# Patient Record
Sex: FEMALE | Race: WHITE | NOT HISPANIC OR LATINO | Employment: UNEMPLOYED | URBAN - METROPOLITAN AREA
[De-identification: names, ages, dates, MRNs, and addresses within clinical notes are randomized per-mention and may not be internally consistent; named-entity substitution may affect disease eponyms.]

---

## 2017-03-20 ENCOUNTER — PATIENT MESSAGE (OUTPATIENT)
Dept: PODIATRY | Facility: CLINIC | Age: 47
End: 2017-03-20

## 2017-03-20 DIAGNOSIS — M10.9 ACUTE GOUT OF FOOT, UNSPECIFIED CAUSE, UNSPECIFIED LATERALITY: Primary | ICD-10-CM

## 2017-04-13 ENCOUNTER — OFFICE VISIT (OUTPATIENT)
Dept: OPTOMETRY | Facility: CLINIC | Age: 47
End: 2017-04-13
Payer: COMMERCIAL

## 2017-04-13 DIAGNOSIS — Z46.0 ENCOUNTER FOR FITTING OR ADJUSTMENT OF SPECTACLES OR CONTACT LENSES: Primary | ICD-10-CM

## 2017-04-13 DIAGNOSIS — H35.413 LATTICE DEGENERATION, BOTH EYES: Primary | ICD-10-CM

## 2017-04-13 DIAGNOSIS — H52.13 HIGH MYOPIA, BOTH EYES: ICD-10-CM

## 2017-04-13 DIAGNOSIS — H52.223 REGULAR ASTIGMATISM OF BOTH EYES: ICD-10-CM

## 2017-04-13 DIAGNOSIS — H52.4 PRESBYOPIA OU: ICD-10-CM

## 2017-04-13 DIAGNOSIS — Z13.5 SCREENING FOR OTHER EYE CONDITIONS: ICD-10-CM

## 2017-04-13 PROCEDURE — 92015 DETERMINE REFRACTIVE STATE: CPT | Mod: S$GLB,,, | Performed by: OPTOMETRIST

## 2017-04-13 PROCEDURE — 92310 CONTACT LENS FITTING OU: CPT | Mod: ,,, | Performed by: OPTOMETRIST

## 2017-04-13 PROCEDURE — 99999 PR PBB SHADOW E&M-EST. PATIENT-LVL III: CPT | Mod: PBBFAC,,, | Performed by: OPTOMETRIST

## 2017-04-13 PROCEDURE — 99499 UNLISTED E&M SERVICE: CPT | Mod: S$GLB,,, | Performed by: OPTOMETRIST

## 2017-04-13 PROCEDURE — 92014 COMPRE OPH EXAM EST PT 1/>: CPT | Mod: S$GLB,,, | Performed by: OPTOMETRIST

## 2017-04-13 NOTE — PATIENT INSTRUCTIONS
Bilateral dry eye.  Continue Restasis ophthalmic emulsion: Instill one drop two times per day, as directed (not with CLs in).  Use ocular lubricting drops, or CL rewetting drops with CLs in, as often as needed throughout the day.  High myopia with astigmatism in each eye.  New spectacle lens Rx issued for use in lieu of CLs.  Wears SCLs. Good lens fit in each eye with Biofinity Toric SCLs.  Shows need for power change in the left lens per over-refraction done today.  No need for power change in the right lens.  New contact lens Rx issued.  Suggest use of +1.50 or +1.75 OTC reading glasses over CLs as needed.  Follow up with Dr. Steward in retina clinic as scheduled.  Repeat general eye examination and refraction, and contact lens follow up, in one year.

## 2017-04-13 NOTE — PROGRESS NOTES
HPI     Contact Lens Follow Up    Additional comments: contact lens follow up with general eye examination.              Comments   Patient in today for contact lens follow-up with general eye examination.    Refer to additional patient encounter notes dated 04/13/2017. .         Last edited by Slim Paul, OD on 4/13/2017  4:03 PM. (History)            Assessment /Plan     For exam results, see Encounter Report.    1. Encounter for fitting or adjustment of spectacles or contact lenses                      Bilateral dry eye.  Continue Restasis ophthalmic emulsion: Instill one drop two times per day, as directed (not with CLs in).  Use ocular lubricting drops, or CL rewetting drops with CLs in, as often as needed throughout the day.  High myopia with astigmatism in each eye.  New spectacle lens Rx issued for use in lieu of CLs.  Wears SCLs. Good lens fit in each eye with Biofinity Toric SCLs.  Shows need for power change in the left lens per over-refraction done today.  No need for power change in the right lens.  New contact lens Rx issued.  Suggest use of +1.50 or +1.75 OTC reading glasses over CLs as needed.  Follow up with Dr. Steward in retina clinic as scheduled.  Repeat general eye examination and refraction, and contact lens follow up, in one year.

## 2017-04-13 NOTE — PROGRESS NOTES
"HPI     Concerns About Ocular Health    Additional comments: Eye exam and refraction, and contact lens follow-up.    Notes difficulty with near vision/reading.  Reports that she perceives   that "eyes are wiggling", and she has problems pulling words together.    Has used reading glasses over CLs which have not seemed to help the   problem.  No such symptoms at distance.            Comments   Patient's age: 47 y.o. 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 Paul)  Wears glasses? Yes, wears when without SCLs.     If yes, wears  Full-time or part-time?  As noted above  Present glasses are: Bifocal, SV Distance, SV Reading?  Single vision   distance lenses  Approximate age of present glasses:  18 months   Got new glasses following last refraction, or subsequently?:  yes   Any problem with VA with glasses?  No  Wears CLs?:  yes           If yes:              Type of CL worn:  New trial Biofinity Toric SCLs:                                                            OD  8.7   14.5     -7.50 -1.25 x 040                                                            OS  8.7   14.5      -9.00 -1.25 x 140              Wears full-time or part-time:  Full-time daily wear               Sleeps with contact lenses:  no               CL Solution used:  Biotrue, and Hchaya               How often replace CLs:  Monthly, typically              Any problem with VA with CLs?  no                Headaches?  No   Eye pain/discomfort?  Persistent dry eye symptoms                                                                                   Flashes?  No  Floaters?  "significant floaters" - "no new ones"  Diplopia/Double vision?  no  Patient's Ocular History:         Any eye surgeries? Laser procedures OS (for " "retina issues - lattice   degeneration)         Any eye injury?  no         Any treatment for eye disease?  As noted above  Family history of eye disease?  Father:  Cataract, and detached retina   following surgery - mother: ARMD (dry in one eye, and wet in the other).   Significant patient medical history:         1. Diabetes?  no       If yes, IDDM or NIDDM? n/a   2. HBP?  Yes.  Takes meds.  Well-controlled per patient              3. Other (describe):  none   ! OTC eyedrops currently using:  Blinx, Thera Tears, and Systane Ultra at   bedtime   ! Prescription eye meds currently using:  No - never filled Rx for   Restasis 2/2 cost concerns   ! Any history of allergy/adverse reaction to any eye meds used   previously?  no    ! Any history of allergy/adverse reaction to eyedrops used during prior   eye exam(s)? no   ! Any history of allergy/adverse reaction to Epinephrine or similar meds?   no    ! Patient okay with use of anesthetic eyedrops to check eye pressure?    yes        ! Patient okay with use of eyedrops to dilate pupils today?  yes   !  Allergies/Medications/Medical History/Family History reviewed today?    yes      PD =   68/64  Desired reading distance =  16.25"                                                                      Last edited by Slim Paul, OD on 4/13/2017  4:31 PM. (History)            Assessment /Plan     For exam results, see Encounter Report.    1. Lattice degeneration, both eyes     2. High myopia, both eyes     3. Regular astigmatism of both eyes     4. Presbyopia OU     5. Screening for other eye conditions                 Bilateral dry eye.  Continue Restasis ophthalmic emulsion: Instill one drop two times per day, as directed (not with CLs in).  Use ocular lubricting drops, or CL rewetting drops with CLs in, as often as needed throughout the day.  High myopia with astigmatism in each eye.  New spectacle lens Rx issued for use in lieu of CLs.  Wears SCLs. Good lens fit in " each eye with Biofinity Toric SCLs.  Shows need for power change in the left lens per over-refraction done today.  No need for power change in the right lens.  New contact lens Rx issued.  Suggest use of +1.50 or +1.75 OTC reading glasses over CLs as needed.  Follow up with Dr. Steward in retina clinic as scheduled.  Repeat general eye examination and refraction, and contact lens follow up, in one year.

## 2017-04-13 NOTE — MR AVS SNAPSHOT
[facility] - Optometry  [address]  [facility] [address]  Phone: [phone]  Fax: [phone]                  Lashae Colvin   2017 3:30 PM   Office Visit    Description:  Female : 1970   Provider:  Slim Paul OD   Department:  [facility]           Reason for Visit     Concerns About Ocular Health     Contact Lens Follow Up                To Do List           Goals (5 Years of Data)     None      [facility]     [facility] -  Assistance  Unless otherwise directed by your provider, please contact [facility], our nurse care line that is available for  assistance.     Registered nurses in the [facility] provide: appointment scheduling, clinical advisement, health education, and other advisory services.  Call: [phone] (toll free)               Medications           Message regarding Medications     Verify the changes and/or additions to your medication regime listed below are the same as discussed with your clinician today.  If any of these changes or additions are incorrect, please notify your healthcare provider.             Verify that the below list of medications is an accurate representation of the medications you are currently taking.  If none reported, the list may be blank. If incorrect, please contact your healthcare provider. Carry this list with you in case of emergency.           Current Medications     albuterol 90 mcg/actuation inhaler Inhale 2 puffs into the lungs every 6 (six) hours as needed for Wheezing.    CALCIUM-VITAMIN D3 ORAL Take 1 tablet by mouth once daily.    fluticasone (FLONASE) 50 mcg/actuation nasal spray     ibuprofen (ADVIL,MOTRIN) 800 MG tablet Take 800 mg by mouth 3 (three) times daily.    lisinopril 10 MG tablet Take 1 tablet (10 mg total) by mouth once daily.    RESTASIS 0.05 % ophthalmic emulsion     omega-3 fatty acids-vitamin E (FISH OIL) 1,000 mg Cap Take 2,000 mg by  mouth 2 (two) times daily.           Clinical Reference Information           Allergies as of 4/13/2017     No Known Allergies      Immunizations Administered on Date of Encounter - 4/13/2017     None      Instructions    Bilateral dry eye.  Continue Restasis ophthalmic emulsion: Instill one drop two times per day, as directed (not with CLs in).  Use ocular lubricting drops, or CL rewetting drops with CLs in, as often as needed throughout the day.  High myopia with astigmatism in each eye.  New spectacle lens Rx issued for use in lieu of CLs.  Wears SCLs. Good lens fit in each eye with Biofinity Toric SCLs.  Shows need for power change in the left lens per over-refraction done today.  No need for power change in the right lens.  New contact lens Rx issued.  Suggest use of +1.50 or +1.75 OTC reading glasses over CLs as needed.  Follow up with Dr. Steward in retina clinic as scheduled.  Repeat general eye examination and refraction, and contact lens follow up, in one year.           Language Assistance Services     ATTENTION: Language assistance services are available, free of charge. Please call [phone].      ATENCIÓN: Si whitney vinson, tiene a park disposición servicios gratuitos de asistencia lingüística. Llame al [phone].     REN Ý: N?u b?n nói Ti?ng Vi?t, có các d?ch v? h? tr? ngôn ng? mi?n phí dành cho b?n. G?i s? [phone].         [facility] complies with applicable Federal civil rights laws and does not discriminate on the basis of race, color, national origin, age, disability, or sex.

## 2017-04-13 NOTE — MR AVS SNAPSHOT
[facility] - Optometry  [address]  [facility] [address]  Phone: [phone]  Fax: [phone]                  Lashae Colvin   2017 4:00 PM   Office Visit    Description:  Female : 1970   Provider:  Slim Paul OD   Department:  [facility]           Reason for Visit     Contact Lens Follow Up                To Do List           Goals (5 Years of Data)     None      [facility]     [facility] On [facility] Nurse Care Line -  Assistance  Unless otherwise directed by your provider, please contact [facility], our nurse care line that is available for  assistance.     Registered nurses in the [facility] provide: appointment scheduling, clinical advisement, health education, and other advisory services.  Call: [phone] (toll free)               Medications           Message regarding Medications     Verify the changes and/or additions to your medication regime listed below are the same as discussed with your clinician today.  If any of these changes or additions are incorrect, please notify your healthcare provider.             Verify that the below list of medications is an accurate representation of the medications you are currently taking.  If none reported, the list may be blank. If incorrect, please contact your healthcare provider. Carry this list with you in case of emergency.           Current Medications     albuterol 90 mcg/actuation inhaler Inhale 2 puffs into the lungs every 6 (six) hours as needed for Wheezing.    CALCIUM-VITAMIN D3 ORAL Take 1 tablet by mouth once daily.    fluticasone (FLONASE) 50 mcg/actuation nasal spray     ibuprofen (ADVIL,MOTRIN) 800 MG tablet Take 800 mg by mouth 3 (three) times daily.    lisinopril 10 MG tablet Take 1 tablet (10 mg total) by mouth once daily.    RESTASIS 0.05 % ophthalmic emulsion     omega-3 fatty acids-vitamin E (FISH OIL) 1,000 mg Cap Take 2,000 mg by mouth 2 (two) times daily.            Clinical Reference Information           Allergies as of 4/13/2017     No Known Allergies      Immunizations Administered on Date of Encounter - 4/13/2017     None      Instructions    Bilateral dry eye.  Continue Restasis ophthalmic emulsion: Instill one drop two times per day, as directed (not with CLs in).  Use ocular lubricting drops, or CL rewetting drops with CLs in, as often as needed throughout the day.  High myopia with astigmatism in each eye.  New spectacle lens Rx issued for use in lieu of CLs.  Wears SCLs. Good lens fit in each eye with Biofinity Toric SCLs.  Shows need for power change in the left lens per over-refraction done today.  No need for power change in the right lens.  New contact lens Rx issued.  Suggest use of +1.50 or +1.75 OTC reading glasses over CLs as needed.  Follow up with Dr. Steward in retina clinic as scheduled.  Repeat general eye examination and refraction, and contact lens follow up, in one year.             Language Assistance Services     ATTENTION: Language assistance services are available, free of charge. Please call [phone].      ATENCIÓN: Si whitney vinson, tiene a park disposición servicios gratuitos de asistencia lingüística. Llame al [phone].     REN Ý: N?u b?n nói Ti?ng Vi?t, có các d?ch v? h? tr? ngôn ng? mi?n phí dành cho b?n. G?i s? [phone].         [facility] complies with applicable Federal civil rights laws and does not discriminate on the basis of race, color, national origin, age, disability, or sex.

## 2017-07-14 ENCOUNTER — OFFICE VISIT (OUTPATIENT)
Dept: INTERNAL MEDICINE | Facility: CLINIC | Age: 47
End: 2017-07-14
Attending: INTERNAL MEDICINE
Payer: COMMERCIAL

## 2017-07-14 VITALS
SYSTOLIC BLOOD PRESSURE: 110 MMHG | HEIGHT: 66 IN | BODY MASS INDEX: 26.82 KG/M2 | OXYGEN SATURATION: 99 % | DIASTOLIC BLOOD PRESSURE: 78 MMHG | WEIGHT: 166.88 LBS | HEART RATE: 71 BPM

## 2017-07-14 DIAGNOSIS — G47.9 SLEEP DISORDER: ICD-10-CM

## 2017-07-14 DIAGNOSIS — M10.9 GOUT, UNSPECIFIED CAUSE, UNSPECIFIED CHRONICITY, UNSPECIFIED SITE: ICD-10-CM

## 2017-07-14 DIAGNOSIS — I10 HYPERTENSION, BENIGN: ICD-10-CM

## 2017-07-14 DIAGNOSIS — Z23 NEED FOR HEPATITIS A AND B VACCINATION: ICD-10-CM

## 2017-07-14 DIAGNOSIS — I10 ESSENTIAL HYPERTENSION: ICD-10-CM

## 2017-07-14 DIAGNOSIS — Z00.00 ANNUAL PHYSICAL EXAM: Primary | ICD-10-CM

## 2017-07-14 DIAGNOSIS — Z12.39 SCREENING FOR BREAST CANCER: ICD-10-CM

## 2017-07-14 PROCEDURE — 99396 PREV VISIT EST AGE 40-64: CPT | Mod: S$GLB,,, | Performed by: INTERNAL MEDICINE

## 2017-07-14 PROCEDURE — 99999 PR PBB SHADOW E&M-EST. PATIENT-LVL III: CPT | Mod: PBBFAC,,, | Performed by: INTERNAL MEDICINE

## 2017-07-14 RX ORDER — PREDNISONE 10 MG/1
TABLET ORAL
Qty: 11 TABLET | Refills: 0 | Status: SHIPPED | OUTPATIENT
Start: 2017-07-14 | End: 2018-08-09

## 2017-07-14 RX ORDER — COLCHICINE 0.6 MG/1
TABLET ORAL
COMMUNITY
Start: 2017-06-28 | End: 2017-07-14 | Stop reason: ALTCHOICE

## 2017-07-14 RX ORDER — INDOMETHACIN 50 MG/1
50 CAPSULE ORAL
Qty: 14 CAPSULE | Refills: 1 | Status: SHIPPED | OUTPATIENT
Start: 2017-07-14 | End: 2017-07-21

## 2017-07-14 RX ORDER — PRAZOSIN HYDROCHLORIDE 2 MG/1
CAPSULE ORAL
Refills: 0 | COMMUNITY
Start: 2017-07-12 | End: 2017-11-27 | Stop reason: SDUPTHER

## 2017-07-14 RX ORDER — INDOMETHACIN 50 MG/1
CAPSULE ORAL
COMMUNITY
Start: 2017-06-28 | End: 2017-07-14 | Stop reason: SDUPTHER

## 2017-07-14 RX ORDER — LISINOPRIL 10 MG/1
10 TABLET ORAL DAILY
Qty: 90 TABLET | Refills: 3 | Status: SHIPPED | OUTPATIENT
Start: 2017-07-14 | End: 2018-08-03 | Stop reason: SDUPTHER

## 2017-07-14 RX ORDER — ALLOPURINOL 100 MG/1
100 TABLET ORAL DAILY
Qty: 90 TABLET | Refills: 0 | Status: SHIPPED | OUTPATIENT
Start: 2017-07-14 | End: 2018-02-22 | Stop reason: DRUGHIGH

## 2017-07-14 NOTE — PROGRESS NOTES
Subjective:       Patient ID: Lashae Colvin is a 47 y.o. female.    Chief Complaint: Annual Exam and Foot Pain    HPI     her for annual exam  Had recent cmp 6/29/17 CMP nonfasting - glu 112  Cr 0.6, Na 131, CO2 26, tbili 0.8, Ast: 19, ALT 22, TP 6.8, Alb 3.8 K 3.8, ALP 53, BUN 10  Uric acid elevated at 10.3     Was treated a couple of weeks ago for gout of left foot at first MTP joint- given colchicine 1.2 x 1 and 0.6 x 1 and then started indomethacin the next day. Colchicine did not help and caused loose stools  Took indomethacin and last dose 6 days ago and symptoms improved. Then noticed right foot began to start gout flare with erythema, inc warmth and tenderness at right first MTP joint.   No fevers or chills.   Pt has been following dietary restrictions to prevent gout flares. Avoiding all etoh and avoiding meat to help with gout   Taking zantac otc when taking nsaids for gout flares.     Has rheum appt at outside facility scheduled - she is ready to start preventative medicine since unable to prevent flares with dietary restrictions - Has never tried allopurinol     Followed by sleep for sleep disorder and jenn prazosin     Review of Systems   Constitutional: Negative for chills and fever.   Respiratory: Negative for cough and shortness of breath.    Cardiovascular: Negative for chest pain and palpitations.   Musculoskeletal: Positive for arthralgias and joint swelling.       Objective:      Physical Exam   Constitutional: She is oriented to person, place, and time. She appears well-developed and well-nourished.   HENT:   Head: Normocephalic and atraumatic.   Right Ear: External ear normal.   Left Ear: External ear normal.   Nose: Nose normal.   Mouth/Throat: Oropharynx is clear and moist. No oropharyngeal exudate.   No carotid bruits   Eyes: Conjunctivae and EOM are normal.   Neck: Neck supple. No thyromegaly present.   Cardiovascular: Normal rate, regular rhythm and intact distal pulses.     Pulmonary/Chest: Effort normal and breath sounds normal.   Abdominal: Soft. Bowel sounds are normal.   Musculoskeletal: She exhibits edema and tenderness.   right first MTP with redness and ttp and mild edema  Left first MTP with mild ttp and edema  No calf ttp, edema, inc warmth or redness   Lymphadenopathy:     She has no cervical adenopathy.   Neurological: She is alert and oriented to person, place, and time. Coordination normal.   Skin: Skin is warm and dry.   Psychiatric: She has a normal mood and affect. Her behavior is normal. Judgment and thought content normal.       Assessment:       Lashae was seen today for annual exam and foot pain.    Diagnoses and all orders for this visit:    Annual physical exam  -     Comprehensive metabolic panel; Future  -     CBC auto differential; Future  -     TSH; Future  -     Lipid panel; Future  Recommend daily sunscreen, cardiovascular exercise min 30 min 5 days per week. Seatbelts routinely.    Gout, unspecified cause, unspecified chronicity, unspecified site: give pdn taper, pt to resume zantac otc with indomethacin x 7 days. Start allopurinol in 1-2 weeks at 100mg - repeat level in 4-6 weeks - cont dietary restrictions  -     Uric acid; Future    Essential hypertension: controlled, cont meds    Sleep disorder: stable, followed by sleep - cont med    Hypertension, benign:as above  -     lisinopril 10 MG tablet; Take 1 tablet (10 mg total) by mouth once daily.    Screening for breast cancer  -     Mammo Digital Screening Bilateral With CAD; Future    Need for hepatitis A and B vaccination:   -     (In Office Administered) Hepatitis A / Hepatitis B Combined Vaccine (IM); Future    Other orders  -     allopurinol (ZYLOPRIM) 100 MG tablet; Take 1 tablet (100 mg total) by mouth once daily.  -     predniSONE (DELTASONE) 10 MG tablet; Take 4 pills daily x 1 day, then 3 pills daily x1 day, then 2 pill daily x 1 days, then 1 pill daily x 1 day then 1/2 pill daily for 2  days.  -     indomethacin (INDOCIN) 50 MG capsule; Take 1 capsule (50 mg total) by mouth 3 (three) times daily with meals.  -     ranitidine (ZANTAC) 75 MG tablet; Take 1 tablet (75 mg total) by mouth 2 (two) times daily.

## 2017-08-14 ENCOUNTER — PATIENT MESSAGE (OUTPATIENT)
Dept: INTERNAL MEDICINE | Facility: CLINIC | Age: 47
End: 2017-08-14

## 2017-11-28 RX ORDER — PRAZOSIN HYDROCHLORIDE 2 MG/1
CAPSULE ORAL
Qty: 60 CAPSULE | Refills: 11 | Status: SHIPPED | OUTPATIENT
Start: 2017-11-28 | End: 2018-12-31 | Stop reason: SDUPTHER

## 2018-02-22 ENCOUNTER — PATIENT OUTREACH (OUTPATIENT)
Dept: INTERNAL MEDICINE | Facility: CLINIC | Age: 48
End: 2018-02-22

## 2018-02-22 NOTE — PROGRESS NOTES
[facility] is committed to your overall health.  To help you get the most out of each of your visits, we will review your information to make sure you are up to date on all of your recommended tests and/or procedures.       Your PCP  Kristen Abreu MD   found that you may be due for:       Health Maintenance Due   Topic Date Due    Influenza Vaccine  08/01/2017             If you have had any of the above done at another facility, please bring the records or information with you so that your record at [facility] will be complete.  If you would like to schedule any of these, please contact me.     If you are currently taking medication, please bring it with you to your appointment for review.     Also, if you have any type of Advanced Directives, please bring them with you to your office visit so we may scan them into your chart.       Thank you for Choosing [facility] for your healthcare needs.        Additional Information  If you have questions, you can email [email or web address] or call [phone]  to talk to our [facility] staff. Remember, [facility] is NOT to be used for urgent needs. For medical emergencies, dial 911.

## 2018-02-23 ENCOUNTER — OFFICE VISIT (OUTPATIENT)
Dept: INTERNAL MEDICINE | Facility: CLINIC | Age: 48
End: 2018-02-23
Attending: INTERNAL MEDICINE
Payer: COMMERCIAL

## 2018-02-23 VITALS
HEIGHT: 66 IN | DIASTOLIC BLOOD PRESSURE: 82 MMHG | WEIGHT: 168.63 LBS | BODY MASS INDEX: 27.1 KG/M2 | HEART RATE: 76 BPM | SYSTOLIC BLOOD PRESSURE: 130 MMHG | OXYGEN SATURATION: 100 %

## 2018-02-23 DIAGNOSIS — M25.552 BILATERAL HIP PAIN: Primary | ICD-10-CM

## 2018-02-23 DIAGNOSIS — R22.42 MASS OF LEFT THIGH: ICD-10-CM

## 2018-02-23 DIAGNOSIS — R22.42 HIP MASS, LEFT: ICD-10-CM

## 2018-02-23 DIAGNOSIS — M25.551 BILATERAL HIP PAIN: Primary | ICD-10-CM

## 2018-02-23 PROCEDURE — 99999 PR PBB SHADOW E&M-EST. PATIENT-LVL III: CPT | Mod: PBBFAC,,, | Performed by: INTERNAL MEDICINE

## 2018-02-23 PROCEDURE — 99214 OFFICE O/P EST MOD 30 MIN: CPT | Mod: S$GLB,,, | Performed by: INTERNAL MEDICINE

## 2018-02-23 PROCEDURE — 3008F BODY MASS INDEX DOCD: CPT | Mod: S$GLB,,, | Performed by: INTERNAL MEDICINE

## 2018-02-23 RX ORDER — ALLOPURINOL 300 MG/1
300 TABLET ORAL DAILY
COMMUNITY
Start: 2017-08-01 | End: 2018-05-01 | Stop reason: SDUPTHER

## 2018-02-23 RX ORDER — ALPRAZOLAM 0.5 MG/1
TABLET ORAL
Qty: 2 TABLET | Refills: 0 | Status: SHIPPED | OUTPATIENT
Start: 2018-02-23 | End: 2018-08-09

## 2018-02-23 NOTE — PROGRESS NOTES
Subjective:   Patient ID: Lashae Colvin is a 47 y.o. female  Chief complaint:   Chief Complaint   Patient presents with    Hip Pain     swelling  and pain radiating side and back of legs       HPI  Pt here for 8-9 months of bilateral hip pian left > right   Joint Pain - stabbing in nature, will radiate down lateral aspect and post portion of thighs.   Constant but intensity will flare   Worse with bending, getting out of car, lying on side  Improves with nothing. Ibuprofen not helpful  No trauma or inciting event  Also noticed at this time a mass at left lateral hip/thigh - tender to touch or with lying on side, no skin changes or redness. No inc warmth    Has chronic mild intermittent lower back pain unchanged in nature. Achy located at bilateral paraspinal mm. Assoc with + sciatica along post legs and lateral legs described as burning.   Has been Exercising and stretching which are helpful at times.   Walking more since starting allopurinol and gout controlled    No numbness or tingling, weakness, saddle paraesthesias, gait disturbance, incontinence  No dysuria or vaginal discharge, no abd pain, No fevers or chills   No unexplained wt loss  No unexplained bleeding or bruising.     Review of Systems   Constitutional: Negative for activity change and unexpected weight change.   HENT: Negative for hearing loss, rhinorrhea and trouble swallowing.    Eyes: Negative for discharge and visual disturbance.   Respiratory: Negative for chest tightness and wheezing.    Cardiovascular: Negative for chest pain and palpitations.   Gastrointestinal: Negative for blood in stool, constipation, diarrhea and vomiting.   Endocrine: Negative for polydipsia and polyuria.   Genitourinary: Negative for difficulty urinating, dysuria, hematuria and menstrual problem.   Musculoskeletal: Positive for arthralgias, joint swelling and neck pain.   Skin: Negative for color change and rash.   Neurological: Negative for weakness and  "headaches.   Psychiatric/Behavioral: Negative for confusion and dysphoric mood.     Objective:  Vitals:    02/23/18 1232   BP: 130/82   BP Location: Left arm   Patient Position: Sitting   BP Method: Medium (Manual)   Pulse: 76   SpO2: 100%   Weight: 76.5 kg (168 lb 10.4 oz)   Height: 5' 6" (1.676 m)     Body mass index is 27.22 kg/m².    Physical Exam   Constitutional: She is oriented to person, place, and time. She appears well-developed and well-nourished.   HENT:   Head: Normocephalic and atraumatic.   Eyes: Conjunctivae and EOM are normal.   Neck: Normal range of motion. Neck supple.   Cardiovascular: Normal rate, regular rhythm and intact distal pulses.    Pulmonary/Chest: Effort normal and breath sounds normal.   Abdominal: Soft. Normal appearance and bowel sounds are normal.   Musculoskeletal: Normal range of motion. She exhibits tenderness. She exhibits no edema.   6"x6" soft tissue mass at left lateral thigh at greater trochanter that is tender to palpation  No skin changes, inc warmth, laceration, ulcers or skin breakdown  FROM of internal and external rotation of hip  5/5 strength of B LE  No spinal ttp  + ttp of lumbar paraspinal mm  Sensation in tact bilaterally   Neurological: She is alert and oriented to person, place, and time. She has normal strength. Gait normal.   Skin: Skin is warm, dry and intact. Capillary refill takes less than 2 seconds. No cyanosis. Nails show no clubbing.   Psychiatric: She has a normal mood and affect. Her speech is normal and behavior is normal. Cognition and memory are normal.   Vitals reviewed.    Assessment:  1. Bilateral hip pain    2. Mass of left thigh    3. Hip mass, left        Plan:  Lashae was seen today for hip pain.    Diagnoses and all orders for this visit:    Bilateral hip pain  -     MRI Pelvis W WO Contrast; Future    Mass of left thigh  -     MRI Pelvis W WO Contrast; Future    Hip mass, left  -     MRI Pelvis W WO Contrast; Future    Other orders  -   "   ALPRAZolam (XANAX) 0.5 MG tablet; Take 1 tab 30 min prior to MRI - ok to take 2nd dose if 1st dose not effective after 30 minutes    Sx and mass x 8-9 months - check MRI and will give further recs pending those results.   For now can try aleve otc bid x 7-14 days scheduled with otc zantac   Xanax given prior to MRI for hx of claustrophobia   Er and rtc prompts given.   All questions were answered and pt verbalized understanding of plan.     Health Maintenance   Topic Date Due    Influenza Vaccine  08/01/2017    Mammogram  07/06/2018 (Originally 7/15/2016)    Pap Smear with HPV Cotest  08/01/2019    TETANUS VACCINE  01/01/2020    Lipid Panel  01/20/2021

## 2018-03-01 ENCOUNTER — HOSPITAL ENCOUNTER (OUTPATIENT)
Dept: RADIOLOGY | Facility: OTHER | Age: 48
Discharge: HOME | End: 2018-03-01
Attending: INTERNAL MEDICINE
Payer: COMMERCIAL

## 2018-03-01 DIAGNOSIS — R22.42 MASS OF LEFT THIGH: ICD-10-CM

## 2018-03-01 DIAGNOSIS — R22.42 HIP MASS, LEFT: ICD-10-CM

## 2018-03-01 DIAGNOSIS — M25.552 BILATERAL HIP PAIN: ICD-10-CM

## 2018-03-01 DIAGNOSIS — M25.551 BILATERAL HIP PAIN: ICD-10-CM

## 2018-03-01 PROCEDURE — A9585 GADOBUTROL INJECTION: HCPCS | Performed by: INTERNAL MEDICINE

## 2018-03-01 PROCEDURE — 72197 MRI PELVIS W/O & W/DYE: CPT | Mod: 26,,, | Performed by: RADIOLOGY

## 2018-03-01 PROCEDURE — 25500020 PHARM REV CODE 255: Performed by: INTERNAL MEDICINE

## 2018-03-01 PROCEDURE — 72197 MRI PELVIS W/O & W/DYE: CPT | Mod: TC

## 2018-03-01 RX ORDER — GADOBUTROL 604.72 MG/ML
7.5 INJECTION INTRAVENOUS
Status: COMPLETED | OUTPATIENT
Start: 2018-03-01 | End: 2018-03-01

## 2018-03-01 RX ADMIN — GADOBUTROL 7.5 ML: 604.72 INJECTION INTRAVENOUS at 04:03

## 2018-03-05 ENCOUNTER — TELEPHONE (OUTPATIENT)
Dept: INTERNAL MEDICINE | Facility: CLINIC | Age: 48
End: 2018-03-05

## 2018-03-05 DIAGNOSIS — M25.551 PAIN OF BOTH HIP JOINTS: ICD-10-CM

## 2018-03-05 DIAGNOSIS — M76.891 ENTHESOPATHY OF HIP REGION ON BOTH SIDES: Primary | ICD-10-CM

## 2018-03-05 DIAGNOSIS — M76.892 ENTHESOPATHY OF HIP REGION ON BOTH SIDES: Primary | ICD-10-CM

## 2018-03-05 DIAGNOSIS — M25.552 PAIN OF BOTH HIP JOINTS: ICD-10-CM

## 2018-03-05 RX ORDER — NAPROXEN 500 MG/1
500 TABLET ORAL 2 TIMES DAILY WITH MEALS
Qty: 28 TABLET | Refills: 0 | Status: SHIPPED | OUTPATIENT
Start: 2018-03-05 | End: 2018-06-06 | Stop reason: SDUPTHER

## 2018-03-05 NOTE — TELEPHONE ENCOUNTER
Message sent to pt via my chart with lab results and updates to plan.     Please schedule ortho appt  Please inform her that PT should be contacting her to schedule with them

## 2018-03-06 ENCOUNTER — TELEPHONE (OUTPATIENT)
Dept: INTERNAL MEDICINE | Facility: CLINIC | Age: 48
End: 2018-03-06

## 2018-03-06 NOTE — TELEPHONE ENCOUNTER
----- Message from Stella Esquivel sent at 3/6/2018  3:21 PM CST -----  Contact: pt  _  1st Request  _  2nd Request  _  3rd Request        Who: pt    Why: Requesting a call back in regards to returned the nurse's phone call. Please call pt  Please return the call at earliest convenience. Thanks!    What Number to Call Back:[phone]      When to Expect a call back: (Within 24 hours)

## 2018-03-29 DIAGNOSIS — M25.552 PAIN OF BOTH HIP JOINTS: Primary | ICD-10-CM

## 2018-03-29 DIAGNOSIS — M25.551 PAIN OF BOTH HIP JOINTS: Primary | ICD-10-CM

## 2018-04-02 ENCOUNTER — HOSPITAL ENCOUNTER (OUTPATIENT)
Dept: RADIOLOGY | Facility: HOSPITAL | Age: 48
Discharge: HOME | End: 2018-04-02
Attending: ORTHOPAEDIC SURGERY
Payer: COMMERCIAL

## 2018-04-02 ENCOUNTER — OFFICE VISIT (OUTPATIENT)
Dept: ORTHOPEDICS | Facility: CLINIC | Age: 48
End: 2018-04-02
Payer: COMMERCIAL

## 2018-04-02 VITALS — WEIGHT: 168.88 LBS | BODY MASS INDEX: 27.14 KG/M2 | HEIGHT: 66 IN

## 2018-04-02 DIAGNOSIS — M25.551 PAIN OF BOTH HIP JOINTS: ICD-10-CM

## 2018-04-02 DIAGNOSIS — M70.62 TROCHANTERIC BURSITIS, LEFT HIP: Primary | ICD-10-CM

## 2018-04-02 DIAGNOSIS — M25.552 PAIN OF BOTH HIP JOINTS: ICD-10-CM

## 2018-04-02 DIAGNOSIS — M70.61 TROCHANTERIC BURSITIS, RIGHT HIP: ICD-10-CM

## 2018-04-02 PROCEDURE — 73521 X-RAY EXAM HIPS BI 2 VIEWS: CPT | Mod: TC

## 2018-04-02 PROCEDURE — 73521 X-RAY EXAM HIPS BI 2 VIEWS: CPT | Mod: 26,,, | Performed by: RADIOLOGY

## 2018-04-02 PROCEDURE — 20610 DRAIN/INJ JOINT/BURSA W/O US: CPT | Mod: LT,S$GLB,, | Performed by: ORTHOPAEDIC SURGERY

## 2018-04-02 PROCEDURE — 99999 PR PBB SHADOW E&M-EST. PATIENT-LVL II: CPT | Mod: PBBFAC,,, | Performed by: ORTHOPAEDIC SURGERY

## 2018-04-02 PROCEDURE — 99203 OFFICE O/P NEW LOW 30 MIN: CPT | Mod: 25,S$GLB,, | Performed by: ORTHOPAEDIC SURGERY

## 2018-04-02 RX ORDER — TRIAMCINOLONE ACETONIDE 40 MG/ML
40 INJECTION, SUSPENSION INTRA-ARTICULAR; INTRAMUSCULAR
Status: COMPLETED | OUTPATIENT
Start: 2018-04-02 | End: 2018-04-02

## 2018-04-02 RX ADMIN — TRIAMCINOLONE ACETONIDE 40 MG: 40 INJECTION, SUSPENSION INTRA-ARTICULAR; INTRAMUSCULAR at 02:04

## 2018-04-02 NOTE — PROGRESS NOTES
Subjective:      Patient ID: Lashae Colvin is a 48 y.o. female.    Chief Complaint: Pain of the Left Hip and Pain of the Right Hip    HPI Lashae Colvin is a 48 year old female here with a 8 month history of bilateral hip pain (L > R). The patient is a . There was not a history of trauma.  The pain is moderate. The pain is located in the lateral.  There is is not radiation.  The pain is described as dull. The patient has not had prior surgery. It is aggravated by walking and with moderate activity.  It is alleviated by rest. There is occasional numbness or tingling of the lower extremity.  There is back pain.  She  has tried medications (Naproxen) but not injections. They have helped.  She does have difficulty getting in or out of a car, getting dressed, or going up or down stairs.  The patient does not use an assistive device.    Past Medical History:   Diagnosis Date    Arthritis     cervical spine DJD    Cancer     CIS of cervis s/p conization    Headache(784.0)     Hyperlipidemia     Hypertension     PTSD (post-traumatic stress disorder)        Past Surgical History:   Procedure Laterality Date    conization      for cervical CA in 2002    HAND SURGERY      R hand, repair of a severed tendon    TONSILLECTOMY         Family History   Problem Relation Age of Onset    Cancer Mother      spinal cord tumor    Hypertension Mother     Arthritis Mother     Diabetes Brother     HIV Brother     Macular degeneration Other     Hypertension Father     Cataracts Father     Diabetes Maternal Grandfather     Heart disease Paternal Grandfather     Heart attack Paternal Grandfather     Gout Paternal Grandfather     Amblyopia Neg Hx     Blindness Neg Hx     Glaucoma Neg Hx     Retinal detachment Neg Hx     Strabismus Neg Hx     Stroke Neg Hx     Thyroid disease Neg Hx     Ovarian cancer Neg Hx        Social History     Social History    Marital status: Significant Other      Spouse name: N/A    Number of children: N/A    Years of education: N/A     Occupational History     [employer]     Social History Main Topics    Smoking status: Former Smoker     Quit date: 4/11/2001    Smokeless tobacco: Never Used    Alcohol use 3.0 oz/week     5 Glasses of wine per week    Drug use: No    Sexual activity: Yes     Partners: Male     Birth control/ protection: Sponge, Spermicide     Other Topics Concern    None     Social History Narrative    Lives w/boyfriend    Originally from [place]    Moved to [facility] in 1998       Current Outpatient Prescriptions   Medication Sig Dispense Refill    allopurinol (ZYLOPRIM) 300 MG tablet Take 300 mg by mouth once daily.      CALCIUM-VITAMIN D3 ORAL Take 1 tablet by mouth once daily.      fluticasone (FLONASE) 50 mcg/actuation nasal spray   0    lisinopril 10 MG tablet Take 1 tablet (10 mg total) by mouth once daily. 90 tablet 3    prazosin (MINIPRESS) 2 MG Cap take 2 capsules by mouth every evening 60 capsule 11    albuterol 90 mcg/actuation inhaler Inhale 2 puffs into the lungs every 6 (six) hours as needed for Wheezing. 18 g 11    ALPRAZolam (XANAX) 0.5 MG tablet Take 1 tab 30 min prior to MRI - ok to take 2nd dose if 1st dose not effective after 30 minutes 2 tablet 0    naproxen (NAPROSYN) 500 MG tablet Take 1 tablet (500 mg total) by mouth 2 (two) times daily with meals. 28 tablet 0    omega-3 fatty acids-vitamin E (FISH OIL) 1,000 mg Cap Take 2,000 mg by mouth 2 (two) times daily.  0    predniSONE (DELTASONE) 10 MG tablet Take 4 pills daily x 1 day, then 3 pills daily x1 day, then 2 pill daily x 1 days, then 1 pill daily x 1 day then 1/2 pill daily for 2 days. 11 tablet 0    ranitidine (ZANTAC) 75 MG tablet Take 1 tablet (75 mg total) by mouth 2 (two) times daily. 60 tablet 0    RESTASIS 0.05 % ophthalmic emulsion        No current facility-administered medications for this visit.        Review of patient's  "allergies indicates:  No Known Allergies      Review of Systems   Constitution: Negative for chills, fever and night sweats.   HENT: Negative for hearing loss.    Eyes: Negative for blurred vision and double vision.   Cardiovascular: Negative for chest pain, claudication and leg swelling.   Respiratory: Negative for shortness of breath.    Endocrine: Negative for polydipsia, polyphagia and polyuria.   Hematologic/Lymphatic: Negative for adenopathy and bleeding problem. Does not bruise/bleed easily.   Skin: Negative for poor wound healing and rash.   Musculoskeletal: Positive for joint pain. Negative for joint swelling.   Gastrointestinal: Negative for abdominal pain, diarrhea, heartburn, nausea and vomiting.   Genitourinary: Negative for bladder incontinence.   Neurological: Negative for focal weakness, headaches, numbness, paresthesias and sensory change.   Psychiatric/Behavioral: The patient is not nervous/anxious.    Allergic/Immunologic: Negative for persistent infections.   All other systems reviewed and are negative.        Objective:      Estimated body mass index is 27.26 kg/m² as calculated from the following:    Height as of this encounter: 5' 6" (1.676 m).    Weight as of this encounter: 76.6 kg (168 lb 14 oz).        General    Constitutional: She is oriented to person, place, and time. She appears well-developed and well-nourished. No distress.   HENT:   Head: Normocephalic and atraumatic.   Eyes: EOM are normal.   Cardiovascular: Normal rate and intact distal pulses.    Pulmonary/Chest: Effort normal.   Neurological: She is alert and oriented to person, place, and time.   Psychiatric: She has a normal mood and affect. Her behavior is normal.     General Musculoskeletal Exam   Gait: normal   Pelvic Obliquity: none      Right Knee Exam     Inspection   Alignment:  normal  Effusion: effusion    Left Knee Exam     Inspection   Alignment:  normal  Effusion: absent    Right Hip Exam     Inspection   Scars: " absent  Swelling: absent  Bruising: absent  No deformity of hip.  Quadriceps Atrophy:  Negative  Erythema: absent    Tenderness   The patient tender to palpation of the trochanteric bursa.    Range of Motion   Extension: 0   Flexion: 120   Internal Rotation: 25   External Rotation: 50   Abduction: 35   Adduction: 25     Muscle Strength   The patient has normal right hip strength.    Tests   Pain w/ forced internal rotation (ROOPA): absent  Pain w/ forced external rotation (FADIR): absent  Stinchfield test: negative  Log Roll: negative    Other   Sensation: normal  Left Hip Exam     Inspection   Scars: absent  Swelling: absent  No deformity of hip.  Quadriceps Atrophy:  negative  Erythema: absent  Bruising: absent    Tenderness   The patient tender to palpation of the trochanteric bursa.    Range of Motion   Extension: 0   Flexion: 120   Internal Rotation: 25   External Rotation: 50   Abduction: 35   Adduction: 25     Muscle Strength   The patient has normal left hip strength.     Tests   Pain w/ forced internal rotation (ROOPA): present  Pain w/ forced external rotation (FADIR): absent  Stinchfield test: negative  Log Roll: negative    Other   Sensation: normal      Back (L-Spine & T-Spine) / Neck (C-Spine) Exam   Back exam is normal.      Muscle Strength   Right Lower Extremity   Hip Abduction: 5/5   Hip Adduction: 5/5   Hip Flexion: 5/5   Ankle Dorsiflexion:  5/5   Left Lower Extremity   Hip Abduction: 5/5   Hip Adduction: 5/5   Hip Flexion: 5/5   Ankle Dorsiflexion:  5/5     Reflexes     Left Side  Quadriceps:  2+    Right Side   Quadriceps:  2+    Vascular Exam     Right Pulses  Dorsalis Pedis:      2+          Left Pulses  Dorsalis Pedis:      2+          Capillary Refill  Right Hand: normal capillary refill  Left Hand: normal capillary refill    Edema  Right Upper Leg: absent  Left Upper Leg: absent      Radiographs taken today and reviewed by me demonstrate minimal arthritic change of the bilateral  hip(s).There  is not bone destruction.  There is not a fracture.  MRI was reviewed and demonstrates trochanteric bursitis      Assessment:       Encounter Diagnoses   Name Primary?    Trochanteric bursitis, left hip Yes    Trochanteric bursitis, right hip           Plan:       Lashae was seen today for pain and pain.    Diagnoses and all orders for this visit:    Trochanteric bursitis, left hip    Trochanteric bursitis, right hip    Other orders  -     triamcinolone acetonide injection 40 mg; Inject 1 mL (40 mg total) into the articular space one time.        Treatment options were discussed. Verbal consent was obtained.  After time out was performed and patient ID, side, and site were verified, the left hip  was sterilely prepped in the standard fashion.  A 22-gauge needle was introduced into the  trochanteric bursa without complication.The bursa was then injected with 40 mg Kenalog and 9 cc 1% plain lidocaine.  Sterile dressing was applied.  The patient was informed that they may resume activities as tolerated.     F/U in 6 weeks

## 2018-04-02 NOTE — LETTER
April 2, 2018      Kristen Abreu MD  [address]  [facility] [address]           [facility] - Orthopedics  [address]  [facility] [address]  Phone: [phone]          Patient: Lashae Colvin   MR Number: [id number]   YOB: 1970   Date of Visit: 4/2/2018       Dear Dr. Kristen Abreu:    Thank you for referring Lashae Colvin to me for evaluation. Attached you will find relevant portions of my assessment and plan of care.    If you have questions, please do not hesitate to call me. I look forward to following Lashae Colvin along with you.    Sincerely,    Jorge Mendez MD    Enclosure  CC:  No Recipients    If you would like to receive this communication electronically, please contact [email or web address] or [phone] to request more information on [employer] Link access.    For providers and/or their staff who would like to refer a patient to [facility], please contact us through our one-stop-shop provider referral line, [facility], [place], at [phone].    If you feel you have received this communication in error or would no longer like to receive these types of communications, please e-mail [email or web address]

## 2018-04-12 DIAGNOSIS — M54.2 NECK PAIN: Primary | ICD-10-CM

## 2018-04-23 ENCOUNTER — OFFICE VISIT (OUTPATIENT)
Dept: OPTOMETRY | Facility: CLINIC | Age: 48
End: 2018-04-23
Payer: COMMERCIAL

## 2018-04-23 ENCOUNTER — OFFICE VISIT (OUTPATIENT)
Dept: OPTOMETRY | Facility: CLINIC | Age: 48
End: 2018-04-23

## 2018-04-23 DIAGNOSIS — H52.223 REGULAR ASTIGMATISM OF BOTH EYES: ICD-10-CM

## 2018-04-23 DIAGNOSIS — H52.4 PRESBYOPIA OF BOTH EYES: ICD-10-CM

## 2018-04-23 DIAGNOSIS — H04.123 DRY EYES, BILATERAL: Primary | ICD-10-CM

## 2018-04-23 DIAGNOSIS — H52.13 HIGH MYOPIA, BOTH EYES: ICD-10-CM

## 2018-04-23 DIAGNOSIS — Z46.0 ENCOUNTER FOR FITTING OR ADJUSTMENT OF SPECTACLES OR CONTACT LENSES: Primary | ICD-10-CM

## 2018-04-23 DIAGNOSIS — H35.413 LATTICE DEGENERATION, BOTH EYES: ICD-10-CM

## 2018-04-23 PROCEDURE — 99499 UNLISTED E&M SERVICE: CPT | Mod: S$GLB,,, | Performed by: OPTOMETRIST

## 2018-04-23 PROCEDURE — 92015 DETERMINE REFRACTIVE STATE: CPT | Mod: S$GLB,,, | Performed by: OPTOMETRIST

## 2018-04-23 PROCEDURE — 99999 PR PBB SHADOW E&M-EST. PATIENT-LVL III: CPT | Mod: PBBFAC,,, | Performed by: OPTOMETRIST

## 2018-04-23 PROCEDURE — 92014 COMPRE OPH EXAM EST PT 1/>: CPT | Mod: S$GLB,,, | Performed by: OPTOMETRIST

## 2018-04-23 PROCEDURE — 92310 CONTACT LENS FITTING OU: CPT | Mod: S$GLB,,, | Performed by: OPTOMETRIST

## 2018-04-23 NOTE — PROGRESS NOTES
"HPI     Concerns About Ocular Health    Additional comments: Eye exam and refraction and contact lens follow-up.   Eyes feel "gritty/dry" - Only slight improvement with Restasis, so   discontinued Restasis.  Feels that "vision has changed" since last visit.            Comments   Patient's age: 48 y.o. 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 Paul)  Wears glasses? Yes, wears when without SCLs.     If yes, wears  Full-time or part-time?  As noted above  Present glasses are: Bifocal, SV Distance, SV Reading?  Single vision   distance lenses  Approximate age of present glasses:  4+ years old   Got new glasses following last refraction, or subsequently?:  no   Any problem with VA with glasses?  States that she thinks vision has   changed/decreased.   Wears CLs?:  yes           If yes:              Type of CL worn:  New trial Biofinity Toric SCLs:                                                            OD  8.7   14.5     -7.50 -1.25 x 040                                                            OS  8.7   14.5      -8.50 -1.25 x 140              Wears full-time or part-time:  Full-time daily wear               Sleeps with contact lenses:  no               CL Solution used:  Biotrue, and Chhaya               How often replace CLs:  Monthly, typically              Any problem with VA with CLs?  Patient feels overall her   contacts need  rx adjustments                 Headaches?  No   Eye pain/discomfort?  Persistent dry eye symptoms - no longer using   Restasis                                                                               Flashes?  No  Floaters?  "significant floaters" - "no new ones"  Diplopia/Double vision?  no  Patient's Ocular History:         Any eye surgeries? Laser " "procedures OS (for retina issues - lattice   degeneration)         Any eye injury?  no         Any treatment for eye disease?  As noted above  Family history of eye disease?  Father:  Cataract, and detached retina   following surgery - mother: ARMD (dry in one eye, and wet in the other).   Significant patient medical history:         1. Diabetes?  no       If yes, IDDM or NIDDM? n/a   2. HBP?  Yes.  Takes meds.  Well-controlled per patient              3. Other (describe):  none   ! OTC eyedrops currently using:  and Systane Ultra at bedtime, Blinx    ! Prescription eye meds currently using:  Restasis    ! Any history of allergy/adverse reaction to any eye meds used   previously?  no    ! Any history of allergy/adverse reaction to eyedrops used during prior   eye exam(s)? no   ! Any history of allergy/adverse reaction to Epinephrine or similar meds?   no    ! Patient okay with use of anesthetic eyedrops to check eye pressure?    yes        ! Patient okay with use of eyedrops to dilate pupils today?  yes   !  Allergies/Medications/Medical History/Family History reviewed today?    yes      PD =   68/64  Desired reading distance =  16.25"                                                                      Last edited by Slim Paul, OD on 4/23/2018  4:59 PM. (History)            Assessment /Plan     For exam results, see Encounter Report.    1. Dry eyes, bilateral  lifitegrast (XIIDRA) 5 % Dpet   2. Lattice degeneration, both eyes     3. High myopia, both eyes     4. Regular astigmatism of both eyes     5. Presbyopia of both eyes                  Bilateral dry eye.  Ms. Colvin has used Restasis ophthalmic emulsion, but with little apparent benefit, so has discontinue use.  Suggest trial with Xiidra ophthalmic solution : Instill one drop two times per day, as directed (not with CLs in).  Rx issued for Xiidra.     Use ocular lubricting drops, or CL rewetting drops with CLs in, as often as needed throughout " the day.    High myopia with astigmatism in each eye.  Early presbyopia consistent with age.  New spectacle lens Rx issued for use in lieu of CLs.    Wears SCLs. Good lens fit in each eye with Biofinity Toric SCLs.  Power of each CL okay as is.   New (duplicate) contact lens Rx issued.  Suggest use of +1.50 or +1.75 OTC reading glasses over CLs as needed.    Prior diagnosis of peripheral lattice retinal degeneration.     Follow up with Dr. Steward in retina clinic as he recommends, but retina appears stable in each eye per examination done today.    Repeat general eye examination and refraction, and contact lens follow up, in one year.

## 2018-04-23 NOTE — PATIENT INSTRUCTIONS
Bilateral dry eye.  Ms. Colvin has used Restasis ophthalmic emulsion, but with little apparent benefit, so has discontinue use.  Suggest trial with Xiidra ophthalmic solution : Instill one drop two times per day, as directed (not with CLs in).  Rx issued for Xiidra.     Use ocular lubricting drops, or CL rewetting drops with CLs in, as often as needed throughout the day.    High myopia with astigmatism in each eye.  Early presbyopia consistent with age.  New spectacle lens Rx issued for use in lieu of CLs.    Wears SCLs. Good lens fit in each eye with Biofinity Toric SCLs.  Power of each CL okay as is.   New (duplicate) contact lens Rx issued.  Suggest use of +1.50 or +1.75 OTC reading glasses over CLs as needed.    Prior diagnosis of peripheral lattice retinal degeneration.     Follow up with Dr. Steward in retina clinic as he recommends, but retina appears stable in each eye per examination done today.    Repeat general eye examination and refraction, and contact lens follow up, in one year.

## 2018-04-23 NOTE — PATIENT INSTRUCTIONS
Bilateral dry eye.  Ms. Colvin has used Restasis ophthalmic emulsion, but with little apparent benefit, so has discontinue use.  Suggest trial with Xiidra ophthalmic solution : Instill one drop two times per day, as directed (not with CLs in).  Rx issued for Xiidra.     Use ocular lubricting drops, or CL rewetting drops with CLs in, as often as needed throughout the day.    High myopia with astigmatism in each eye.  Early presbyopia consistent with age.  New spectacle lens Rx issued for use in lieu of CLs.    Wears SCLs. Good lens fit in each eye with Biofinity Toric SCLs.  Power of each CL okay as is.   New (duplicate) contact lens Rx issued.  Suggest use of +1.50 or +1.75 OTC reading glasses over CLs as needed.    Follow up with Dr. Steward in retina clinic as he recommends, but retina appears stable in each eye per examination done today.    Repeat general eye examination and refraction, and contact lens follow up, in one year.

## 2018-04-23 NOTE — PROGRESS NOTES
HPI     Patient in today for contact lens follow-up with general eye examination.    Refer to additional patient encounter notes dated 04/23/2018.       Last edited by Slim Paul, OD on 4/23/2018  4:26 PM. (History)            Assessment /Plan     For exam results, see Encounter Report.    1. Encounter for fitting or adjustment of spectacles or contact lenses                    Bilateral dry eye.  Ms. Colvin has used Restasis ophthalmic emulsion, but with little apparent benefit, so has discontinue use.  Suggest trial with Xiidra ophthalmic solution : Instill one drop two times per day, as directed (not with CLs in).  Rx issued for Xiidra.     Use ocular lubricting drops, or CL rewetting drops with CLs in, as often as needed throughout the day.    High myopia with astigmatism in each eye.  Early presbyopia consistent with age.  New spectacle lens Rx issued for use in lieu of CLs.    Wears SCLs. Good lens fit in each eye with Biofinity Toric SCLs.  Power of each CL okay as is.   New (duplicate) contact lens Rx issued.  Suggest use of +1.50 or +1.75 OTC reading glasses over CLs as needed.    Follow up with Dr. Steward in retina clinic as he recommends, but retina appears stable in each eye per examination done today.    Repeat general eye examination and refraction, and contact lens follow up, in one year.

## 2018-04-24 ENCOUNTER — HOSPITAL ENCOUNTER (OUTPATIENT)
Dept: RADIOLOGY | Facility: HOSPITAL | Age: 48
Discharge: HOME | End: 2018-04-24
Attending: PHYSICIAN ASSISTANT
Payer: COMMERCIAL

## 2018-04-24 ENCOUNTER — OFFICE VISIT (OUTPATIENT)
Dept: ORTHOPEDICS | Facility: CLINIC | Age: 48
End: 2018-04-24
Payer: COMMERCIAL

## 2018-04-24 VITALS — WEIGHT: 167.44 LBS | BODY MASS INDEX: 26.91 KG/M2 | HEIGHT: 66 IN

## 2018-04-24 DIAGNOSIS — M54.2 NECK PAIN: ICD-10-CM

## 2018-04-24 DIAGNOSIS — M54.12 CERVICAL RADICULOPATHY: Primary | ICD-10-CM

## 2018-04-24 PROCEDURE — 99214 OFFICE O/P EST MOD 30 MIN: CPT | Mod: S$GLB,,, | Performed by: PHYSICIAN ASSISTANT

## 2018-04-24 PROCEDURE — 72050 X-RAY EXAM NECK SPINE 4/5VWS: CPT | Mod: TC

## 2018-04-24 PROCEDURE — 99999 PR PBB SHADOW E&M-EST. PATIENT-LVL III: CPT | Mod: PBBFAC,,, | Performed by: PHYSICIAN ASSISTANT

## 2018-04-24 PROCEDURE — 72050 X-RAY EXAM NECK SPINE 4/5VWS: CPT | Mod: 26,,, | Performed by: RADIOLOGY

## 2018-04-24 RX ORDER — METHOCARBAMOL 750 MG/1
750 TABLET, FILM COATED ORAL 3 TIMES DAILY
Qty: 30 TABLET | Refills: 0 | Status: SHIPPED | OUTPATIENT
Start: 2018-04-24 | End: 2018-05-14

## 2018-04-24 RX ORDER — DIAZEPAM 5 MG/1
5 TABLET ORAL
Qty: 2 TABLET | Refills: 0 | Status: SHIPPED | OUTPATIENT
Start: 2018-04-24 | End: 2018-08-09

## 2018-04-24 RX ORDER — METHYLPREDNISOLONE 4 MG/1
TABLET ORAL
Qty: 1 PACKAGE | Refills: 0 | Status: SHIPPED | OUTPATIENT
Start: 2018-04-24 | End: 2018-08-09

## 2018-04-24 NOTE — LETTER
April 25, 2018      Jorge Mendez MD  [address]  [place] [address]           [facility]  [address]  [facility] [address]  Phone: [phone]          Patient: Lashae Colvin   MR Number: [id number]   YOB: 1970   Date of Visit: 4/24/2018       Dear Dr. Jorge Mendez:    Thank you for referring Lashae Colvin to me for evaluation. Attached you will find relevant portions of my assessment and plan of care.    If you have questions, please do not hesitate to call me. I look forward to following Lashae Colvin along with you.    Sincerely,    April Leggett PA-C    Enclosure  CC:  No Recipients    If you would like to receive this communication electronically, please contact [email or web address] or [phone] to request more information on "[employer] Link access.    For providers and/or their staff who would like to refer a patient to [facility], please contact us through our one-stop-shop provider referral line, [facility], at [phone].    If you feel you have received this communication in error or would no longer like to receive these types of communications, please e-mail [email or web address]

## 2018-04-25 NOTE — PROGRESS NOTES
"DATE: 4/25/2018  PATIENT: Lashae Colvin    Supervising Physician: Anthony Mendez M.D.    CHIEF COMPLAINT: neck and left arm pain    HISTORY:  Lashae Colvin is a 48 y.o. female here for initial evaluation of neck and left arm pain (Neck - 3, Arm - 4). The pain has been present for about 4-6 weeks.  She reports similar symptoms about 6 years ago that resolved with conservative treatment.  The patient describes the pain as burning and "ripping." The pain is worse with cervical rotation and improved by nothing in particular. There is associated numbness and tingling in the thumb, index and middle fingers on the left. There is subjective weakness.  She reports dropping things and difficulty with small objects.  Prior treatments have included ibuprofen, aleve, PT in 2012, traction and home exercises but no recent PT, ESIs or surgery.     The patient denies myelopathic symptoms such as handwriting changes or difficulty with buttons.  She does report difficulty with keys/coins/small objects.  Denies perineal paresthesias, bowel/bladder dysfunction.    PAST MEDICAL/SURGICAL HISTORY:  Past Medical History:   Diagnosis Date    Arthritis     cervical spine DJD    Cancer     CIS of cervis s/p conization    Headache(784.0)     Hyperlipidemia     Hypertension     PTSD (post-traumatic stress disorder)      Past Surgical History:   Procedure Laterality Date    conization      for cervical CA in 2002    HAND SURGERY      R hand, repair of a severed tendon    TONSILLECTOMY         Medications:  Current Outpatient Prescriptions on File Prior to Visit   Medication Sig Dispense Refill    allopurinol (ZYLOPRIM) 300 MG tablet Take 300 mg by mouth once daily.      ALPRAZolam (XANAX) 0.5 MG tablet Take 1 tab 30 min prior to MRI - ok to take 2nd dose if 1st dose not effective after 30 minutes 2 tablet 0    CALCIUM-VITAMIN D3 ORAL Take 1 tablet by mouth once daily.      fluticasone (FLONASE) 50 mcg/actuation " nasal spray   0    lifitegrast (XIIDRA) 5 % Dpet Place 1 drop into both eyes 2 (two) times daily. 60 each 11    lisinopril 10 MG tablet Take 1 tablet (10 mg total) by mouth once daily. 90 tablet 3    naproxen (NAPROSYN) 500 MG tablet Take 1 tablet (500 mg total) by mouth 2 (two) times daily with meals. 28 tablet 0    prazosin (MINIPRESS) 2 MG Cap take 2 capsules by mouth every evening 60 capsule 11    predniSONE (DELTASONE) 10 MG tablet Take 4 pills daily x 1 day, then 3 pills daily x1 day, then 2 pill daily x 1 days, then 1 pill daily x 1 day then 1/2 pill daily for 2 days. 11 tablet 0    ranitidine (ZANTAC) 75 MG tablet Take 1 tablet (75 mg total) by mouth 2 (two) times daily. 60 tablet 0    albuterol 90 mcg/actuation inhaler Inhale 2 puffs into the lungs every 6 (six) hours as needed for Wheezing. 18 g 11    omega-3 fatty acids-vitamin E (FISH OIL) 1,000 mg Cap Take 2,000 mg by mouth 2 (two) times daily.  0     No current facility-administered medications on file prior to visit.        Social History:   Social History     Social History    Marital status: Significant Other     Spouse name: N/A    Number of children: N/A    Years of education: N/A     Occupational History     [employer]     Social History Main Topics    Smoking status: Former Smoker     Quit date: 4/11/2001    Smokeless tobacco: Never Used    Alcohol use 3.0 oz/week     5 Glasses of wine per week    Drug use: No    Sexual activity: Yes     Partners: Male     Birth control/ protection: Sponge, Spermicide     Other Topics Concern    Not on file     Social History Narrative    Lives w/boyfriend    Originally from [place]    Moved to [facility] in 1998       REVIEW OF SYSTEMS:  Constitution: Negative. Negative for chills, fever and night sweats.   Cardiovascular: Negative for chest pain and syncope.   Respiratory: Negative for cough and shortness of breath.   Gastrointestinal: See HPI. Negative for  "nausea/vomiting. Negative for abdominal pain.  Genitourinary: See HPI. Negative for discoloration or dysuria.  Skin: Negative for dry skin, itching and rash.   Hematologic/Lymphatic: Negative for bleeding problem. Does not bruise/bleed easily.   Musculoskeletal: Negative for falls and muscle weakness.   Neurological: See HPI. No seizures.   Endocrine: Negative for polydipsia, polyphagia and polyuria.   Allergic/Immunologic: Negative for hives and persistent infections.  Psychiatric/Behavioral: Negative for depression and insomnia.         EXAM:  Ht 5' 6" (1.676 m)   Wt 76 kg (167 lb 7 oz)   BMI 27.03 kg/m²     General: The patient is a very pleasant 48 y.o. female in no apparent distress, the patient is oriented to person, place and time.  Psych: Normal mood and affect  HEENT: Vision grossly intact, hearing intact to the spoken word.  Lungs: Respirations unlabored.  Gait: Normal station and gait, no difficulty with toe or heel walk.   Skin: Cervical skin negative for rashes, lesions, hairy patches and surgical scars.  Range of motion: Cervical range of motion is acceptable. There is mild tenderness to palpation.  Spinal Balance: Global saggital and coronal spinal balance acceptable, no significant for scoliosis and kyphosis.  Musculoskeletal: No pain with the range of motion of the bilateral shoulders and elbows. Normal bulk and contour of the bilateral hands.  Vascular: Bilateral hands warm and well perfused, radial pulses 2+ bilaterally.  Neurological: Normal strength and tone in all major motor groups in the bilateral upper and lower extremities with the exception of decreased strength with elbow extension and wrist extension on the left. Normal sensation to light touch in the C5-T1 and L2-S1 dermatomes bilaterally with the exception of decreased sensation in the C6 and C7 dermatomes on the left.  Deep tendon reflexes symmetric 2+ in the bilateral upper and lower extremities.  Negative Inverted Radial Reflex " and Fuentes's bilaterally. Negative Babinski bilaterally.     IMAGING:   Today I personally reviewed AP, Lat and Flex/Ex  upright C-spine films that demonstrate C6/7 disc space narrowing with reversal of the normal cervical lordosis.       ASSESSMENT/PLAN:    Diagnoses and all orders for this visit:    Cervical radiculopathy  -     MRI Cervical Spine Without Contrast; Future    Other orders  -     methylPREDNISolone (MEDROL DOSEPACK) 4 mg tablet; use as directed  -     methocarbamol (ROBAXIN) 750 MG Tab; Take 1 tablet (750 mg total) by mouth 3 (three) times daily. As needed for muscle spasms  -     diazePAM (VALIUM) 5 MG tablet; Take 1 tablet (5 mg total) by mouth On call Procedure for Anxiety (take 1 30 min prior to procedure. may take 2nd if needed).    MRI cervical spine.  Follow up after the MRI to discuss results and further treatment.      Follow-up if symptoms worsen or fail to improve.

## 2018-04-26 ENCOUNTER — TELEPHONE (OUTPATIENT)
Dept: OPTOMETRY | Facility: CLINIC | Age: 48
End: 2018-04-26

## 2018-05-01 ENCOUNTER — PATIENT MESSAGE (OUTPATIENT)
Dept: INTERNAL MEDICINE | Facility: CLINIC | Age: 48
End: 2018-05-01

## 2018-05-01 DIAGNOSIS — M10.9 GOUT, UNSPECIFIED CAUSE, UNSPECIFIED CHRONICITY, UNSPECIFIED SITE: Primary | ICD-10-CM

## 2018-05-01 RX ORDER — ALLOPURINOL 300 MG/1
300 TABLET ORAL DAILY
Qty: 90 TABLET | Refills: 3 | Status: SHIPPED | OUTPATIENT
Start: 2018-05-01 | End: 2019-05-01 | Stop reason: SDUPTHER

## 2018-05-04 ENCOUNTER — HOSPITAL ENCOUNTER (OUTPATIENT)
Dept: RADIOLOGY | Facility: HOSPITAL | Age: 48
Discharge: HOME | End: 2018-05-04
Attending: PHYSICIAN ASSISTANT
Payer: COMMERCIAL

## 2018-05-04 DIAGNOSIS — M54.12 CERVICAL RADICULOPATHY: ICD-10-CM

## 2018-05-04 PROCEDURE — 72141 MRI NECK SPINE W/O DYE: CPT | Mod: 26,,, | Performed by: RADIOLOGY

## 2018-05-04 PROCEDURE — 72141 MRI NECK SPINE W/O DYE: CPT | Mod: TC

## 2018-05-09 ENCOUNTER — OFFICE VISIT (OUTPATIENT)
Dept: ORTHOPEDICS | Facility: CLINIC | Age: 48
End: 2018-05-09
Payer: COMMERCIAL

## 2018-05-09 VITALS — HEIGHT: 66 IN | WEIGHT: 170.94 LBS | BODY MASS INDEX: 27.47 KG/M2

## 2018-05-09 DIAGNOSIS — M54.12 CERVICAL RADICULOPATHY: Primary | ICD-10-CM

## 2018-05-09 PROCEDURE — 3008F BODY MASS INDEX DOCD: CPT | Mod: CPTII,S$GLB,, | Performed by: PHYSICIAN ASSISTANT

## 2018-05-09 PROCEDURE — 99213 OFFICE O/P EST LOW 20 MIN: CPT | Mod: S$GLB,,, | Performed by: PHYSICIAN ASSISTANT

## 2018-05-09 PROCEDURE — 99999 PR PBB SHADOW E&M-EST. PATIENT-LVL III: CPT | Mod: PBBFAC,,, | Performed by: PHYSICIAN ASSISTANT

## 2018-05-09 NOTE — PROGRESS NOTES
"DATE: 5/9/2018  PATIENT: Lashae Colvin    Attending Physician: Anthony Mendez M.D.    HISTORY:  Lashae Colvin is a 48 y.o. female who returns to me today for MRI results.  She was last seen by me 4/24/2018.  Today she is doing well but notes the steroid pack did not provide much relief.    The Patient denies myelopathic symptoms such as handwriting changes or difficulty with buttons/coins/keys. Denies perineal paresthesias, bowel/bladder dysfunction.    PMH/PSH/FamHx/SocHx:  Unchanged from prior visit    ROS:  REVIEW OF SYSTEMS:  Constitution: Negative. Negative for chills, fever and night sweats.   HENT: Negative for congestion and headaches.    Eyes: Negative for blurred vision, left vision loss and right vision loss.   Cardiovascular: Negative for chest pain and syncope.   Respiratory: Negative for cough and shortness of breath.    Endocrine: Negative for polydipsia, polyphagia and polyuria.   Hematologic/Lymphatic: Negative for bleeding problem. Does not bruise/bleed easily.   Skin: Negative for dry skin, itching and rash.   Musculoskeletal: Negative for falls and muscle weakness.   Gastrointestinal: Negative for abdominal pain and bowel incontinence.   Allergic/Immunologic: Negative for hives and persistent infections.  Genitourinary: Negative for urinary retention/incontinence and nocturia.   Neurological: Negative for disturbances in coordination, no myelopathic symptoms such as handwriting changes or difficulty with buttons, coins, keys or small objects. No loss of balance and seizures.   Psychiatric/Behavioral: Negative for depression. The patient does not have insomnia.   Denies myelopathic symptoms, perineal paresthesias, bowel or bladder incontinence    EXAM:  Ht 5' 6" (1.676 m)   Wt 77.6 kg (170 lb 15.5 oz)   BMI 27.59 kg/m²     My physical examination was notable for the following findings:     Normal station and gait, no difficulty with toe or heel walk.   Cervical skin negative " for rashes, lesions, hairy patches and surgical scars.   Cervical range of motion is acceptable.  There is mild tenderness to palpation.  No pain with range of motion of the bilateral shoulders and elbows.  Normal bulk and contour of the bilateral hands.    Bilateral hands warm and well perfused, radial pulses 2+ bilaterally.   Normal strength and tone in all major motor groups in the bilateral upper and lower extremities with the exception of decreased strength with elbow extension and wrist extension on the left. Normal sensation to light touch in the C5-T1 and L2-S1 dermatomes bilaterally with the exception of decreased sensation in the C6 and C7 dermatomes on the left.  Deep tendon reflexes symmetric 2+ in the bilateral upper and lower extremities.  Negative Inverted Radial Reflex and Fuentes's bilaterally. Negative Babinski bilaterally.     IMAGING:  No new imaging today.    Today I personally re-reviewed AP, Lat and Flex/Ex  upright C-spine films that demonstrate C5/6 and C6/7 disc space narrowing with reversal of the normal cervical lordosis.    MRI cervical spine shows moderate left neural foraminal narrowing at C5/6 and C6/7.       ASSESSMENT/PLAN:    Lashae was seen today for pain.    Diagnoses and all orders for this visit:    Cervical radiculopathy  -     Ambulatory Referral to Physical/Occupational Therapy  -     HME - OTHER        She would like to try conservative treatment first.  She would like to hold off on injections.  Referral for PT placed to [facility].  Follow up as needed.  She could be a candidate for a C5/6/7 ACDF in the future if necessary.     Follow-up if symptoms worsen or fail to improve.

## 2018-06-06 ENCOUNTER — PATIENT MESSAGE (OUTPATIENT)
Dept: INTERNAL MEDICINE | Facility: CLINIC | Age: 48
End: 2018-06-06

## 2018-06-06 DIAGNOSIS — M25.551 PAIN OF BOTH HIP JOINTS: ICD-10-CM

## 2018-06-06 DIAGNOSIS — M76.892 ENTHESOPATHY OF HIP REGION ON BOTH SIDES: ICD-10-CM

## 2018-06-06 DIAGNOSIS — M25.552 PAIN OF BOTH HIP JOINTS: ICD-10-CM

## 2018-06-06 DIAGNOSIS — M76.891 ENTHESOPATHY OF HIP REGION ON BOTH SIDES: ICD-10-CM

## 2018-06-06 RX ORDER — METHOCARBAMOL 500 MG/1
500 TABLET, FILM COATED ORAL EVERY 8 HOURS PRN
Qty: 30 TABLET | Refills: 0 | Status: SHIPPED | OUTPATIENT
Start: 2018-06-06 | End: 2018-06-16

## 2018-06-06 RX ORDER — NAPROXEN 500 MG/1
500 TABLET ORAL 2 TIMES DAILY WITH MEALS
Qty: 28 TABLET | Refills: 0 | Status: SHIPPED | OUTPATIENT
Start: 2018-06-06 | End: 2018-06-20

## 2018-06-07 ENCOUNTER — PATIENT MESSAGE (OUTPATIENT)
Dept: INTERNAL MEDICINE | Facility: CLINIC | Age: 48
End: 2018-06-07

## 2018-07-02 ENCOUNTER — HOSPITAL ENCOUNTER (OUTPATIENT)
Dept: RADIOLOGY | Facility: OTHER | Age: 48
Discharge: HOME | End: 2018-07-02
Attending: INTERNAL MEDICINE
Payer: COMMERCIAL

## 2018-07-02 DIAGNOSIS — Z12.39 SCREENING FOR BREAST CANCER: ICD-10-CM

## 2018-07-02 PROCEDURE — 77067 SCR MAMMO BI INCL CAD: CPT | Mod: 26,,, | Performed by: RADIOLOGY

## 2018-07-02 PROCEDURE — 77063 BREAST TOMOSYNTHESIS BI: CPT | Mod: TC

## 2018-07-02 PROCEDURE — 77063 BREAST TOMOSYNTHESIS BI: CPT | Mod: 26,,, | Performed by: RADIOLOGY

## 2018-07-09 ENCOUNTER — PATIENT MESSAGE (OUTPATIENT)
Dept: INTERNAL MEDICINE | Facility: CLINIC | Age: 48
End: 2018-07-09

## 2018-07-09 DIAGNOSIS — M54.30 SCIATICA, UNSPECIFIED LATERALITY: Primary | ICD-10-CM

## 2018-07-09 NOTE — TELEPHONE ENCOUNTER
Made appt for 8/21/18 at 1100 am sent the message to inform pt of her appts. And also let her know about the appt with Dr. Abreu 8/21/18 at 1100 am and that she could cancel if she has improved

## 2018-07-09 NOTE — TELEPHONE ENCOUNTER
Please notify pt that I ordered PT   Also referred her to back and spine clinic - if not improvement or sx worsen then rec she f/u with that clinic for evaluation - rec schedule appt in 4-6 weeks and if not needed then ok for pt to cancel

## 2018-07-10 ENCOUNTER — PATIENT MESSAGE (OUTPATIENT)
Dept: INTERNAL MEDICINE | Facility: CLINIC | Age: 48
End: 2018-07-10

## 2018-07-10 RX ORDER — NAPROXEN 500 MG/1
500 TABLET ORAL 2 TIMES DAILY
Qty: 30 TABLET | Refills: 0 | Status: SHIPPED | OUTPATIENT
Start: 2018-07-10 | End: 2018-09-26

## 2018-07-10 RX ORDER — METHOCARBAMOL 500 MG/1
500 TABLET, FILM COATED ORAL EVERY 8 HOURS PRN
Qty: 45 TABLET | Refills: 0 | Status: SHIPPED | OUTPATIENT
Start: 2018-07-10 | End: 2018-07-20

## 2018-07-10 NOTE — TELEPHONE ENCOUNTER
Sent message to inform pt that she has a referral for Back and Spine if she needs it after 4-6 wks of therapy

## 2018-07-10 NOTE — TELEPHONE ENCOUNTER
Refills sent in     Please review message from me yesterday:      Please notify pt that I ordered PT   Also referred her to back and spine clinic - if not improvement or sx worsen then rec she f/u with that clinic for evaluation - rec schedule appt in 4-6 weeks and if not needed then ok for pt to cancel    rec pt f/u with back and spine clinic if not improvement with PT - please schedule with them

## 2018-07-18 ENCOUNTER — CLINICAL SUPPORT (OUTPATIENT)
Dept: REHABILITATION | Facility: HOSPITAL | Age: 48
End: 2018-07-18
Attending: INTERNAL MEDICINE
Payer: COMMERCIAL

## 2018-07-18 DIAGNOSIS — M54.40 BILATERAL LOW BACK PAIN WITH SCIATICA, SCIATICA LATERALITY UNSPECIFIED, UNSPECIFIED CHRONICITY: ICD-10-CM

## 2018-07-18 PROBLEM — M54.50 BILATERAL LOW BACK PAIN: Status: ACTIVE | Noted: 2018-07-18

## 2018-07-18 PROCEDURE — 97162 PT EVAL MOD COMPLEX 30 MIN: CPT

## 2018-07-18 PROCEDURE — 97110 THERAPEUTIC EXERCISES: CPT

## 2018-07-18 NOTE — PLAN OF CARE
[facility]  Physical Therapy Initial Evaluation    Name: Lashae Colvin  Clinic Number: [id number]    Therapy Diagnosis:   Encounter Diagnosis   Name Primary?    Bilateral low back pain with sciatica, sciatica laterality unspecified, unspecified chronicity      Physician: Kristen Abreu MD    Physician Orders: PT Eval and Treat   Medical Diagnosis from Referral: Sciatica, unspecified laterality  - Primary   Evaluation Date: 7/18/2018  Authorization Period Expiration: 12/31/18  Plan of Care Expiration: 10/10/18  Visit # / Visits authorized: 1/ 20    Time In: 1400  Time Out: 1500  Total Billable Time: 60 minutes    Precautions: Standard and hx of cancer    Subjective   Date of onset: June 2018  History of current condition - Lashae reports: that she started having hip pain in April 2018.  Then, in June started having some sharp pain in lower back that shoots down R LE to lower calf.  Sometimes has pain on the L but not like the R.  Denies numbness/tingling in B LE.  Insidious onset.         Past Medical History:   Diagnosis Date    Arthritis     cervical spine DJD    Cancer     CIS of cervis s/p conization    Headache(784.0)     Hyperlipidemia     Hypertension     PTSD (post-traumatic stress disorder)      Lashae Colvin  has a past surgical history that includes Hand surgery; conization; and Tonsillectomy.    Lashae has a current medication list which includes the following prescription(s): albuterol, allopurinol, alprazolam, calcium/vitamin d3, diazepam, fluticasone, lifitegrast, lisinopril, methocarbamol, methylprednisolone, naproxen, omega-3 fatty acids-vitamin e, prazosin, prednisone, and ranitidine.    Review of patient's allergies indicates:  No Known Allergies     Imaging, none:    Prior Therapy: OT for hand  Social History: Lives in 2 story apartment lives with their partner  Occupation: Pt is  which requires mostly sitting and some  walking.  Prior Level of Function: Independent, no limitiations  Current Level of Function: difficulty getting into/out of car, difficulty driving, unable to bend over    Pain:  Current 4/10, worst 8/10, best 2/10   Location: bilateral (R worse than L) back and R LE  Description: Sharp, shooting  Aggravating Factors: Sitting, laying on back/side, in the morning  Easing Factors: walking    Pts goals: To have more flexibility throughout hips to return to PLOF.    Objective     Observation: Pt is healthy and in no acute distress.    Lumbar Range of Motion:    Limitations Pain   Flexion Mod loss   LBP radiating down R LE to knee        Extension Min loss   Central LBP        Left Side Bending Min loss L LBP        Right Side Bending Min loss  R LBP            Lower Extremity Strength  Right LE  Left LE    Quadriceps: 4-/5 Quadriceps: 5/5   Hamstrings: 4-/5 Hamstrings: 4/5   Iliospoas: 4-/5 Iliospoas: 4+/5   Hip extension:  3+/5 Hip extension: 3/5   PGM: 3+/5 PGM: 3/5   Hip ER:  3+/5 Hip ER: 5/5   Hip IR: 3+/5 Hip IR: 5/5   Ankle dorsiflexion: 5/5 Ankle dorsiflexion: 5/5   Ankle eversion: 5/5 Ankle eversion: 5/5     Dermatomes (sensation): intact to light touch  Myotomes: normal but weak on the R    Special Tests:  -SLR Test: pos on the R at > 60 deg   -Repeated Flexion: pos for peripheralization of pain  -Repeated Ext: pos in prone for centralization of pain  -Bridge Test: NT    Joint Mobility: normal throughout LS    Palpation: TTP along B PSIS, sacral base, B greater trochanter, B piriformis    Flexibility:    Ely's test: R = neg ; L = neg   Hamstrings: R = mod tightness and pain ; L = mod tightness      CMS Impairment/Limitation/Restriction for FOTO Back Survey    Therapist reviewed FOTO scores for Lashae Colvin on 7/18/2018.   FOTO documents entered into [employer] - see Media section.    Limitation Score: 38%         TREATMENT   Treatment Time In: 1430  Treatment Time Out: 1440  Total Treatment time separate  from Evaluation: 10 minutes    Lashae received therapeutic exercises to develop flexibility, posture and core stabilization for 10 minutes including:  Prone press ups      Home Exercises and Patient Education Provided    Education provided re: goals for therapy, role of therapy for care, exercises/HEP - when to continue and when to stop exercise     Written Home Exercises Provided: prone press ups.  Exercises were reviewed and Lashae was able to demonstrate them prior to the end of the session.   Pt received a written copy of exercises to perform at home. Lashae demonstrated good  understanding of the education provided.     See EMR under patient instructions for exercises given.   Assessment   Lashae is a 48 y.o. female referred to outpatient Physical Therapy with a medical diagnosis of sciatica. Pt's c/c is LBP with radiating pain down R LE to lower calf.  Pt presents with decreased lumbar ROM, decreased core/LE strength (R worse than L), and decreased flexibility.  Pt will benefit from physical therapy, specifically a Desiree extension based exercises and core/hip strengthening, in order to reduce her c/o pain, improve impairments and functional limitations.    Pt prognosis is Good.   Pt will benefit from skilled outpatient Physical Therapy to address the deficits stated above and in the chart below, provide pt/family education, and to maximize pt's level of independence.     Plan of care discussed with patient: Yes  Pt's spiritual, cultural and educational needs considered and patient is agreeable to the plan of care and goals as stated below:     Anticipated Barriers for therapy: none    Medical Necessity is demonstrated by the following  History  Co-morbidities and personal factors that may impact the plan of care Co-morbidities:   history of cancer and PTSD    Personal Factors:   no deficits     mod   Examination  Body Structures and Functions, activity limitations and participation restrictions that  may impact the plan of care Body Regions:   back  lower extremities    Body Systems:    ROM  strength  motor learning  flexibility    Participation Restrictions:   Bending, prolonged sitting    Activity limitations:   Learning and applying knowledge  no deficits    General Tasks and Commands  no deficits    Communication  no deficits    Mobility  no deficits    Self care  no deficits    Domestic Life  doing house work (cleaning house, washing dishes, laundry)    Interactions/Relationships  no deficits    Life Areas  no deficits    Community and Social Life  no deficits         high   Clinical Presentation evolving clinical presentation with changing clinical characteristics moderate   Decision Making/ Complexity Score: moderate       GOALS: Short Term Goals:  6 weeks  1.Report decreased LBP pain  < / =  2 /10  to increase tolerance for ADLs, prolonged sitting.  2. Pt to increase B LE flexibility to min to no loss in order to improve posture and gait.   3. Increase lumbar ROM to min to no loss without c/o pain in order to improve functional mobility.    4. Increase strength by 1/3 MMT grade in B LE in order to improve tolerance for ADLs.  5. Pt to tolerate HEP to improve ROM and independence with ADL's      Long Term Goals: 12 weeks  1.Report decreased    LBP pain  <   / =  0-1  /10  to increase tolerance for ADLs, prolonged sitting.  2.Increase strength to >/= 4/5 MMT grade for  B LE  to increase tolerance for ADL and work activities.  3. Pt to demonstrate negative Bridge Test in order to show improved core strength for lumbar stabilization.   4. Patient's goal: To have more flexibility throughout hips to return to PLOF.      Plan   Plan of care Certification: 7/18/2018 to 10/10/18    Outpatient Physical Therapy 2 times weekly for 12 weeks to include the following interventions: Cervical/Lumbar Traction, Electrical Stimulation IFC, Manual Therapy, Moist Heat/ Ice, Neuromuscular Re-ed, Patient Education, Therapeutic  Activites and Therapeutic Exercise.     Susie Holliday, PT, DPT, OCS

## 2018-08-03 DIAGNOSIS — M10.9 GOUT, UNSPECIFIED CAUSE, UNSPECIFIED CHRONICITY, UNSPECIFIED SITE: Primary | ICD-10-CM

## 2018-08-03 DIAGNOSIS — I10 HYPERTENSION, BENIGN: ICD-10-CM

## 2018-08-03 DIAGNOSIS — Z00.00 ANNUAL PHYSICAL EXAM: ICD-10-CM

## 2018-08-03 RX ORDER — LISINOPRIL 10 MG/1
10 TABLET ORAL DAILY
Qty: 30 TABLET | Refills: 0 | Status: SHIPPED | OUTPATIENT
Start: 2018-08-03 | End: 2018-08-31 | Stop reason: SDUPTHER

## 2018-08-03 NOTE — TELEPHONE ENCOUNTER
Pt due for annual labs - last were from outside lab in 6/2017 - please arrange lab prior to annual appt this month - thanks

## 2018-08-06 NOTE — TELEPHONE ENCOUNTER
called pt LVM stating:    Jory, this is Yamile I've received your request I'm returning your call from Dr.Gendusa granda at [facility]. I just wanted to let you know that your refill has been sneto the pharmacy and you are due for your labs before your appointment on (08/21/2018)    *please scheduled labs 3 days before appt on 08/21/2018*

## 2018-08-31 DIAGNOSIS — I10 HYPERTENSION, BENIGN: ICD-10-CM

## 2018-08-31 RX ORDER — LISINOPRIL 10 MG/1
TABLET ORAL
Qty: 30 TABLET | Refills: 0 | Status: SHIPPED | OUTPATIENT
Start: 2018-08-31 | End: 2018-10-01 | Stop reason: SDUPTHER

## 2018-09-26 ENCOUNTER — LAB VISIT (OUTPATIENT)
Dept: LAB | Facility: OTHER | Age: 48
End: 2018-09-26
Attending: INTERNAL MEDICINE
Payer: COMMERCIAL

## 2018-09-26 ENCOUNTER — OFFICE VISIT (OUTPATIENT)
Dept: INTERNAL MEDICINE | Facility: CLINIC | Age: 48
End: 2018-09-26
Attending: INTERNAL MEDICINE
Payer: COMMERCIAL

## 2018-09-26 VITALS
BODY MASS INDEX: 27.99 KG/M2 | OXYGEN SATURATION: 100 % | HEIGHT: 66 IN | SYSTOLIC BLOOD PRESSURE: 124 MMHG | WEIGHT: 174.19 LBS | DIASTOLIC BLOOD PRESSURE: 82 MMHG | HEART RATE: 78 BPM

## 2018-09-26 DIAGNOSIS — M10.9 GOUT, UNSPECIFIED CAUSE, UNSPECIFIED CHRONICITY, UNSPECIFIED SITE: ICD-10-CM

## 2018-09-26 DIAGNOSIS — Z00.00 ANNUAL PHYSICAL EXAM: Primary | ICD-10-CM

## 2018-09-26 DIAGNOSIS — Z00.00 ANNUAL PHYSICAL EXAM: ICD-10-CM

## 2018-09-26 DIAGNOSIS — I10 ESSENTIAL HYPERTENSION: ICD-10-CM

## 2018-09-26 DIAGNOSIS — J30.89 NON-SEASONAL ALLERGIC RHINITIS, UNSPECIFIED TRIGGER: ICD-10-CM

## 2018-09-26 DIAGNOSIS — E78.5 HYPERLIPIDEMIA, UNSPECIFIED HYPERLIPIDEMIA TYPE: ICD-10-CM

## 2018-09-26 LAB
25(OH)D3+25(OH)D2 SERPL-MCNC: 25 NG/ML
ALBUMIN SERPL BCP-MCNC: 4.3 G/DL
ALP SERPL-CCNC: 55 U/L
ALT SERPL W/O P-5'-P-CCNC: 21 U/L
ANION GAP SERPL CALC-SCNC: 13 MMOL/L
AST SERPL-CCNC: 14 U/L
BASOPHILS # BLD AUTO: 0.03 K/UL
BASOPHILS NFR BLD: 0.6 %
BILIRUB SERPL-MCNC: 0.4 MG/DL
BUN SERPL-MCNC: 9 MG/DL
CALCIUM SERPL-MCNC: 9.7 MG/DL
CHLORIDE SERPL-SCNC: 103 MMOL/L
CHOLEST SERPL-MCNC: 244 MG/DL
CHOLEST/HDLC SERPL: 4.1 {RATIO}
CO2 SERPL-SCNC: 24 MMOL/L
CREAT SERPL-MCNC: 0.8 MG/DL
DIFFERENTIAL METHOD: ABNORMAL
EOSINOPHIL # BLD AUTO: 0.2 K/UL
EOSINOPHIL NFR BLD: 3 %
ERYTHROCYTE [DISTWIDTH] IN BLOOD BY AUTOMATED COUNT: 13.5 %
EST. GFR  (AFRICAN AMERICAN): >60 ML/MIN/1.73 M^2
EST. GFR  (NON AFRICAN AMERICAN): >60 ML/MIN/1.73 M^2
GLUCOSE SERPL-MCNC: 98 MG/DL
HCT VFR BLD AUTO: 38.2 %
HDLC SERPL-MCNC: 60 MG/DL
HDLC SERPL: 24.6 %
HGB BLD-MCNC: 12.8 G/DL
LDLC SERPL CALC-MCNC: 117.8 MG/DL
LYMPHOCYTES # BLD AUTO: 1.5 K/UL
LYMPHOCYTES NFR BLD: 30.3 %
MCH RBC QN AUTO: 31.1 PG
MCHC RBC AUTO-ENTMCNC: 33.5 G/DL
MCV RBC AUTO: 93 FL
MONOCYTES # BLD AUTO: 0.3 K/UL
MONOCYTES NFR BLD: 6.8 %
NEUTROPHILS # BLD AUTO: 2.9 K/UL
NEUTROPHILS NFR BLD: 58.9 %
NONHDLC SERPL-MCNC: 184 MG/DL
PLATELET # BLD AUTO: 290 K/UL
PMV BLD AUTO: 9.3 FL
POTASSIUM SERPL-SCNC: 4.5 MMOL/L
PROT SERPL-MCNC: 7.2 G/DL
RBC # BLD AUTO: 4.12 M/UL
SODIUM SERPL-SCNC: 140 MMOL/L
TRIGL SERPL-MCNC: 331 MG/DL
TSH SERPL DL<=0.005 MIU/L-ACNC: 1.74 UIU/ML
URATE SERPL-MCNC: 3.5 MG/DL
WBC # BLD AUTO: 4.98 K/UL

## 2018-09-26 PROCEDURE — 84443 ASSAY THYROID STIM HORMONE: CPT

## 2018-09-26 PROCEDURE — 3079F DIAST BP 80-89 MM HG: CPT | Mod: CPTII,S$GLB,, | Performed by: INTERNAL MEDICINE

## 2018-09-26 PROCEDURE — 36415 COLL VENOUS BLD VENIPUNCTURE: CPT

## 2018-09-26 PROCEDURE — 80053 COMPREHEN METABOLIC PANEL: CPT

## 2018-09-26 PROCEDURE — 84550 ASSAY OF BLOOD/URIC ACID: CPT

## 2018-09-26 PROCEDURE — 85025 COMPLETE CBC W/AUTO DIFF WBC: CPT

## 2018-09-26 PROCEDURE — 99999 PR PBB SHADOW E&M-EST. PATIENT-LVL III: CPT | Mod: PBBFAC,,, | Performed by: INTERNAL MEDICINE

## 2018-09-26 PROCEDURE — 82306 VITAMIN D 25 HYDROXY: CPT

## 2018-09-26 PROCEDURE — 80061 LIPID PANEL: CPT

## 2018-09-26 PROCEDURE — 99396 PREV VISIT EST AGE 40-64: CPT | Mod: S$GLB,,, | Performed by: INTERNAL MEDICINE

## 2018-09-26 PROCEDURE — 3074F SYST BP LT 130 MM HG: CPT | Mod: CPTII,S$GLB,, | Performed by: INTERNAL MEDICINE

## 2018-09-26 RX ORDER — AZELASTINE 1 MG/ML
1 SPRAY, METERED NASAL 2 TIMES DAILY
Qty: 30 ML | Refills: 11 | Status: SHIPPED | OUTPATIENT
Start: 2018-09-26 | End: 2021-05-11

## 2018-09-26 RX ORDER — FLUTICASONE PROPIONATE 50 MCG
2 SPRAY, SUSPENSION (ML) NASAL DAILY
Qty: 16 G | Refills: 6 | Status: SHIPPED | OUTPATIENT
Start: 2018-09-26 | End: 2019-11-13 | Stop reason: SDUPTHER

## 2018-09-26 NOTE — PROGRESS NOTES
"Subjective:   Patient ID: Lashae Colvin is a 48 y.o. female  Chief complaint:   Chief Complaint   Patient presents with    Annual Exam       HPI      Going to get labs done today   Followed by rheum for gout - taking allopurinal 300mg once daily   Htn: controlled on lisiopril   Gout: jenn allopurinol - due for UA level - no flares since starting med at current dose     Nightmare disorder: Taking prazosin and followed by sleep     Has chronic mild intermittent lower back pain improved with inc exercise    Has been Exercising and stretching which are helpful at times - swimming and water aerobics   Walking/exercising more since starting allopurinol and gout controlled    Concerned about hypothyroidism - has noticed more cold intol, hair loss, wt gain over past 6 months     Derm: [facility] - she has f/u soon     Review of Systems    Objective:  Vitals:    09/26/18 1216   BP: 124/82   BP Location: Left arm   Patient Position: Sitting   BP Method: Large (Manual)   Pulse: 78   SpO2: 100%   Weight: 79 kg (174 lb 2.6 oz)   Height: 5' 6" (1.676 m)     Body mass index is 28.11 kg/m².    Physical Exam   Constitutional: She is oriented to person, place, and time. She appears well-developed and well-nourished.   HENT:   Head: Normocephalic and atraumatic.   Right Ear: External ear normal.   Left Ear: External ear normal.   Nose: Nose normal.   Mouth/Throat: Oropharynx is clear and moist. No oropharyngeal exudate.   No carotid bruits   Eyes: Conjunctivae and EOM are normal.   Neck: Neck supple. No thyromegaly present.   Cardiovascular: Normal rate, regular rhythm, normal heart sounds and intact distal pulses.   Pulmonary/Chest: Effort normal and breath sounds normal.   Abdominal: Soft. Bowel sounds are normal.   Musculoskeletal: She exhibits no edema or tenderness.   Lymphadenopathy:     She has no cervical adenopathy.   Neurological: She is alert and oriented to person, place, and time.   Skin: Skin is warm and dry. "   Psychiatric: Her behavior is normal. Thought content normal.   Vitals reviewed.      Assessment:  1. Annual physical exam    2. Essential hypertension    3. Non-seasonal allergic rhinitis, unspecified trigger    4. Gout, unspecified cause, unspecified chronicity, unspecified site    5. Hyperlipidemia, unspecified hyperlipidemia type        Plan:  Lashae was seen today for annual exam.    Diagnoses and all orders for this visit:    Annual physical exam  -     Vitamin D; Future  Annual labs scheduled for today - add vit d level   Recommend daily sunscreen, cardiovascular exercise min 30 min 5 days per week. Seatbelts routinely.    Essential hypertension  Controlled, cont med     Non-seasonal allergic rhinitis, unspecified trigger  rec nasal saline rinses bid and gently blow nose followed by flonase  otc anti- histamine  Add astelin if needed     Gout, unspecified cause, unspecified chronicity, unspecified site  Controlled, cont med and check ua level     Hyperlipidemia, unspecified hyperlipidemia type  I recommend regular exercise along with a diet low in fat and cholesterol and high in fiber and fresh fruits and vegetables.     Other orders  -     fluticasone (FLONASE) 50 mcg/actuation nasal spray; 2 sprays (100 mcg total) by Each Nare route once daily.  -     azelastine (ASTELIN) 137 mcg (0.1 %) nasal spray; 1 spray (137 mcg total) by Nasal route 2 (two) times daily.    Health Maintenance   Topic Date Due    Influenza Vaccine  08/01/2018    Mammogram  07/02/2019    Pap Smear with HPV Cotest  08/01/2019    TETANUS VACCINE  01/01/2020    Lipid Panel  01/20/2021

## 2018-09-27 ENCOUNTER — TELEPHONE (OUTPATIENT)
Dept: INTERNAL MEDICINE | Facility: CLINIC | Age: 48
End: 2018-09-27

## 2018-09-27 DIAGNOSIS — E55.9 VITAMIN D DEFICIENCY: Primary | ICD-10-CM

## 2018-09-27 RX ORDER — ERGOCALCIFEROL 1.25 MG/1
50000 CAPSULE ORAL
Qty: 12 CAPSULE | Refills: 0 | Status: SHIPPED | OUTPATIENT
Start: 2018-09-27 | End: 2018-10-30 | Stop reason: SDUPTHER

## 2018-09-27 RX ORDER — ACETAMINOPHEN 500 MG
1 TABLET ORAL DAILY
COMMUNITY
Start: 2018-09-27 | End: 2019-04-17

## 2018-09-27 NOTE — TELEPHONE ENCOUNTER
Message sent to pt via my chart with lab results and updates to plan.     Please schedule vit d level in 3 months

## 2018-10-01 DIAGNOSIS — I10 HYPERTENSION, BENIGN: ICD-10-CM

## 2018-10-01 RX ORDER — LISINOPRIL 10 MG/1
TABLET ORAL
Qty: 90 TABLET | Refills: 3 | Status: SHIPPED | OUTPATIENT
Start: 2018-10-01 | End: 2019-01-25 | Stop reason: SDUPTHER

## 2018-10-30 DIAGNOSIS — E55.9 VITAMIN D DEFICIENCY: ICD-10-CM

## 2018-10-30 RX ORDER — ERGOCALCIFEROL 1.25 MG/1
50000 CAPSULE ORAL
Qty: 12 CAPSULE | Refills: 0 | Status: SHIPPED | OUTPATIENT
Start: 2018-10-30 | End: 2019-04-17

## 2018-12-31 RX ORDER — PRAZOSIN HYDROCHLORIDE 2 MG/1
CAPSULE ORAL
Qty: 60 CAPSULE | Refills: 0 | Status: SHIPPED | OUTPATIENT
Start: 2018-12-31 | End: 2019-01-25 | Stop reason: SDUPTHER

## 2019-01-25 DIAGNOSIS — I10 HYPERTENSION, BENIGN: ICD-10-CM

## 2019-01-25 RX ORDER — LISINOPRIL 10 MG/1
TABLET ORAL
Qty: 90 TABLET | Refills: 3 | Status: SHIPPED | OUTPATIENT
Start: 2019-01-25 | End: 2019-11-13

## 2019-01-27 RX ORDER — PRAZOSIN HYDROCHLORIDE 2 MG/1
CAPSULE ORAL
Qty: 60 CAPSULE | Refills: 0 | Status: SHIPPED | OUTPATIENT
Start: 2019-01-27 | End: 2019-05-29

## 2019-02-13 ENCOUNTER — TELEPHONE (OUTPATIENT)
Dept: SLEEP MEDICINE | Facility: CLINIC | Age: 49
End: 2019-02-13

## 2019-02-13 RX ORDER — PRAZOSIN HYDROCHLORIDE 2 MG/1
CAPSULE ORAL
Qty: 60 CAPSULE | Refills: 0 | Status: SHIPPED | OUTPATIENT
Start: 2019-02-13 | End: 2019-04-18 | Stop reason: SDUPTHER

## 2019-02-13 NOTE — TELEPHONE ENCOUNTER
----- Message from Ana Cristina Bang NP sent at 2/13/2019  2:56 PM CST -----  Regarding: f/u appt  Let know refilled 30d supply prazosin, call schedule f/u appt, last seen 10/2016. Thx.

## 2019-03-01 ENCOUNTER — TELEPHONE (OUTPATIENT)
Dept: OPTOMETRY | Facility: CLINIC | Age: 49
End: 2019-03-01

## 2019-03-01 NOTE — TELEPHONE ENCOUNTER
Received PA for xiidra from [facility], ph#-[phone]/ PA#-[id number]- good til 3/1/21

## 2019-03-12 DIAGNOSIS — E55.9 VITAMIN D DEFICIENCY: ICD-10-CM

## 2019-03-12 RX ORDER — ERGOCALCIFEROL 1.25 MG/1
CAPSULE ORAL
Qty: 4 CAPSULE | Refills: 0 | OUTPATIENT
Start: 2019-03-12

## 2019-03-12 NOTE — TELEPHONE ENCOUNTER
Sent message informing pt that the refill request for vit d was refused.Pt overdue for vit d level to be checked   rec start over the counter vitamin D3 2000u daily   - rec hold on refill of prescription dose until her level is checked      - please arrange vit d level asking day and time to do vit d lab

## 2019-03-12 NOTE — TELEPHONE ENCOUNTER
Pt overdue for vit d level to be checked   rec start over the counter vitamin D3 2000u daily   - rec hold on refill of prescription dose until her level is checked     - please arrange vit d level

## 2019-04-17 ENCOUNTER — OFFICE VISIT (OUTPATIENT)
Dept: OPTOMETRY | Facility: CLINIC | Age: 49
End: 2019-04-17
Payer: COMMERCIAL

## 2019-04-17 DIAGNOSIS — H52.13 HIGH MYOPIA, BOTH EYES: ICD-10-CM

## 2019-04-17 DIAGNOSIS — H43.813 POSTERIOR VITREOUS DETACHMENT OF BOTH EYES: ICD-10-CM

## 2019-04-17 DIAGNOSIS — Z46.0 ENCOUNTER FOR FITTING OR ADJUSTMENT OF SPECTACLES OR CONTACT LENSES: Primary | ICD-10-CM

## 2019-04-17 DIAGNOSIS — H04.123 DRY EYES, BILATERAL: ICD-10-CM

## 2019-04-17 DIAGNOSIS — H52.4 PRESBYOPIA OF BOTH EYES: ICD-10-CM

## 2019-04-17 DIAGNOSIS — H35.413 LATTICE DEGENERATION, BOTH EYES: Primary | ICD-10-CM

## 2019-04-17 DIAGNOSIS — H52.223 REGULAR ASTIGMATISM OF BOTH EYES: ICD-10-CM

## 2019-04-17 PROCEDURE — 99499 UNLISTED E&M SERVICE: CPT | Mod: S$GLB,,, | Performed by: OPTOMETRIST

## 2019-04-17 PROCEDURE — 92014 PR EYE EXAM, EST PATIENT,COMPREHESV: ICD-10-PCS | Mod: S$GLB,,, | Performed by: OPTOMETRIST

## 2019-04-17 PROCEDURE — 92015 PR REFRACTION: ICD-10-PCS | Mod: S$GLB,,, | Performed by: OPTOMETRIST

## 2019-04-17 PROCEDURE — 99999 PR PBB SHADOW E&M-EST. PATIENT-LVL III: CPT | Mod: PBBFAC,,, | Performed by: OPTOMETRIST

## 2019-04-17 PROCEDURE — 92310 CONTACT LENS FITTING OU: CPT | Mod: ,,, | Performed by: OPTOMETRIST

## 2019-04-17 PROCEDURE — 99499 NO LOS: ICD-10-PCS | Mod: S$GLB,,, | Performed by: OPTOMETRIST

## 2019-04-17 PROCEDURE — 92015 DETERMINE REFRACTIVE STATE: CPT | Mod: S$GLB,,, | Performed by: OPTOMETRIST

## 2019-04-17 PROCEDURE — 92014 COMPRE OPH EXAM EST PT 1/>: CPT | Mod: S$GLB,,, | Performed by: OPTOMETRIST

## 2019-04-17 PROCEDURE — 99999 PR PBB SHADOW E&M-EST. PATIENT-LVL III: ICD-10-PCS | Mod: PBBFAC,,, | Performed by: OPTOMETRIST

## 2019-04-17 PROCEDURE — 92310 PR CONTACT LENS FITTING (NO CHANGE): ICD-10-PCS | Mod: ,,, | Performed by: OPTOMETRIST

## 2019-04-17 NOTE — PATIENT INSTRUCTIONS
Prior diagnosis of bilateral dry eye.  Ms. Colvin had used Restasis ophthalmic emulsion in the past but with little apparent benefit, so has discontinue use.  Previously suggested trial with Xiidra ophthalmic solution, but Rx never filled secondary to concerns about cost of medication..      Thus, suggest use ocular lubricating drops, or CL rewetting drops with CLs in, as often as needed throughout the day.     High myopia with astigmatism in each eye.  Stable refractive error in each eye.  Early presbyopia consistent with age.  New spectacle lens Rx issued for use in lieu of CLs.     Wears SCLs. Good lens fit in each eye with Biofinity Toric SCLs.  Power of each CL okay as is.   New (duplicate) contact lens Rx issued.  Suggest use of +1.50 or +1.75 OTC reading glasses over CLs as needed.     Prior diagnosis of peripheral lattice retinal degeneration.   S/P laser treatment to peripheral retina in the left eye.    Appears stable      Follow up with Dr. Steward in retina clinic as he recommends.      Repeat general eye examination and refraction, and contact lens follow up, in one year.

## 2019-04-17 NOTE — PROGRESS NOTES
HPI     Contact Lens Follow Up      Additional comments: Patient in today for contact lens follow-up with   general eye examination.  Refer to additional patient encounter notes   dated 04/17/2019.                Comments     Patient in today for contact lens follow-up with general eye examination.    Refer to additional patient encounter notes dated 04/17/2019.            Last edited by Slim Paul, OD on 4/17/2019  4:15 PM. (History)            Assessment /Plan     For exam results, see Encounter Report.    1. Encounter for fitting or adjustment of spectacles or contact lenses                 Prior diagnosis of bilateral dry eye.  Ms. Colvin had used Restasis ophthalmic emulsion in the past but with little apparent benefit, so has discontinue use.  Previously suggested trial with Xiidra ophthalmic solution, but Rx never filled secondary to concerns about cost of medication..      Thus, suggest use ocular lubricating drops, or CL rewetting drops with CLs in, as often as needed throughout the day.     High myopia with astigmatism in each eye.  Stable refractive error in each eye.  Early presbyopia consistent with age.  New spectacle lens Rx issued for use in lieu of CLs.     Wears SCLs. Good lens fit in each eye with Biofinity Toric SCLs.  Power of each CL okay as is.   New (duplicate) contact lens Rx issued.  Suggest use of +1.50 or +1.75 OTC reading glasses over CLs as needed.     Prior diagnosis of peripheral lattice retinal degeneration.   S/P laser treatment to peripheral retina in the left eye.    Appears stable      Follow up with Dr. Steward in retina clinic as he recommends.      Repeat general eye examination and refraction, and contact lens follow up, in one year.

## 2019-04-17 NOTE — PROGRESS NOTES
HPI     Concerns About Ocular Health      Additional comments: General eye exam and refraction, and contact lens   follow-up.  Still bothered by floaters in both eyes.               Comments     Patient's age: 49 y.o. [place]  Occupation: [employer]  Approximate date of last eye examination:  04/23/2018  Name of last eye doctor seen:   City/State: [facility]  Wears glasses? Yes      If yes, wears  Full-time or part-time?  Part time   Present glasses are: Bifocal, SV Distance, SV Reading?  SV lens   Approximate age of present glasses:  2 + yrs    Got new glasses following last exam, or subsequently?:  No    Any problem with [facility] with glasses?  No   Wears CLs?:  New trial Biofinity Toric SCLs:                                                             OD  8.7   14.5     -7.50 -1.25 x 040                                                             OS  8.7   14.5      -8.50 -1.25 x 140               Wears full-time or part-time:  Full time                Sleeps with contact lenses:  No                CL Solution used:  Chhaya/Boharrietue                How often replace CLs:  Monthly               Any problem with [facility] with CLs?  No                 Headaches?  No   Eye pain/discomfort?  No                                                                                      Flashes?  No   Floaters?  Yes, no changes   Diplopia/Double vision?  No   Patient's Ocular History:         Any eye surgeries? No          Any eye injury?  No          Any treatment for eye disease?  No   Family history of eye disease?  No   Significant patient medical history:         1. Diabetes?  No        If yes, IDDM or NIDDM?    2. HBP?  Yes, controlled with medication               3. Other (describe):  None    ! OTC eyedrops currently using:  Blinx rewetting solution prn OU   ! Prescription eye meds currently using:  No    ! Any history of allergy/adverse reaction to any eye meds used   previously?  No     ! Any history  "of allergy/adverse reaction to eyedrops used during prior   eye exam(s)? Yes, pt noticed OU would dilate if she uses Clear Eyes.    ! Any history of allergy/adverse reaction to Novacaine or similar meds?   No    ! Any history of allergy/adverse reaction to Epinephrine or similar meds?   No     ! Patient okay with use of anesthetic eyedrops to check eye pressure?    Yes         ! Patient okay with use of eyedrops to dilate pupils today?  Yes    !  Allergies/Medications/Medical History/Family History reviewed today?    Yes       PD =   68/64  Desired reading distance =  16.25"                                                                      Last edited by Slim Paul, OD on 4/17/2019  4:23 PM. (History)            Assessment /Plan     For exam results, see Encounter Report.    1. Lattice degeneration, both eyes     2. Posterior vitreous detachment of both eyes     3. Dry eyes, bilateral     4. High myopia, both eyes     5. Regular astigmatism of both eyes     6. Presbyopia of both eyes                  Prior diagnosis of bilateral dry eye.  Ms. Colvin had used Restasis ophthalmic emulsion in the past but with little apparent benefit, so has discontinued use.  Previously suggested trial with Xiidra ophthalmic solution, but Rx never filled secondary to concerns about cost of medication..      Thus, suggest use ocular lubricating drops, or CL rewetting drops with CLs in, as often as needed throughout the day.     High myopia with astigmatism in each eye.  Stable refractive error in each eye.  Early presbyopia consistent with age.  New spectacle lens Rx issued for use in lieu of CLs.     Wears SCLs. Good lens fit in each eye with Biofinity Toric SCLs.  Power of each CL okay as is.   New (duplicate) contact lens Rx issued.  Suggest use of +1.50 or +1.75 OTC reading glasses over CLs as needed.     Prior diagnosis of peripheral lattice retinal degeneration.   S/P laser treatment to peripheral retina in the " left eye.    Appears stable      Follow up with Dr. Steward in retina clinic as he recommends.      Repeat general eye examination and refraction, and contact lens follow up, in one year.

## 2019-04-17 NOTE — PATIENT INSTRUCTIONS
Prior diagnosis of bilateral dry eye.  Ms. Colvin had used Restasis ophthalmic emulsion in the past but with little apparent benefit, so has discontinued use.  Previously suggested trial with Xiidra ophthalmic solution, but Rx never filled secondary to concerns about cost of medication..      Thus, suggest use ocular lubricating drops, or CL rewetting drops with CLs in, as often as needed throughout the day.     High myopia with astigmatism in each eye.  Stable refractive error in each eye.  Early presbyopia consistent with age.  New spectacle lens Rx issued for use in lieu of CLs.     Wears SCLs. Good lens fit in each eye with Biofinity Toric SCLs.  Power of each CL okay as is.   New (duplicate) contact lens Rx issued.  Suggest use of +1.50 or +1.75 OTC reading glasses over CLs as needed.     Prior diagnosis of peripheral lattice retinal degeneration.   S/P laser treatment to peripheral retina in the left eye.    Appears stable      Follow up with Dr. Steward in retina clinic as he recommends.      Repeat general eye examination and refraction, and contact lens follow up, in one year.

## 2019-04-18 ENCOUNTER — TELEPHONE (OUTPATIENT)
Dept: SLEEP MEDICINE | Facility: CLINIC | Age: 49
End: 2019-04-18

## 2019-04-18 DIAGNOSIS — E55.9 VITAMIN D DEFICIENCY: ICD-10-CM

## 2019-04-18 RX ORDER — PRAZOSIN HYDROCHLORIDE 2 MG/1
CAPSULE ORAL
Qty: 60 CAPSULE | Refills: 0 | Status: SHIPPED | OUTPATIENT
Start: 2019-04-18 | End: 2019-05-29

## 2019-04-18 RX ORDER — ERGOCALCIFEROL 1.25 MG/1
CAPSULE ORAL
Qty: 4 CAPSULE | Refills: 0 | OUTPATIENT
Start: 2019-04-18

## 2019-04-18 NOTE — TELEPHONE ENCOUNTER
Sent message to inform pt of Dr. Abreu's recommendation:  Needs to schedule vit d level   - rec start otc vit d 2000u in place of rx until level returns. Asking for  Day and time to schedule

## 2019-04-18 NOTE — TELEPHONE ENCOUNTER
Spoke w/ pt she stated that she is going to hold off on lab for now b/c she has the high deductible plan w/ her insurance and she is looking to see if there is a cheaper external lab first so she can complete lab .  Pt will contact office if she finds a labs and need orders put in external

## 2019-05-01 DIAGNOSIS — M10.9 GOUT, UNSPECIFIED CAUSE, UNSPECIFIED CHRONICITY, UNSPECIFIED SITE: ICD-10-CM

## 2019-05-01 RX ORDER — ALLOPURINOL 300 MG/1
300 TABLET ORAL DAILY
Qty: 90 TABLET | Refills: 3 | Status: SHIPPED | OUTPATIENT
Start: 2019-05-01 | End: 2020-04-21 | Stop reason: SDUPTHER

## 2019-05-02 ENCOUNTER — PATIENT MESSAGE (OUTPATIENT)
Dept: INTERNAL MEDICINE | Facility: CLINIC | Age: 49
End: 2019-05-02

## 2019-05-28 RX ORDER — PRAZOSIN HYDROCHLORIDE 2 MG/1
CAPSULE ORAL
Qty: 60 CAPSULE | Refills: 0 | OUTPATIENT
Start: 2019-05-28

## 2019-05-29 RX ORDER — PRAZOSIN HYDROCHLORIDE 2 MG/1
CAPSULE ORAL
Qty: 60 CAPSULE | Refills: 0 | Status: SHIPPED | OUTPATIENT
Start: 2019-05-29 | End: 2019-07-17 | Stop reason: SDUPTHER

## 2019-06-17 ENCOUNTER — OFFICE VISIT (OUTPATIENT)
Dept: OBSTETRICS AND GYNECOLOGY | Facility: CLINIC | Age: 49
End: 2019-06-17
Attending: OBSTETRICS & GYNECOLOGY
Payer: COMMERCIAL

## 2019-06-17 VITALS
BODY MASS INDEX: 27.63 KG/M2 | WEIGHT: 171.94 LBS | SYSTOLIC BLOOD PRESSURE: 126 MMHG | HEIGHT: 66 IN | DIASTOLIC BLOOD PRESSURE: 76 MMHG

## 2019-06-17 DIAGNOSIS — Z01.419 WELL WOMAN EXAM WITH ROUTINE GYNECOLOGICAL EXAM: Primary | ICD-10-CM

## 2019-06-17 DIAGNOSIS — Z12.31 VISIT FOR SCREENING MAMMOGRAM: ICD-10-CM

## 2019-06-17 PROBLEM — J30.89 NON-SEASONAL ALLERGIC RHINITIS: Status: RESOLVED | Noted: 2018-09-26 | Resolved: 2019-06-17

## 2019-06-17 PROCEDURE — 3078F DIAST BP <80 MM HG: CPT | Mod: CPTII,S$GLB,, | Performed by: OBSTETRICS & GYNECOLOGY

## 2019-06-17 PROCEDURE — 3074F PR MOST RECENT SYSTOLIC BLOOD PRESSURE < 130 MM HG: ICD-10-PCS | Mod: CPTII,S$GLB,, | Performed by: OBSTETRICS & GYNECOLOGY

## 2019-06-17 PROCEDURE — 3078F PR MOST RECENT DIASTOLIC BLOOD PRESSURE < 80 MM HG: ICD-10-PCS | Mod: CPTII,S$GLB,, | Performed by: OBSTETRICS & GYNECOLOGY

## 2019-06-17 PROCEDURE — 99396 PR PREVENTIVE VISIT,EST,40-64: ICD-10-PCS | Mod: S$GLB,,, | Performed by: OBSTETRICS & GYNECOLOGY

## 2019-06-17 PROCEDURE — 3074F SYST BP LT 130 MM HG: CPT | Mod: CPTII,S$GLB,, | Performed by: OBSTETRICS & GYNECOLOGY

## 2019-06-17 PROCEDURE — 88141 LIQUID-BASED PAP SMEAR, SCREENING: ICD-10-PCS | Mod: ,,, | Performed by: PATHOLOGY

## 2019-06-17 PROCEDURE — 99396 PREV VISIT EST AGE 40-64: CPT | Mod: S$GLB,,, | Performed by: OBSTETRICS & GYNECOLOGY

## 2019-06-17 PROCEDURE — 99999 PR PBB SHADOW E&M-EST. PATIENT-LVL III: ICD-10-PCS | Mod: PBBFAC,,, | Performed by: OBSTETRICS & GYNECOLOGY

## 2019-06-17 PROCEDURE — 88141 CYTOPATH C/V INTERPRET: CPT | Mod: ,,, | Performed by: PATHOLOGY

## 2019-06-17 PROCEDURE — 87624 HPV HI-RISK TYP POOLED RSLT: CPT

## 2019-06-17 PROCEDURE — 88175 CYTOPATH C/V AUTO FLUID REDO: CPT | Performed by: PATHOLOGY

## 2019-06-17 PROCEDURE — 99999 PR PBB SHADOW E&M-EST. PATIENT-LVL III: CPT | Mod: PBBFAC,,, | Performed by: OBSTETRICS & GYNECOLOGY

## 2019-06-17 RX ORDER — ACETAMINOPHEN AND CODEINE PHOSPHATE 120; 12 MG/5ML; MG/5ML
1 SOLUTION ORAL DAILY
Qty: 30 TABLET | Refills: 12 | Status: SHIPPED | OUTPATIENT
Start: 2019-06-17 | End: 2020-06-24

## 2019-06-17 NOTE — PROGRESS NOTES
SUBJECTIVE:   49 y.o. female   for annual routine Pap and checkup. Patient's last menstrual period was 2019 (exact date)..  She complains of missed cycle in April.  She would like birth control.        Past Medical History:   Diagnosis Date    Arthritis     cervical spine DJD    Cancer     CIS of cervis s/p conization    Headache(784.0)     Hyperlipidemia     Hypertension     PTSD (post-traumatic stress disorder)      Past Surgical History:   Procedure Laterality Date    conization      for cervical CA in     HAND SURGERY      R hand, repair of a severed tendon    TONSILLECTOMY       Social History     Socioeconomic History    Marital status: Significant Other     Spouse name: Not on file    Number of children: Not on file    Years of education: Not on file    Highest education level: Not on file   Occupational History    Occupation:      Employer: [employer]   Social Needs    Financial resource strain: Not on file    Food insecurity:     Worry: Not on file     Inability: Not on file    Transportation needs:     Medical: Not on file     Non-medical: Not on file   Tobacco Use    Smoking status: Former Smoker     Last attempt to quit: 2001     Years since quittin.1    Smokeless tobacco: Never Used   Substance and Sexual Activity    Alcohol use: Yes     Alcohol/week: 3.0 oz     Types: 5 Glasses of wine per week    Drug use: No    Sexual activity: Yes     Partners: Male     Birth control/protection: Sponge, Spermicide   Lifestyle    Physical activity:     Days per week: Not on file     Minutes per session: Not on file    Stress: Not on file   Relationships    Social connections:     Talks on phone: Not on file     Gets together: Not on file     Attends Evangelical service: Not on file     Active member of club or organization: Not on file     Attends meetings of clubs or organizations: Not on file     Relationship status: Not on file    Other Topics Concern    Not on file   Social History Narrative    Lives w/boyfriend    Originally from [place]    Moved to [facility] in      Family History   Problem Relation Age of Onset    Cancer Mother         spinal cord tumor    Hypertension Mother     Arthritis Mother     Diabetes Brother     HIV Brother     Macular degeneration Other     Hypertension Father     Cataracts Father     Diabetes Maternal Grandfather     Heart disease Paternal Grandfather     Heart attack Paternal Grandfather     Gout Paternal Grandfather     Amblyopia Neg Hx     Blindness Neg Hx     Glaucoma Neg Hx     Retinal detachment Neg Hx     Strabismus Neg Hx     Stroke Neg Hx     Thyroid disease Neg Hx     Ovarian cancer Neg Hx      OB History    Para Term  AB Living   1   0   1     SAB TAB Ectopic Multiple Live Births                  # Outcome Date GA Lbr Neftaly/2nd Weight Sex Delivery Anes PTL Lv   1 AB  8w0d                Current Outpatient Medications   Medication Sig Dispense Refill    allopurinol (ZYLOPRIM) 300 MG tablet Take 1 tablet (300 mg total) by mouth once daily. 90 tablet 3    azelastine (ASTELIN) 137 mcg (0.1 %) nasal spray 1 spray (137 mcg total) by Nasal route 2 (two) times daily. 30 mL 11    calcium carbonate/vitamin D3 (CALCIUM 500 + D, D3, ORAL) Take by mouth.      fluticasone (FLONASE) 50 mcg/actuation nasal spray 2 sprays (100 mcg total) by Each Nare route once daily. 16 g 6    lisinopril 10 MG tablet TAKE 1 TABLET BY MOUTH EVERY DAY 90 tablet 3    prazosin (MINIPRESS) 2 MG Cap TAKE 2 CAPSULES BY MOUTH EVERY EVENING 60 capsule 0    albuterol 90 mcg/actuation inhaler Inhale 2 puffs into the lungs every 6 (six) hours as needed for Wheezing. 18 g 11    norethindrone (MICRONOR) 0.35 mg tablet Take 1 tablet (0.35 mg total) by mouth once daily. 30 tablet 12    omega-3 fatty acids-vitamin E (FISH OIL) 1,000 mg Cap Take 2,000 mg by mouth 2 (two) times daily.  0    ranitidine  "(ZANTAC) 75 MG tablet Take 1 tablet (75 mg total) by mouth 2 (two) times daily. 60 tablet 0     No current facility-administered medications for this visit.      Allergies: Patient has no known allergies.     ROS:  Constitutional: no weight loss, weight gain, fever, fatigue  Eyes:  No vision changes, glasses/contacts  ENT/Mouth: No ulcers, sinus problems, ears ringing, headache  Cardiovascular: No inability to lie flat, chest pain, exercise intolerance, swelling, heart palpitations  Respiratory: No wheezing, coughing blood, shortness of breath, or cough  Gastrointestinal: No diarrhea, bloody stool, nausea/vomiting, constipation, gas, hemorrhoids  Genitourinary: No blood in urine, painful urination, urgency of urination, frequency of urination, incomplete emptying, incontinence, abnormal bleeding, painful periods, heavy periods, vaginal discharge, vaginal odor, painful intercourse, sexual problems, bleeding after intercourse.  Musculoskeletal: No muscle weakness  Skin/Breast: No painful breasts, nipple discharge, masses, rash, ulcers  Neurological: No passing out, seizures, numbness, headache  Endocrine: No diabetes, hypothyroid, hyperthyroid, hot flashes, hair loss, abnormal hair growth, ance  Psychiatric: No depression, crying  Hematologic: No bruises, bleeding, swollen lymph nodes, anemia.      OBJECTIVE:   The patient appears well, alert, oriented x 3, in no distress.  /76 (BP Location: Right arm, Patient Position: Sitting, BP Method: Small (Manual))   Ht 5' 6" (1.676 m)   Wt 78 kg (171 lb 15.3 oz)   LMP 05/26/2019 (Exact Date)   BMI 27.75 kg/m²   NECK: no thyromegaly, trachea midline  SKIN: no acne, striae, hirsutism  BREAST EXAM: breasts appear normal, no suspicious masses, no skin or nipple changes or axillary nodes  ABDOMEN: no hernias, masses, or hepatosplenomegaly  GENITALIA: normal external genitalia, no erythema, no discharge  URETHRA: normal urethra, normal urethral meatus  VAGINA: " Normal  CERVIX: no lesions or cervical motion tenderness  UTERUS: normal size, contour, position, consistency, mobility, non-tender  ADNEXA: normal adnexa and no mass, fullness, tenderness    \  ASSESSMENT:   .Lashae was seen today for well woman.    Diagnoses and all orders for this visit:    Well woman exam with routine gynecological exam  -     Liquid-based pap smear, screening  -     HPV High Risk Genotypes, PCR    Visit for screening mammogram  -     Mammo Digital Screening Bilat w/ Prakash; Future    Other orders  -     norethindrone (MICRONOR) 0.35 mg tablet; Take 1 tablet (0.35 mg total) by mouth once daily.

## 2019-06-21 LAB
HPV HR 12 DNA CVX QL NAA+PROBE: NEGATIVE
HPV16 AG SPEC QL: NEGATIVE
HPV18 DNA SPEC QL NAA+PROBE: NEGATIVE

## 2019-07-17 RX ORDER — PRAZOSIN HYDROCHLORIDE 2 MG/1
CAPSULE ORAL
Qty: 60 CAPSULE | Refills: 0 | Status: SHIPPED | OUTPATIENT
Start: 2019-07-17 | End: 2019-08-07 | Stop reason: SDUPTHER

## 2019-07-17 NOTE — TELEPHONE ENCOUNTER
Sent message to inform pt that script was sent to the pharmacy - you are  overdue for f/u with sleep clinic - recommend she schedule appt

## 2019-08-07 RX ORDER — PRAZOSIN HYDROCHLORIDE 2 MG/1
CAPSULE ORAL
Qty: 60 CAPSULE | Refills: 0 | Status: SHIPPED | OUTPATIENT
Start: 2019-08-07 | End: 2019-08-21 | Stop reason: SDUPTHER

## 2019-08-14 ENCOUNTER — TELEPHONE (OUTPATIENT)
Dept: SLEEP MEDICINE | Facility: CLINIC | Age: 49
End: 2019-08-14

## 2019-08-21 ENCOUNTER — OFFICE VISIT (OUTPATIENT)
Dept: NEUROLOGY | Facility: CLINIC | Age: 49
End: 2019-08-21
Payer: COMMERCIAL

## 2019-08-21 VITALS
HEIGHT: 66 IN | BODY MASS INDEX: 27.92 KG/M2 | DIASTOLIC BLOOD PRESSURE: 85 MMHG | WEIGHT: 173.75 LBS | SYSTOLIC BLOOD PRESSURE: 132 MMHG | HEART RATE: 90 BPM

## 2019-08-21 DIAGNOSIS — F51.5 NIGHTMARE DISORDER: Primary | ICD-10-CM

## 2019-08-21 DIAGNOSIS — I10 ESSENTIAL HYPERTENSION: ICD-10-CM

## 2019-08-21 DIAGNOSIS — M50.30 DDD (DEGENERATIVE DISC DISEASE), CERVICAL: ICD-10-CM

## 2019-08-21 PROCEDURE — 3079F PR MOST RECENT DIASTOLIC BLOOD PRESSURE 80-89 MM HG: ICD-10-PCS | Mod: CPTII,S$GLB,, | Performed by: NURSE PRACTITIONER

## 2019-08-21 PROCEDURE — 3075F SYST BP GE 130 - 139MM HG: CPT | Mod: CPTII,S$GLB,, | Performed by: NURSE PRACTITIONER

## 2019-08-21 PROCEDURE — 99214 PR OFFICE/OUTPT VISIT, EST, LEVL IV, 30-39 MIN: ICD-10-PCS | Mod: S$GLB,,, | Performed by: NURSE PRACTITIONER

## 2019-08-21 PROCEDURE — 99999 PR PBB SHADOW E&M-EST. PATIENT-LVL III: ICD-10-PCS | Mod: PBBFAC,,, | Performed by: NURSE PRACTITIONER

## 2019-08-21 PROCEDURE — 3075F PR MOST RECENT SYSTOLIC BLOOD PRESS GE 130-139MM HG: ICD-10-PCS | Mod: CPTII,S$GLB,, | Performed by: NURSE PRACTITIONER

## 2019-08-21 PROCEDURE — 99999 PR PBB SHADOW E&M-EST. PATIENT-LVL III: CPT | Mod: PBBFAC,,, | Performed by: NURSE PRACTITIONER

## 2019-08-21 PROCEDURE — 3008F BODY MASS INDEX DOCD: CPT | Mod: CPTII,S$GLB,, | Performed by: NURSE PRACTITIONER

## 2019-08-21 PROCEDURE — 3079F DIAST BP 80-89 MM HG: CPT | Mod: CPTII,S$GLB,, | Performed by: NURSE PRACTITIONER

## 2019-08-21 PROCEDURE — 99214 OFFICE O/P EST MOD 30 MIN: CPT | Mod: S$GLB,,, | Performed by: NURSE PRACTITIONER

## 2019-08-21 PROCEDURE — 3008F PR BODY MASS INDEX (BMI) DOCUMENTED: ICD-10-PCS | Mod: CPTII,S$GLB,, | Performed by: NURSE PRACTITIONER

## 2019-08-21 RX ORDER — PRAZOSIN HYDROCHLORIDE 2 MG/1
4 CAPSULE ORAL NIGHTLY
Qty: 60 CAPSULE | Refills: 11 | Status: SHIPPED | OUTPATIENT
Start: 2019-08-21 | End: 2020-09-09
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
## 2019-08-25 ENCOUNTER — PATIENT MESSAGE (OUTPATIENT)
Dept: NEUROLOGY | Facility: CLINIC | Age: 49
End: 2019-08-25

## 2019-08-26 ENCOUNTER — TELEPHONE (OUTPATIENT)
Dept: OPTOMETRY | Facility: CLINIC | Age: 49
End: 2019-08-26

## 2019-08-27 RX ORDER — GABAPENTIN 100 MG/1
300 CAPSULE ORAL NIGHTLY
Qty: 90 CAPSULE | Refills: 3 | Status: SHIPPED | OUTPATIENT
Start: 2019-08-27 | End: 2019-12-03 | Stop reason: SDUPTHER

## 2019-10-30 ENCOUNTER — PATIENT OUTREACH (OUTPATIENT)
Dept: ADMINISTRATIVE | Facility: HOSPITAL | Age: 49
End: 2019-10-30

## 2019-10-30 ENCOUNTER — PATIENT MESSAGE (OUTPATIENT)
Dept: ADMINISTRATIVE | Facility: HOSPITAL | Age: 49
End: 2019-10-30

## 2019-11-13 ENCOUNTER — OFFICE VISIT (OUTPATIENT)
Dept: INTERNAL MEDICINE | Facility: CLINIC | Age: 49
End: 2019-11-13
Attending: INTERNAL MEDICINE
Payer: COMMERCIAL

## 2019-11-13 VITALS
SYSTOLIC BLOOD PRESSURE: 137 MMHG | OXYGEN SATURATION: 99 % | WEIGHT: 178.38 LBS | HEIGHT: 66 IN | DIASTOLIC BLOOD PRESSURE: 86 MMHG | HEART RATE: 75 BPM | BODY MASS INDEX: 28.67 KG/M2

## 2019-11-13 DIAGNOSIS — E78.5 HYPERLIPIDEMIA, UNSPECIFIED HYPERLIPIDEMIA TYPE: ICD-10-CM

## 2019-11-13 DIAGNOSIS — M54.16 CHRONIC RADICULAR PAIN OF LOWER BACK: ICD-10-CM

## 2019-11-13 DIAGNOSIS — N92.6 IRREGULAR PERIODS: ICD-10-CM

## 2019-11-13 DIAGNOSIS — G89.29 CHRONIC RADICULAR PAIN OF LOWER BACK: ICD-10-CM

## 2019-11-13 DIAGNOSIS — Z00.00 ANNUAL PHYSICAL EXAM: Primary | ICD-10-CM

## 2019-11-13 DIAGNOSIS — E61.1 IRON DEFICIENCY: ICD-10-CM

## 2019-11-13 DIAGNOSIS — I10 ESSENTIAL HYPERTENSION: ICD-10-CM

## 2019-11-13 DIAGNOSIS — Z78.9 VEGETARIAN: ICD-10-CM

## 2019-11-13 DIAGNOSIS — M10.00 IDIOPATHIC GOUT, UNSPECIFIED CHRONICITY, UNSPECIFIED SITE: ICD-10-CM

## 2019-11-13 DIAGNOSIS — Z00.00 PREVENTATIVE HEALTH CARE: ICD-10-CM

## 2019-11-13 PROCEDURE — 3075F SYST BP GE 130 - 139MM HG: CPT | Mod: CPTII,S$GLB,, | Performed by: INTERNAL MEDICINE

## 2019-11-13 PROCEDURE — 99999 PR PBB SHADOW E&M-EST. PATIENT-LVL III: CPT | Mod: PBBFAC,,, | Performed by: INTERNAL MEDICINE

## 2019-11-13 PROCEDURE — 99396 PREV VISIT EST AGE 40-64: CPT | Mod: 25,S$GLB,, | Performed by: INTERNAL MEDICINE

## 2019-11-13 PROCEDURE — 99396 PR PREVENTIVE VISIT,EST,40-64: ICD-10-PCS | Mod: 25,S$GLB,, | Performed by: INTERNAL MEDICINE

## 2019-11-13 PROCEDURE — 3075F PR MOST RECENT SYSTOLIC BLOOD PRESS GE 130-139MM HG: ICD-10-PCS | Mod: CPTII,S$GLB,, | Performed by: INTERNAL MEDICINE

## 2019-11-13 PROCEDURE — 3079F PR MOST RECENT DIASTOLIC BLOOD PRESSURE 80-89 MM HG: ICD-10-PCS | Mod: CPTII,S$GLB,, | Performed by: INTERNAL MEDICINE

## 2019-11-13 PROCEDURE — 3079F DIAST BP 80-89 MM HG: CPT | Mod: CPTII,S$GLB,, | Performed by: INTERNAL MEDICINE

## 2019-11-13 PROCEDURE — 99999 PR PBB SHADOW E&M-EST. PATIENT-LVL III: ICD-10-PCS | Mod: PBBFAC,,, | Performed by: INTERNAL MEDICINE

## 2019-11-13 RX ORDER — FLUTICASONE PROPIONATE 50 MCG
2 SPRAY, SUSPENSION (ML) NASAL DAILY
Qty: 48 G | Refills: 3 | Status: SHIPPED | OUTPATIENT
Start: 2019-11-13

## 2019-11-13 RX ORDER — LOSARTAN POTASSIUM 25 MG/1
25 TABLET ORAL DAILY
Qty: 90 TABLET | Refills: 3 | Status: SHIPPED | OUTPATIENT
Start: 2019-11-13 | End: 2019-11-26 | Stop reason: SDUPTHER

## 2019-11-13 NOTE — PROGRESS NOTES
"Subjective:   Patient ID: Lashae Colvin is a 49 y.o. female  Chief complaint:   Chief Complaint   Patient presents with    Annual Exam       HPI     Here for annual exam     Gout:    Previously followed by rheum for gout   - taking allopurinal 300mg once daily and well controlled  - no flares    - UA level was at goal upon last check     HTN:   - controlled on lisiopril - occ cough with no trigger and c/f se of acei  - reviewed switching to arb and agrees     Nightmare disorder: Taking prazosin and followed by sleep - sx stable     Has chronic mild intermittent right > left lower back present for > 2 years   -  pain improved previously with inc exercise and stretching, weights and pilates  - having good and bad days     - radiates down post leg and lateral aspect   - numbness in 2nd digit of foot Bilaterally   - never attended pt for lower back   - no numbness weakness or incontinence   - tried gpn and intermittently helpful      Gyn:  - Periods are intermittent over past year prior to monthly periods   - she is requesting FSH level and suspect perimenopausal   - lmp 10/24    Quit eating meat about 1.5 years ago     Tried donate blood 3 months and was declined due to low iron  Review of Systems    Objective:  Vitals:    11/13/19 1353   BP: 137/86   Pulse: 75   SpO2: 99%   Weight: 80.9 kg (178 lb 5.6 oz)   Height: 5' 6" (1.676 m)     Body mass index is 28.79 kg/m².    Physical Exam   Constitutional: She is oriented to person, place, and time. She appears well-developed and well-nourished.   HENT:   Head: Normocephalic and atraumatic.   Right Ear: External ear normal.   Left Ear: External ear normal.   Nose: Nose normal.   Mouth/Throat: Oropharynx is clear and moist. No oropharyngeal exudate.   No carotid bruits   Eyes: Conjunctivae and EOM are normal.   Neck: Neck supple. No thyromegaly present.   Cardiovascular: Normal rate, regular rhythm, normal heart sounds and intact distal pulses.   Pulmonary/Chest: " Effort normal and breath sounds normal.   Abdominal: Soft. Bowel sounds are normal.   Musculoskeletal: She exhibits no edema or tenderness.   Lymphadenopathy:     She has no cervical adenopathy.   Neurological: She is alert and oriented to person, place, and time.   Skin: Skin is warm and dry.   Psychiatric: Her behavior is normal. Thought content normal.   Vitals reviewed.      Assessment:  1. Annual physical exam    2. Essential hypertension    3. Hyperlipidemia, unspecified hyperlipidemia type    4. Idiopathic gout, unspecified chronicity, unspecified site    5. Chronic radicular pain of lower back    6. Iron deficiency    7. Vegetarian    8. Irregular periods        Plan:  Lashae was seen today for annual exam.    Diagnoses and all orders for this visit:    Annual physical exam  -     Vitamin D; Future  -     Hemoglobin A1c; Future  -     TSH; Future  -     Lipid panel; Future  -     CBC auto differential; Future  -     Comprehensive metabolic panel; Future  -     Uric acid; Future  Recommend daily sunscreen, cardiovascular exercise min 30 min 5 days per week. Seatbelts routinely.    Essential hypertension  Switch acei to arb   rtc in 2 weeks for bp check - if unable to come in for nv for bp check then will check at work and send message with bp readings     Hyperlipidemia, unspecified hyperlipidemia type  Cont diet modification   Mediterranean diet    Idiopathic gout, unspecified chronicity, unspecified site  Cont med   Check uric acid level   Cont diet modification    Chronic radicular pain of lower back  -     X-Ray Lumbar Complete With Flex And Ext; Future  Check xray   PT   Aleve bid prn with food and pepcid prn    Iron deficiency  -     Ferritin; Future  -     Iron and TIBC; Future  Per pt told iron low when tried to donate blood - check labs     Vegetarian  -     Vitamin B12; Future    Irregular periods  -     Follicle stimulating hormone; Future    Other orders  -     losartan (COZAAR) 25 MG tablet;  Take 1 tablet (25 mg total) by mouth once daily.  -     (In Office Administered) Tdap Vaccine    pt opted to check mmg every 2 years - agrees to repeat next year      Health Maintenance   Topic Date Due    Mammogram  07/02/2019    TETANUS VACCINE  01/01/2020    Pap Smear with HPV Cotest  06/17/2022    Lipid Panel  09/26/2023

## 2019-11-14 ENCOUNTER — IMMUNIZATION (OUTPATIENT)
Dept: PHARMACY | Facility: CLINIC | Age: 49
End: 2019-11-14
Payer: COMMERCIAL

## 2019-11-25 ENCOUNTER — PATIENT MESSAGE (OUTPATIENT)
Dept: INTERNAL MEDICINE | Facility: CLINIC | Age: 49
End: 2019-11-25

## 2019-11-26 RX ORDER — LOSARTAN POTASSIUM 50 MG/1
50 TABLET ORAL DAILY
Qty: 90 TABLET | Refills: 3 | Status: SHIPPED | OUTPATIENT
Start: 2019-11-26 | End: 2019-12-02

## 2019-11-27 ENCOUNTER — HOSPITAL ENCOUNTER (OUTPATIENT)
Dept: RADIOLOGY | Facility: OTHER | Age: 49
Discharge: HOME | End: 2019-11-27
Attending: INTERNAL MEDICINE
Payer: COMMERCIAL

## 2019-11-27 DIAGNOSIS — G89.29 CHRONIC RADICULAR PAIN OF LOWER BACK: ICD-10-CM

## 2019-11-27 DIAGNOSIS — M54.16 CHRONIC RADICULAR PAIN OF LOWER BACK: ICD-10-CM

## 2019-11-27 PROCEDURE — 72110 X-RAY EXAM L-2 SPINE 4/>VWS: CPT | Mod: 26,,, | Performed by: RADIOLOGY

## 2019-11-27 PROCEDURE — 72110 XR LUMBAR SPINE 5 VIEW WITH FLEX AND EXT: ICD-10-PCS | Mod: 26,,, | Performed by: RADIOLOGY

## 2019-11-27 PROCEDURE — 72110 X-RAY EXAM L-2 SPINE 4/>VWS: CPT | Mod: TC,FY

## 2019-12-02 ENCOUNTER — PATIENT MESSAGE (OUTPATIENT)
Dept: INTERNAL MEDICINE | Facility: CLINIC | Age: 49
End: 2019-12-02

## 2019-12-02 ENCOUNTER — TELEPHONE (OUTPATIENT)
Dept: INTERNAL MEDICINE | Facility: CLINIC | Age: 49
End: 2019-12-02

## 2019-12-02 DIAGNOSIS — E53.8 B12 DEFICIENCY: ICD-10-CM

## 2019-12-02 DIAGNOSIS — E55.9 VITAMIN D DEFICIENCY: Primary | ICD-10-CM

## 2019-12-02 RX ORDER — LISINOPRIL 10 MG/1
10 TABLET ORAL DAILY
Qty: 90 TABLET | Refills: 3
Start: 2019-12-02 | End: 2020-01-03

## 2019-12-02 RX ORDER — ERGOCALCIFEROL 1.25 MG/1
CAPSULE ORAL
Qty: 25 CAPSULE | Refills: 0 | Status: SHIPPED | OUTPATIENT
Start: 2019-12-02 | End: 2020-09-10

## 2019-12-02 NOTE — TELEPHONE ENCOUNTER
Message sent to pt via my chart with lab results and updates to plan.     Please arrange vit d and b12 level in 3 months

## 2019-12-03 RX ORDER — GABAPENTIN 100 MG/1
CAPSULE ORAL
Qty: 90 CAPSULE | Refills: 3 | Status: SHIPPED | OUTPATIENT
Start: 2019-12-03 | End: 2020-09-10

## 2019-12-04 ENCOUNTER — PATIENT MESSAGE (OUTPATIENT)
Dept: NEUROLOGY | Facility: CLINIC | Age: 49
End: 2019-12-04

## 2019-12-04 DIAGNOSIS — F51.5 NIGHTMARE DISORDER: Primary | ICD-10-CM

## 2019-12-04 RX ORDER — GABAPENTIN 300 MG/1
300 CAPSULE ORAL NIGHTLY
Qty: 90 CAPSULE | Refills: 1 | Status: SHIPPED | OUTPATIENT
Start: 2019-12-04 | End: 2020-06-16

## 2020-01-03 RX ORDER — LISINOPRIL 10 MG/1
TABLET ORAL
Qty: 90 TABLET | Refills: 3 | Status: SHIPPED | OUTPATIENT
Start: 2020-01-03 | End: 2021-01-25 | Stop reason: SDUPTHER

## 2020-01-14 ENCOUNTER — PATIENT MESSAGE (OUTPATIENT)
Dept: INTERNAL MEDICINE | Facility: CLINIC | Age: 50
End: 2020-01-14

## 2020-01-14 NOTE — TELEPHONE ENCOUNTER
I added preventative code to all lab orders   Most labs were linked to annual exam except for B12, ferritin, tibc and FSH levels - I added these to annual labs as well     Please notify pt - rec that she check follow up with counseling insurance company in 2 weeks to allow processing of this

## 2020-02-12 ENCOUNTER — PATIENT MESSAGE (OUTPATIENT)
Dept: INTERNAL MEDICINE | Facility: CLINIC | Age: 50
End: 2020-02-12

## 2020-02-12 ENCOUNTER — TELEPHONE (OUTPATIENT)
Dept: INTERNAL MEDICINE | Facility: CLINIC | Age: 50
End: 2020-02-12

## 2020-04-21 DIAGNOSIS — M10.9 GOUT, UNSPECIFIED CAUSE, UNSPECIFIED CHRONICITY, UNSPECIFIED SITE: ICD-10-CM

## 2020-04-21 RX ORDER — ALLOPURINOL 300 MG/1
300 TABLET ORAL DAILY
Qty: 90 TABLET | Refills: 3 | Status: SHIPPED | OUTPATIENT
Start: 2020-04-21 | End: 2021-04-21 | Stop reason: SDUPTHER

## 2020-07-15 ENCOUNTER — TELEPHONE (OUTPATIENT)
Dept: OPTOMETRY | Facility: CLINIC | Age: 50
End: 2020-07-15

## 2020-09-04 DIAGNOSIS — Z12.39 BREAST CANCER SCREENING: ICD-10-CM

## 2020-09-08 ENCOUNTER — PATIENT OUTREACH (OUTPATIENT)
Dept: ADMINISTRATIVE | Facility: OTHER | Age: 50
End: 2020-09-08

## 2020-09-08 NOTE — PROGRESS NOTES
Care Everywhere: updated  Immunization: updated  Health Maintenance: updated  Media Review: review for outside mammogram report   Legacy Review:   Order placed:   Upcoming appts:  Mammogram scheduling ticket sent to patient's portal on 9/4/2020

## 2020-09-10 ENCOUNTER — OFFICE VISIT (OUTPATIENT)
Dept: OBSTETRICS AND GYNECOLOGY | Facility: CLINIC | Age: 50
End: 2020-09-10
Payer: COMMERCIAL

## 2020-09-10 VITALS
HEIGHT: 66 IN | BODY MASS INDEX: 28.49 KG/M2 | DIASTOLIC BLOOD PRESSURE: 88 MMHG | WEIGHT: 177.25 LBS | SYSTOLIC BLOOD PRESSURE: 146 MMHG

## 2020-09-10 DIAGNOSIS — N93.9 ABNORMAL UTERINE BLEEDING (AUB): ICD-10-CM

## 2020-09-10 DIAGNOSIS — Z12.31 VISIT FOR SCREENING MAMMOGRAM: ICD-10-CM

## 2020-09-10 DIAGNOSIS — Z01.419 WELL WOMAN EXAM WITH ROUTINE GYNECOLOGICAL EXAM: Primary | ICD-10-CM

## 2020-09-10 PROCEDURE — 99999 PR PBB SHADOW E&M-EST. PATIENT-LVL III: CPT | Mod: PBBFAC,,, | Performed by: OBSTETRICS & GYNECOLOGY

## 2020-09-10 PROCEDURE — 3008F BODY MASS INDEX DOCD: CPT | Mod: CPTII,S$GLB,, | Performed by: OBSTETRICS & GYNECOLOGY

## 2020-09-10 PROCEDURE — 3079F PR MOST RECENT DIASTOLIC BLOOD PRESSURE 80-89 MM HG: ICD-10-PCS | Mod: CPTII,S$GLB,, | Performed by: OBSTETRICS & GYNECOLOGY

## 2020-09-10 PROCEDURE — 99396 PR PREVENTIVE VISIT,EST,40-64: ICD-10-PCS | Mod: S$GLB,,, | Performed by: OBSTETRICS & GYNECOLOGY

## 2020-09-10 PROCEDURE — 3077F PR MOST RECENT SYSTOLIC BLOOD PRESSURE >= 140 MM HG: ICD-10-PCS | Mod: CPTII,S$GLB,, | Performed by: OBSTETRICS & GYNECOLOGY

## 2020-09-10 PROCEDURE — 3077F SYST BP >= 140 MM HG: CPT | Mod: CPTII,S$GLB,, | Performed by: OBSTETRICS & GYNECOLOGY

## 2020-09-10 PROCEDURE — 99396 PREV VISIT EST AGE 40-64: CPT | Mod: S$GLB,,, | Performed by: OBSTETRICS & GYNECOLOGY

## 2020-09-10 PROCEDURE — 99999 PR PBB SHADOW E&M-EST. PATIENT-LVL III: ICD-10-PCS | Mod: PBBFAC,,, | Performed by: OBSTETRICS & GYNECOLOGY

## 2020-09-10 PROCEDURE — 3008F PR BODY MASS INDEX (BMI) DOCUMENTED: ICD-10-PCS | Mod: CPTII,S$GLB,, | Performed by: OBSTETRICS & GYNECOLOGY

## 2020-09-10 PROCEDURE — 3079F DIAST BP 80-89 MM HG: CPT | Mod: CPTII,S$GLB,, | Performed by: OBSTETRICS & GYNECOLOGY

## 2020-09-10 RX ORDER — ACETAMINOPHEN AND CODEINE PHOSPHATE 120; 12 MG/5ML; MG/5ML
1 SOLUTION ORAL DAILY
Qty: 84 TABLET | Refills: 4 | Status: SHIPPED | OUTPATIENT
Start: 2020-09-10 | End: 2021-04-14

## 2020-09-10 NOTE — PROGRESS NOTES
SUBJECTIVE:   50 y.o. female   for annual routine Pap and checkup. Patient's last menstrual period was 2020 (exact date)..  She complains of the last 2 cycles lasting longer than 7 days.  She has also had some breast tenderness..        Past Medical History:   Diagnosis Date    Arthritis     cervical spine DJD    Cancer     CIS of cervis s/p conization    Headache(784.0)     Hyperlipidemia     Hypertension     PTSD (post-traumatic stress disorder)      Past Surgical History:   Procedure Laterality Date    conization      for cervical CA in     HAND SURGERY      R hand, repair of a severed tendon    TONSILLECTOMY       Social History     Socioeconomic History    Marital status: Significant Other     Spouse name: Not on file    Number of children: Not on file    Years of education: Not on file    Highest education level: Not on file   Occupational History    Occupation:      Employer: [employer]   Social Needs    Financial resource strain: Not hard at all    Food insecurity     Worry: Never true     Inability: Never true    Transportation needs     Medical: No     Non-medical: No   Tobacco Use    Smoking status: Former Smoker     Quit date: 2001     Years since quittin.4    Smokeless tobacco: Never Used   Substance and Sexual Activity    Alcohol use: Yes     Alcohol/week: 5.0 standard drinks     Types: 5 Glasses of wine per week     Frequency: 4 or more times a week     Drinks per session: 1 or 2     Binge frequency: Less than monthly    Drug use: No    Sexual activity: Yes     Partners: Male     Birth control/protection: Pill   Lifestyle    Physical activity     Days per week: 2 days     Minutes per session: 40 min    Stress: Not on file   Relationships    Social connections     Talks on phone: Once a week     Gets together: Once a week     Attends Cheondoism service: Not on file     Active member of club or organization: Yes      Attends meetings of clubs or organizations: 1 to 4 times per year     Relationship status: Living with partner   Other Topics Concern    Not on file   Social History Narrative    Lives w/boyfriend    Originally from [place]    Moved to [facility] in      Family History   Problem Relation Age of Onset    Cancer Mother         spinal cord tumor    Hypertension Mother     Arthritis Mother     Diabetes Brother     HIV Brother     Macular degeneration Other     Hypertension Father     Cataracts Father     Diabetes Maternal Grandfather     Heart disease Paternal Grandfather     Heart attack Paternal Grandfather     Gout Paternal Grandfather     Amblyopia Neg Hx     Blindness Neg Hx     Glaucoma Neg Hx     Retinal detachment Neg Hx     Strabismus Neg Hx     Stroke Neg Hx     Thyroid disease Neg Hx     Ovarian cancer Neg Hx      OB History    Para Term  AB Living   1   0   1     SAB TAB Ectopic Multiple Live Births                  # Outcome Date GA Lbr Neftaly/2nd Weight Sex Delivery Anes PTL Lv   1 AB  8w0d                Current Outpatient Medications   Medication Sig Dispense Refill    allopurinoL (ZYLOPRIM) 300 MG tablet Take 1 tablet (300 mg total) by mouth once daily. 90 tablet 3    azelastine (ASTELIN) 137 mcg (0.1 %) nasal spray 1 spray (137 mcg total) by Nasal route 2 (two) times daily. 30 mL 11    calcium carbonate/vitamin D3 (CALCIUM 500 + D, D3, ORAL) Take by mouth.      fluticasone propionate (FLONASE) 50 mcg/actuation nasal spray 2 sprays (100 mcg total) by Each Nostril route once daily. 48 g 3    gabapentin (NEURONTIN) 300 MG capsule TAKE ONE CAPSULE BY MOUTH EVERY EVENING 30 capsule 3    lisinopril 10 MG tablet TAKE 1 TABLET BY MOUTH EVERY DAY 90 tablet 3    norethindrone (MICRONOR) 0.35 mg tablet Take 1 tablet (0.35 mg total) by mouth once daily. 84 tablet 4    prazosin (MINIPRESS) 2 MG Cap TAKE 2 CAPSULES BY MOUTH EVERY EVENING 60 capsule 0    omega-3 fatty  "acids-vitamin E (FISH OIL) 1,000 mg Cap Take 2,000 mg by mouth 2 (two) times daily.  0     No current facility-administered medications for this visit.      Allergies: Patient has no known allergies.     ROS:  Constitutional: no weight loss, weight gain, fever, fatigue  Eyes:  No vision changes, glasses/contacts  ENT/Mouth: No ulcers, sinus problems, ears ringing, headache  Cardiovascular: No inability to lie flat, chest pain, exercise intolerance, swelling, heart palpitations  Respiratory: No wheezing, coughing blood, shortness of breath, or cough  Gastrointestinal: No diarrhea, bloody stool, nausea/vomiting, constipation, gas, hemorrhoids  Genitourinary: No blood in urine, painful urination, urgency of urination, frequency of urination, incomplete emptying, incontinence, abnormal bleeding, painful periods, heavy periods, vaginal discharge, vaginal odor, painful intercourse, sexual problems, bleeding after intercourse.  Musculoskeletal: No muscle weakness  Skin/Breast: No painful breasts, nipple discharge, masses, rash, ulcers  Neurological: No passing out, seizures, numbness, headache  Endocrine: No diabetes, hypothyroid, hyperthyroid, hot flashes, hair loss, abnormal hair growth, ance  Psychiatric: No depression, crying  Hematologic: No bruises, bleeding, swollen lymph nodes, anemia.      OBJECTIVE:   The patient appears well, alert, oriented x 3, in no distress.  BP (!) 146/88   Ht 5' 6" (1.676 m)   Wt 80.4 kg (177 lb 4 oz)   LMP 09/07/2020 (Exact Date)   BMI 28.61 kg/m²   NECK: no thyromegaly, trachea midline  SKIN: no acne, striae, hirsutism  BREAST EXAM: breasts appear normal, no suspicious masses, no skin or nipple changes or axillary nodes  ABDOMEN: no hernias, masses, or hepatosplenomegaly  GENITALIA: normal external genitalia, no erythema, no discharge  URETHRA: normal urethra, normal urethral meatus  VAGINA: Normal  CERVIX: no lesions or cervical motion tenderness  UTERUS: normal size, contour, " position, consistency, mobility, non-tender  ADNEXA: normal adnexa and no mass, fullness, tenderness    \  ASSESSMENT:   .Lashae was seen today for well woman and breast pain.    Diagnoses and all orders for this visit:    Well woman exam with routine gynecological exam    Visit for screening mammogram  -     Mammo Digital Screening Bilat w/ Prakash; Future    Abnormal uterine bleeding (AUB)  -     US Pelvis Comp with Transvag NON-OB (xpd); Future  -     CBC auto differential; Future  -     TSH; Future    Other orders  -     norethindrone (MICRONOR) 0.35 mg tablet; Take 1 tablet (0.35 mg total) by mouth once daily.

## 2020-09-16 ENCOUNTER — HOSPITAL ENCOUNTER (OUTPATIENT)
Dept: RADIOLOGY | Facility: OTHER | Age: 50
Discharge: HOME | End: 2020-09-16
Attending: OBSTETRICS & GYNECOLOGY
Payer: COMMERCIAL

## 2020-09-16 ENCOUNTER — TELEPHONE (OUTPATIENT)
Dept: OBSTETRICS AND GYNECOLOGY | Facility: CLINIC | Age: 50
End: 2020-09-16

## 2020-09-16 DIAGNOSIS — Z12.31 VISIT FOR SCREENING MAMMOGRAM: ICD-10-CM

## 2020-09-16 DIAGNOSIS — N93.9 ABNORMAL UTERINE BLEEDING (AUB): ICD-10-CM

## 2020-09-16 PROCEDURE — 76830 US PELVIS COMP WITH TRANSVAG NON-OB (XPD): ICD-10-PCS | Mod: 26,,, | Performed by: RADIOLOGY

## 2020-09-16 PROCEDURE — 76856 US PELVIS COMP WITH TRANSVAG NON-OB (XPD): ICD-10-PCS | Mod: 26,,, | Performed by: RADIOLOGY

## 2020-09-16 PROCEDURE — 76830 TRANSVAGINAL US NON-OB: CPT | Mod: 26,,, | Performed by: RADIOLOGY

## 2020-09-16 PROCEDURE — 77067 SCR MAMMO BI INCL CAD: CPT | Mod: 26,,, | Performed by: RADIOLOGY

## 2020-09-16 PROCEDURE — 77063 MAMMO DIGITAL SCREENING BILAT WITH TOMO: ICD-10-PCS | Mod: 26,,, | Performed by: RADIOLOGY

## 2020-09-16 PROCEDURE — 77063 BREAST TOMOSYNTHESIS BI: CPT | Mod: 26,,, | Performed by: RADIOLOGY

## 2020-09-16 PROCEDURE — 77067 SCR MAMMO BI INCL CAD: CPT | Mod: TC

## 2020-09-16 PROCEDURE — 76856 US EXAM PELVIC COMPLETE: CPT | Mod: 26,,, | Performed by: RADIOLOGY

## 2020-09-16 PROCEDURE — 76830 TRANSVAGINAL US NON-OB: CPT | Mod: TC

## 2020-09-16 PROCEDURE — 77067 MAMMO DIGITAL SCREENING BILAT WITH TOMO: ICD-10-PCS | Mod: 26,,, | Performed by: RADIOLOGY

## 2020-09-17 ENCOUNTER — TELEPHONE (OUTPATIENT)
Dept: OBSTETRICS AND GYNECOLOGY | Facility: CLINIC | Age: 50
End: 2020-09-17

## 2020-09-17 NOTE — TELEPHONE ENCOUNTER
----- Message from Lorraine Santa sent at 9/17/2020  9:26 AM CDT -----   Type:  Patient Returning Call  Who Called:PAOLO CAICEDO   Who Left Message for Patient:Jemma Tian MA  Does the patient know what this is regarding?:Yes  Best Call Back Number:[phone]  Additional Information:

## 2020-09-21 ENCOUNTER — TELEPHONE (OUTPATIENT)
Dept: SLEEP MEDICINE | Facility: CLINIC | Age: 50
End: 2020-09-21

## 2020-09-21 NOTE — TELEPHONE ENCOUNTER
Appointment For: Lashae Colvin ([id number])  Visit Type: ESTABLISHED PATIENT - VIRTUAL ([id number])    9/25/2020   12:40 PM  20 mins.  Ana Cristina Bang NP     [facility]    Patient Comments:  Annual F/U, refill meds.

## 2020-09-25 ENCOUNTER — OFFICE VISIT (OUTPATIENT)
Dept: SLEEP MEDICINE | Facility: CLINIC | Age: 50
End: 2020-09-25
Payer: COMMERCIAL

## 2020-09-25 DIAGNOSIS — M54.40 BILATERAL LOW BACK PAIN WITH SCIATICA, SCIATICA LATERALITY UNSPECIFIED, UNSPECIFIED CHRONICITY: Primary | ICD-10-CM

## 2020-09-25 DIAGNOSIS — I10 ESSENTIAL HYPERTENSION: ICD-10-CM

## 2020-09-25 DIAGNOSIS — F51.5 NIGHTMARE DISORDER: ICD-10-CM

## 2020-09-25 PROCEDURE — 99214 OFFICE O/P EST MOD 30 MIN: CPT | Mod: 95,,, | Performed by: NURSE PRACTITIONER

## 2020-09-25 PROCEDURE — 99214 PR OFFICE/OUTPT VISIT, EST, LEVL IV, 30-39 MIN: ICD-10-PCS | Mod: 95,,, | Performed by: NURSE PRACTITIONER

## 2020-09-25 RX ORDER — PRAZOSIN HYDROCHLORIDE 2 MG/1
4 CAPSULE ORAL NIGHTLY
Qty: 180 CAPSULE | Refills: 3 | Status: SHIPPED | OUTPATIENT
Start: 2020-09-25 | End: 2021-11-29

## 2020-09-25 RX ORDER — GABAPENTIN 300 MG/1
300 CAPSULE ORAL NIGHTLY
Qty: 90 CAPSULE | Refills: 3 | Status: SHIPPED | OUTPATIENT
Start: 2020-09-25 | End: 2021-09-28 | Stop reason: SDUPTHER
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
## 2020-10-01 ENCOUNTER — PROCEDURE VISIT (OUTPATIENT)
Dept: OBSTETRICS AND GYNECOLOGY | Facility: CLINIC | Age: 50
End: 2020-10-01
Payer: COMMERCIAL

## 2020-10-01 VITALS
DIASTOLIC BLOOD PRESSURE: 90 MMHG | SYSTOLIC BLOOD PRESSURE: 134 MMHG | WEIGHT: 180.31 LBS | HEIGHT: 66 IN | BODY MASS INDEX: 28.98 KG/M2

## 2020-10-01 DIAGNOSIS — N93.9 ABNORMAL UTERINE BLEEDING (AUB): Primary | ICD-10-CM

## 2020-10-01 PROCEDURE — 88305 TISSUE EXAM BY PATHOLOGIST: ICD-10-PCS | Mod: 26,,, | Performed by: PATHOLOGY

## 2020-10-01 PROCEDURE — 88305 TISSUE EXAM BY PATHOLOGIST: CPT | Performed by: PATHOLOGY

## 2020-10-01 PROCEDURE — 88305 TISSUE EXAM BY PATHOLOGIST: CPT | Mod: 26,,, | Performed by: PATHOLOGY

## 2020-10-01 NOTE — PROCEDURES
Procedures   DATE: October1st, 2020    TIME: 1330 PM    PROCEDURE: Endometrial biopsy    INDICATION: abnormal uterine bleeding    PATIENT CONSENT:      PRE ENDOMETRIAL BIOPSY COUNSELING:   The patient was informed of the risk of bleeding, infection, uterine perforation and  pain and that the test will rule-out endometrial cancer with accuracy greater than   95%. She was counseled on the alternatives to endometrial biopsy.  All of her   questions were answered.  Patient gives written consent    ANESTHESIA: None    Labs: UPT performed prior to the procedure was negative.    PROCEDURE NOTE:  The cervix was visualized with a speculum and swabbed with Betadinex3.  The anterior lip of the cervix was grasped with a single toothed tenaculum, and a sterile endometrial pipelle was inserted into the uterus to a sound length of 6 cm. 2 passes were made with the pipelle and light amount of tissue was obtained. The specimen was placed in formalin and sent to Pathology for evaluation.     COMPLICATIONS: None    DISPOSITION: The patient tolerated the above procedure well.      POST ENDOMETRIAL BIOPSY COUNSELING:  - Manage post biopsy cramping with NSAIDs or Tylenol.  - Expect spotting or light bleeding for a few days.  - Report bleeding heavier than a period, fever > 101.0 F, worsening pain or a foul  smelling vaginal discharge.      Kelley Bennett MD 10/01/2020 1:40 PM

## 2020-10-06 LAB
COMMENT: NORMAL
FINAL PATHOLOGIC DIAGNOSIS: NORMAL
GROSS: NORMAL

## 2020-11-05 ENCOUNTER — TELEPHONE (OUTPATIENT)
Dept: OPTOMETRY | Facility: CLINIC | Age: 50
End: 2020-11-05

## 2020-11-05 NOTE — TELEPHONE ENCOUNTER
Returned patient call to schedule appointment. 's first available is not until January 6th. No ans LVM

## 2020-12-09 ENCOUNTER — TELEPHONE (OUTPATIENT)
Dept: INTERNAL MEDICINE | Facility: CLINIC | Age: 50
End: 2020-12-09

## 2021-01-11 ENCOUNTER — OFFICE VISIT (OUTPATIENT)
Dept: OPTOMETRY | Facility: CLINIC | Age: 51
End: 2021-01-11
Payer: COMMERCIAL

## 2021-01-11 DIAGNOSIS — H52.13 HIGH MYOPIA, BOTH EYES: ICD-10-CM

## 2021-01-11 DIAGNOSIS — Z46.0 ENCOUNTER FOR FITTING OR ADJUSTMENT OF SPECTACLES OR CONTACT LENSES: Primary | ICD-10-CM

## 2021-01-11 DIAGNOSIS — H35.413 LATTICE DEGENERATION, BOTH EYES: Primary | ICD-10-CM

## 2021-01-11 DIAGNOSIS — H52.223 REGULAR ASTIGMATISM OF BOTH EYES: ICD-10-CM

## 2021-01-11 DIAGNOSIS — H52.4 PRESBYOPIA OF BOTH EYES: ICD-10-CM

## 2021-01-11 DIAGNOSIS — H43.813 POSTERIOR VITREOUS DETACHMENT OF BOTH EYES: ICD-10-CM

## 2021-01-11 DIAGNOSIS — H04.123 DRY EYES, BILATERAL: ICD-10-CM

## 2021-01-11 PROCEDURE — 92014 PR EYE EXAM, EST PATIENT,COMPREHESV: ICD-10-PCS | Mod: S$GLB,,, | Performed by: OPTOMETRIST

## 2021-01-11 PROCEDURE — 92015 PR REFRACTION: ICD-10-PCS | Mod: S$GLB,,, | Performed by: OPTOMETRIST

## 2021-01-11 PROCEDURE — 99999 PR PBB SHADOW E&M-EST. PATIENT-LVL III: ICD-10-PCS | Mod: PBBFAC,,, | Performed by: OPTOMETRIST

## 2021-01-11 PROCEDURE — 92310 PR CONTACT LENS FITTING (NO CHANGE): ICD-10-PCS | Mod: ,,, | Performed by: OPTOMETRIST

## 2021-01-11 PROCEDURE — 99499 NO LOS: ICD-10-PCS | Mod: ,,, | Performed by: OPTOMETRIST

## 2021-01-11 PROCEDURE — 99999 PR PBB SHADOW E&M-EST. PATIENT-LVL III: CPT | Mod: PBBFAC,,, | Performed by: OPTOMETRIST

## 2021-01-11 PROCEDURE — 92015 DETERMINE REFRACTIVE STATE: CPT | Mod: S$GLB,,, | Performed by: OPTOMETRIST

## 2021-01-11 PROCEDURE — 92310 CONTACT LENS FITTING OU: CPT | Mod: ,,, | Performed by: OPTOMETRIST

## 2021-01-11 PROCEDURE — 92014 COMPRE OPH EXAM EST PT 1/>: CPT | Mod: S$GLB,,, | Performed by: OPTOMETRIST

## 2021-01-11 PROCEDURE — 99499 UNLISTED E&M SERVICE: CPT | Mod: ,,, | Performed by: OPTOMETRIST

## 2021-01-25 DIAGNOSIS — I10 ESSENTIAL HYPERTENSION: Primary | ICD-10-CM

## 2021-01-25 DIAGNOSIS — M10.00 IDIOPATHIC GOUT, UNSPECIFIED CHRONICITY, UNSPECIFIED SITE: ICD-10-CM

## 2021-01-27 RX ORDER — LISINOPRIL 10 MG/1
10 TABLET ORAL DAILY
Qty: 90 TABLET | Refills: 0 | Status: SHIPPED | OUTPATIENT
Start: 2021-01-27 | End: 2021-01-28 | Stop reason: SDUPTHER

## 2021-01-28 RX ORDER — LISINOPRIL 10 MG/1
TABLET ORAL
Qty: 90 TABLET | Refills: 0 | Status: SHIPPED | OUTPATIENT
Start: 2021-01-28 | End: 2021-02-24 | Stop reason: SDUPTHER

## 2021-02-03 ENCOUNTER — HOSPITAL ENCOUNTER (OUTPATIENT)
Dept: RADIOLOGY | Facility: HOSPITAL | Age: 51
Discharge: HOME | End: 2021-02-03
Attending: PHYSICIAN ASSISTANT
Payer: COMMERCIAL

## 2021-02-03 ENCOUNTER — OFFICE VISIT (OUTPATIENT)
Dept: ORTHOPEDICS | Facility: CLINIC | Age: 51
End: 2021-02-03
Payer: COMMERCIAL

## 2021-02-03 ENCOUNTER — TELEPHONE (OUTPATIENT)
Dept: OPTOMETRY | Facility: CLINIC | Age: 51
End: 2021-02-03

## 2021-02-03 VITALS
WEIGHT: 182.75 LBS | SYSTOLIC BLOOD PRESSURE: 121 MMHG | DIASTOLIC BLOOD PRESSURE: 76 MMHG | BODY MASS INDEX: 29.37 KG/M2 | HEART RATE: 81 BPM | HEIGHT: 66 IN

## 2021-02-03 DIAGNOSIS — M25.512 LEFT SHOULDER PAIN, UNSPECIFIED CHRONICITY: ICD-10-CM

## 2021-02-03 DIAGNOSIS — M25.512 LEFT SHOULDER PAIN, UNSPECIFIED CHRONICITY: Primary | ICD-10-CM

## 2021-02-03 PROCEDURE — 3078F PR MOST RECENT DIASTOLIC BLOOD PRESSURE < 80 MM HG: ICD-10-PCS | Mod: CPTII,S$GLB,, | Performed by: PHYSICIAN ASSISTANT

## 2021-02-03 PROCEDURE — 1125F AMNT PAIN NOTED PAIN PRSNT: CPT | Mod: S$GLB,,, | Performed by: PHYSICIAN ASSISTANT

## 2021-02-03 PROCEDURE — 1125F PR PAIN SEVERITY QUANTIFIED, PAIN PRESENT: ICD-10-PCS | Mod: S$GLB,,, | Performed by: PHYSICIAN ASSISTANT

## 2021-02-03 PROCEDURE — 3008F BODY MASS INDEX DOCD: CPT | Mod: CPTII,S$GLB,, | Performed by: PHYSICIAN ASSISTANT

## 2021-02-03 PROCEDURE — 73030 X-RAY EXAM OF SHOULDER: CPT | Mod: TC,LT

## 2021-02-03 PROCEDURE — 99214 OFFICE O/P EST MOD 30 MIN: CPT | Mod: 25,S$GLB,, | Performed by: PHYSICIAN ASSISTANT

## 2021-02-03 PROCEDURE — 73030 X-RAY EXAM OF SHOULDER: CPT | Mod: 26,LT,, | Performed by: RADIOLOGY

## 2021-02-03 PROCEDURE — 99999 PR PBB SHADOW E&M-EST. PATIENT-LVL IV: CPT | Mod: PBBFAC,,, | Performed by: PHYSICIAN ASSISTANT

## 2021-02-03 PROCEDURE — 3078F DIAST BP <80 MM HG: CPT | Mod: CPTII,S$GLB,, | Performed by: PHYSICIAN ASSISTANT

## 2021-02-03 PROCEDURE — 73030 XR SHOULDER COMPLETE 2 OR MORE VIEWS LEFT: ICD-10-PCS | Mod: 26,LT,, | Performed by: RADIOLOGY

## 2021-02-03 PROCEDURE — 20610 DRAIN/INJ JOINT/BURSA W/O US: CPT | Mod: LT,S$GLB,, | Performed by: PHYSICIAN ASSISTANT

## 2021-02-03 PROCEDURE — 99999 PR PBB SHADOW E&M-EST. PATIENT-LVL IV: ICD-10-PCS | Mod: PBBFAC,,, | Performed by: PHYSICIAN ASSISTANT

## 2021-02-03 PROCEDURE — 3074F PR MOST RECENT SYSTOLIC BLOOD PRESSURE < 130 MM HG: ICD-10-PCS | Mod: CPTII,S$GLB,, | Performed by: PHYSICIAN ASSISTANT

## 2021-02-03 PROCEDURE — 3008F PR BODY MASS INDEX (BMI) DOCUMENTED: ICD-10-PCS | Mod: CPTII,S$GLB,, | Performed by: PHYSICIAN ASSISTANT

## 2021-02-03 PROCEDURE — 99214 PR OFFICE/OUTPT VISIT, EST, LEVL IV, 30-39 MIN: ICD-10-PCS | Mod: 25,S$GLB,, | Performed by: PHYSICIAN ASSISTANT

## 2021-02-03 PROCEDURE — 3074F SYST BP LT 130 MM HG: CPT | Mod: CPTII,S$GLB,, | Performed by: PHYSICIAN ASSISTANT

## 2021-02-03 PROCEDURE — 20610 PR DRAIN/INJECT LARGE JOINT/BURSA: ICD-10-PCS | Mod: LT,S$GLB,, | Performed by: PHYSICIAN ASSISTANT

## 2021-02-03 RX ORDER — BETAMETHASONE SODIUM PHOSPHATE AND BETAMETHASONE ACETATE 3; 3 MG/ML; MG/ML
6 INJECTION, SUSPENSION INTRA-ARTICULAR; INTRALESIONAL; INTRAMUSCULAR; SOFT TISSUE
Status: COMPLETED | OUTPATIENT
Start: 2021-02-03 | End: 2021-02-03

## 2021-02-03 RX ADMIN — BETAMETHASONE SODIUM PHOSPHATE AND BETAMETHASONE ACETATE 6 MG: 3; 3 INJECTION, SUSPENSION INTRA-ARTICULAR; INTRALESIONAL; INTRAMUSCULAR; SOFT TISSUE at 04:02

## 2021-02-17 ENCOUNTER — PATIENT MESSAGE (OUTPATIENT)
Dept: OPTOMETRY | Facility: CLINIC | Age: 51
End: 2021-02-17

## 2021-02-18 ENCOUNTER — DOCUMENTATION ONLY (OUTPATIENT)
Dept: OPTOMETRY | Facility: CLINIC | Age: 51
End: 2021-02-18

## 2021-02-24 ENCOUNTER — PATIENT MESSAGE (OUTPATIENT)
Dept: INTERNAL MEDICINE | Facility: CLINIC | Age: 51
End: 2021-02-24

## 2021-02-24 DIAGNOSIS — I10 ESSENTIAL HYPERTENSION: Primary | ICD-10-CM

## 2021-02-26 RX ORDER — LISINOPRIL 10 MG/1
10 TABLET ORAL DAILY
Qty: 90 TABLET | Refills: 0 | Status: SHIPPED | OUTPATIENT
Start: 2021-02-26 | End: 2021-06-17 | Stop reason: SDUPTHER

## 2021-04-14 ENCOUNTER — OFFICE VISIT (OUTPATIENT)
Dept: SLEEP MEDICINE | Facility: CLINIC | Age: 51
End: 2021-04-14
Payer: COMMERCIAL

## 2021-04-14 DIAGNOSIS — I10 ESSENTIAL HYPERTENSION: ICD-10-CM

## 2021-04-14 DIAGNOSIS — F51.5 NIGHTMARE DISORDER: Primary | ICD-10-CM

## 2021-04-14 DIAGNOSIS — M54.40 BILATERAL LOW BACK PAIN WITH SCIATICA, SCIATICA LATERALITY UNSPECIFIED, UNSPECIFIED CHRONICITY: ICD-10-CM

## 2021-04-14 DIAGNOSIS — G47.30 SLEEP APNEA, UNSPECIFIED TYPE: ICD-10-CM

## 2021-04-14 PROCEDURE — 99214 PR OFFICE/OUTPT VISIT, EST, LEVL IV, 30-39 MIN: ICD-10-PCS | Mod: 95,CR,, | Performed by: NURSE PRACTITIONER

## 2021-04-14 PROCEDURE — 99214 OFFICE O/P EST MOD 30 MIN: CPT | Mod: 95,CR,, | Performed by: NURSE PRACTITIONER

## 2021-04-21 ENCOUNTER — PATIENT MESSAGE (OUTPATIENT)
Dept: INTERNAL MEDICINE | Facility: CLINIC | Age: 51
End: 2021-04-21

## 2021-04-21 DIAGNOSIS — M10.9 GOUT, UNSPECIFIED CAUSE, UNSPECIFIED CHRONICITY, UNSPECIFIED SITE: ICD-10-CM

## 2021-04-23 RX ORDER — ALLOPURINOL 300 MG/1
300 TABLET ORAL DAILY
Qty: 90 TABLET | Refills: 0 | Status: SHIPPED | OUTPATIENT
Start: 2021-04-23 | End: 2021-07-27 | Stop reason: SDUPTHER

## 2021-04-26 ENCOUNTER — TELEPHONE (OUTPATIENT)
Dept: SLEEP MEDICINE | Facility: OTHER | Age: 51
End: 2021-04-26

## 2021-04-26 ENCOUNTER — PATIENT OUTREACH (OUTPATIENT)
Dept: ADMINISTRATIVE | Facility: HOSPITAL | Age: 51
End: 2021-04-26

## 2021-05-11 ENCOUNTER — TELEPHONE (OUTPATIENT)
Dept: INTERNAL MEDICINE | Facility: CLINIC | Age: 51
End: 2021-05-11

## 2021-05-11 ENCOUNTER — OFFICE VISIT (OUTPATIENT)
Dept: INTERNAL MEDICINE | Facility: CLINIC | Age: 51
End: 2021-05-11
Attending: INTERNAL MEDICINE
Payer: COMMERCIAL

## 2021-05-11 VITALS
WEIGHT: 181.69 LBS | HEART RATE: 68 BPM | DIASTOLIC BLOOD PRESSURE: 94 MMHG | HEIGHT: 66 IN | OXYGEN SATURATION: 100 % | BODY MASS INDEX: 29.2 KG/M2 | SYSTOLIC BLOOD PRESSURE: 138 MMHG

## 2021-05-11 DIAGNOSIS — G89.29 CHRONIC BILATERAL LOW BACK PAIN WITH SCIATICA, SCIATICA LATERALITY UNSPECIFIED: ICD-10-CM

## 2021-05-11 DIAGNOSIS — M10.00 IDIOPATHIC GOUT, UNSPECIFIED CHRONICITY, UNSPECIFIED SITE: ICD-10-CM

## 2021-05-11 DIAGNOSIS — Z00.00 ANNUAL PHYSICAL EXAM: Primary | ICD-10-CM

## 2021-05-11 DIAGNOSIS — F51.5 NIGHTMARE DISORDER: ICD-10-CM

## 2021-05-11 DIAGNOSIS — Z11.59 ENCOUNTER FOR HEPATITIS C SCREENING TEST FOR LOW RISK PATIENT: ICD-10-CM

## 2021-05-11 DIAGNOSIS — M54.17 LUMBOSACRAL RADICULOPATHY: ICD-10-CM

## 2021-05-11 DIAGNOSIS — E78.5 HYPERLIPIDEMIA, UNSPECIFIED HYPERLIPIDEMIA TYPE: ICD-10-CM

## 2021-05-11 DIAGNOSIS — M54.40 CHRONIC BILATERAL LOW BACK PAIN WITH SCIATICA, SCIATICA LATERALITY UNSPECIFIED: ICD-10-CM

## 2021-05-11 DIAGNOSIS — I10 ESSENTIAL HYPERTENSION: ICD-10-CM

## 2021-05-11 DIAGNOSIS — M54.9 DORSALGIA, UNSPECIFIED: ICD-10-CM

## 2021-05-11 PROCEDURE — 99999 PR PBB SHADOW E&M-EST. PATIENT-LVL IV: ICD-10-PCS | Mod: PBBFAC,,, | Performed by: INTERNAL MEDICINE

## 2021-05-11 PROCEDURE — 1125F PR PAIN SEVERITY QUANTIFIED, PAIN PRESENT: ICD-10-PCS | Mod: S$GLB,,, | Performed by: INTERNAL MEDICINE

## 2021-05-11 PROCEDURE — 99396 PR PREVENTIVE VISIT,EST,40-64: ICD-10-PCS | Mod: S$GLB,,, | Performed by: INTERNAL MEDICINE

## 2021-05-11 PROCEDURE — 99396 PREV VISIT EST AGE 40-64: CPT | Mod: S$GLB,,, | Performed by: INTERNAL MEDICINE

## 2021-05-11 PROCEDURE — 1125F AMNT PAIN NOTED PAIN PRSNT: CPT | Mod: S$GLB,,, | Performed by: INTERNAL MEDICINE

## 2021-05-11 PROCEDURE — 3008F BODY MASS INDEX DOCD: CPT | Mod: CPTII,S$GLB,, | Performed by: INTERNAL MEDICINE

## 2021-05-11 PROCEDURE — 99999 PR PBB SHADOW E&M-EST. PATIENT-LVL IV: CPT | Mod: PBBFAC,,, | Performed by: INTERNAL MEDICINE

## 2021-05-11 PROCEDURE — 3008F PR BODY MASS INDEX (BMI) DOCUMENTED: ICD-10-PCS | Mod: CPTII,S$GLB,, | Performed by: INTERNAL MEDICINE

## 2021-05-11 RX ORDER — METHOCARBAMOL 750 MG/1
750 TABLET, FILM COATED ORAL 4 TIMES DAILY PRN
Qty: 45 TABLET | Refills: 0 | Status: SHIPPED | OUTPATIENT
Start: 2021-05-11 | End: 2021-05-21

## 2021-05-11 RX ORDER — DIAZEPAM 5 MG/1
5 TABLET ORAL EVERY 6 HOURS PRN
Qty: 2 TABLET | Refills: 0 | Status: SHIPPED | OUTPATIENT
Start: 2021-05-11 | End: 2021-09-28

## 2021-05-11 RX ORDER — GABAPENTIN 100 MG/1
100 CAPSULE ORAL EVERY MORNING
Qty: 30 CAPSULE | Refills: 1 | Status: SHIPPED | OUTPATIENT
Start: 2021-05-11 | End: 2021-09-28 | Stop reason: SDUPTHER

## 2021-05-14 ENCOUNTER — TELEPHONE (OUTPATIENT)
Dept: SLEEP MEDICINE | Facility: OTHER | Age: 51
End: 2021-05-14

## 2021-05-25 ENCOUNTER — PATIENT MESSAGE (OUTPATIENT)
Dept: ADMINISTRATIVE | Facility: HOSPITAL | Age: 51
End: 2021-05-25

## 2021-05-25 ENCOUNTER — PATIENT OUTREACH (OUTPATIENT)
Dept: ADMINISTRATIVE | Facility: HOSPITAL | Age: 51
End: 2021-05-25

## 2021-06-10 ENCOUNTER — TELEPHONE (OUTPATIENT)
Dept: SLEEP MEDICINE | Facility: OTHER | Age: 51
End: 2021-06-10

## 2021-06-10 ENCOUNTER — HOSPITAL ENCOUNTER (OUTPATIENT)
Dept: RADIOLOGY | Facility: OTHER | Age: 51
Discharge: HOME | End: 2021-06-10
Attending: INTERNAL MEDICINE
Payer: COMMERCIAL

## 2021-06-10 DIAGNOSIS — M54.9 DORSALGIA, UNSPECIFIED: ICD-10-CM

## 2021-06-10 PROCEDURE — 72148 MRI LUMBAR SPINE W/O DYE: CPT | Mod: 26,,, | Performed by: RADIOLOGY

## 2021-06-10 PROCEDURE — 72148 MRI LUMBAR SPINE W/O DYE: CPT | Mod: TC

## 2021-06-10 PROCEDURE — 72148 MRI LUMBAR SPINE WITHOUT CONTRAST: ICD-10-PCS | Mod: 26,,, | Performed by: RADIOLOGY

## 2021-06-11 ENCOUNTER — PATIENT MESSAGE (OUTPATIENT)
Dept: ADMINISTRATIVE | Facility: HOSPITAL | Age: 51
End: 2021-06-11

## 2021-06-11 ENCOUNTER — HOSPITAL ENCOUNTER (OUTPATIENT)
Dept: SLEEP MEDICINE | Facility: OTHER | Age: 51
Discharge: HOME | End: 2021-06-11
Attending: NURSE PRACTITIONER
Payer: COMMERCIAL

## 2021-06-11 DIAGNOSIS — I10 ESSENTIAL HYPERTENSION: ICD-10-CM

## 2021-06-11 DIAGNOSIS — G47.30 SLEEP APNEA, UNSPECIFIED TYPE: ICD-10-CM

## 2021-06-11 DIAGNOSIS — G47.20 SLEEP STAGE DYSFUNCTION: ICD-10-CM

## 2021-06-11 PROCEDURE — 99499 NO LOS: ICD-10-PCS | Mod: ,,, | Performed by: PSYCHIATRY & NEUROLOGY

## 2021-06-11 PROCEDURE — 95800 SLP STDY UNATTENDED: CPT

## 2021-06-11 PROCEDURE — 99499 UNLISTED E&M SERVICE: CPT | Mod: ,,, | Performed by: PSYCHIATRY & NEUROLOGY

## 2021-06-18 RX ORDER — LISINOPRIL 10 MG/1
10 TABLET ORAL DAILY
Qty: 90 TABLET | Refills: 2 | Status: SHIPPED | OUTPATIENT
Start: 2021-06-18 | End: 2022-03-21

## 2021-06-25 PROBLEM — G47.20 SLEEP STAGE DYSFUNCTION: Status: ACTIVE | Noted: 2021-04-14

## 2021-06-29 ENCOUNTER — PATIENT MESSAGE (OUTPATIENT)
Dept: SLEEP MEDICINE | Facility: CLINIC | Age: 51
End: 2021-06-29

## 2021-07-02 ENCOUNTER — LAB VISIT (OUTPATIENT)
Dept: LAB | Facility: OTHER | Age: 51
End: 2021-07-02
Attending: INTERNAL MEDICINE
Payer: COMMERCIAL

## 2021-07-02 DIAGNOSIS — Z11.59 ENCOUNTER FOR HEPATITIS C SCREENING TEST FOR LOW RISK PATIENT: ICD-10-CM

## 2021-07-02 DIAGNOSIS — Z00.00 ANNUAL PHYSICAL EXAM: ICD-10-CM

## 2021-07-02 LAB
25(OH)D3+25(OH)D2 SERPL-MCNC: 39 NG/ML (ref 30–96)
ALBUMIN SERPL BCP-MCNC: 3.5 G/DL (ref 3.5–5.2)
ALP SERPL-CCNC: 75 U/L (ref 55–135)
ALT SERPL W/O P-5'-P-CCNC: 14 U/L (ref 10–44)
ANION GAP SERPL CALC-SCNC: 8 MMOL/L (ref 8–16)
AST SERPL-CCNC: 13 U/L (ref 10–40)
BASOPHILS # BLD AUTO: 0.05 K/UL (ref 0–0.2)
BASOPHILS NFR BLD: 0.8 % (ref 0–1.9)
BILIRUB SERPL-MCNC: 0.5 MG/DL (ref 0.1–1)
BUN SERPL-MCNC: 13 MG/DL (ref 6–20)
CALCIUM SERPL-MCNC: 8.9 MG/DL (ref 8.7–10.5)
CHLORIDE SERPL-SCNC: 105 MMOL/L (ref 95–110)
CO2 SERPL-SCNC: 23 MMOL/L (ref 23–29)
CREAT SERPL-MCNC: 0.8 MG/DL (ref 0.5–1.4)
DIFFERENTIAL METHOD: ABNORMAL
EOSINOPHIL # BLD AUTO: 0.2 K/UL (ref 0–0.5)
EOSINOPHIL NFR BLD: 3 % (ref 0–8)
ERYTHROCYTE [DISTWIDTH] IN BLOOD BY AUTOMATED COUNT: 17 % (ref 11.5–14.5)
EST. GFR  (AFRICAN AMERICAN): >60 ML/MIN/1.73 M^2
EST. GFR  (NON AFRICAN AMERICAN): >60 ML/MIN/1.73 M^2
GLUCOSE SERPL-MCNC: 111 MG/DL (ref 70–110)
HCT VFR BLD AUTO: 36.1 % (ref 37–48.5)
HGB BLD-MCNC: 11.7 G/DL (ref 12–16)
IMM GRANULOCYTES # BLD AUTO: 0.03 K/UL (ref 0–0.04)
IMM GRANULOCYTES NFR BLD AUTO: 0.5 % (ref 0–0.5)
LYMPHOCYTES # BLD AUTO: 1.5 K/UL (ref 1–4.8)
LYMPHOCYTES NFR BLD: 24.8 % (ref 18–48)
MCH RBC QN AUTO: 28.1 PG (ref 27–31)
MCHC RBC AUTO-ENTMCNC: 32.4 G/DL (ref 32–36)
MCV RBC AUTO: 87 FL (ref 82–98)
MONOCYTES # BLD AUTO: 0.4 K/UL (ref 0.3–1)
MONOCYTES NFR BLD: 7 % (ref 4–15)
NEUTROPHILS # BLD AUTO: 3.8 K/UL (ref 1.8–7.7)
NEUTROPHILS NFR BLD: 63.9 % (ref 38–73)
NRBC BLD-RTO: 0 /100 WBC
PLATELET # BLD AUTO: 240 K/UL (ref 150–450)
PMV BLD AUTO: 9.3 FL (ref 9.2–12.9)
POTASSIUM SERPL-SCNC: 4.2 MMOL/L (ref 3.5–5.1)
PROT SERPL-MCNC: 6.4 G/DL (ref 6–8.4)
RBC # BLD AUTO: 4.17 M/UL (ref 4–5.4)
SODIUM SERPL-SCNC: 136 MMOL/L (ref 136–145)
TSH SERPL DL<=0.005 MIU/L-ACNC: 1.85 UIU/ML (ref 0.4–4)
URATE SERPL-MCNC: 4 MG/DL (ref 2.4–5.7)
WBC # BLD AUTO: 6 K/UL (ref 3.9–12.7)

## 2021-07-02 PROCEDURE — 80061 LIPID PANEL: CPT | Performed by: INTERNAL MEDICINE

## 2021-07-02 PROCEDURE — 36415 COLL VENOUS BLD VENIPUNCTURE: CPT | Performed by: INTERNAL MEDICINE

## 2021-07-02 PROCEDURE — 84550 ASSAY OF BLOOD/URIC ACID: CPT | Performed by: INTERNAL MEDICINE

## 2021-07-02 PROCEDURE — 80053 COMPREHEN METABOLIC PANEL: CPT | Performed by: INTERNAL MEDICINE

## 2021-07-02 PROCEDURE — 85025 COMPLETE CBC W/AUTO DIFF WBC: CPT | Performed by: INTERNAL MEDICINE

## 2021-07-02 PROCEDURE — 84443 ASSAY THYROID STIM HORMONE: CPT | Performed by: INTERNAL MEDICINE

## 2021-07-02 PROCEDURE — 86803 HEPATITIS C AB TEST: CPT | Performed by: INTERNAL MEDICINE

## 2021-07-02 PROCEDURE — 82306 VITAMIN D 25 HYDROXY: CPT | Performed by: INTERNAL MEDICINE

## 2021-07-03 LAB
CHOLEST SERPL-MCNC: 250 MG/DL (ref 120–199)
CHOLEST/HDLC SERPL: 4 {RATIO} (ref 2–5)
HDLC SERPL-MCNC: 62 MG/DL (ref 40–75)
HDLC SERPL: 24.8 % (ref 20–50)
LDLC SERPL CALC-MCNC: 140.8 MG/DL (ref 63–159)
NONHDLC SERPL-MCNC: 188 MG/DL
TRIGL SERPL-MCNC: 236 MG/DL (ref 30–150)

## 2021-07-05 LAB — HCV AB SERPL QL IA: NEGATIVE

## 2021-07-08 DIAGNOSIS — D64.9 ANEMIA, UNSPECIFIED TYPE: Primary | ICD-10-CM

## 2021-07-08 DIAGNOSIS — R73.9 HYPERGLYCEMIA: ICD-10-CM

## 2021-07-15 ENCOUNTER — TELEPHONE (OUTPATIENT)
Dept: INTERNAL MEDICINE | Facility: CLINIC | Age: 51
End: 2021-07-15

## 2021-07-27 DIAGNOSIS — M10.9 GOUT, UNSPECIFIED CAUSE, UNSPECIFIED CHRONICITY, UNSPECIFIED SITE: ICD-10-CM

## 2021-07-27 RX ORDER — ALLOPURINOL 300 MG/1
300 TABLET ORAL DAILY
Qty: 90 TABLET | Refills: 0 | Status: SHIPPED | OUTPATIENT
Start: 2021-07-27 | End: 2021-10-22

## 2021-08-05 ENCOUNTER — PATIENT MESSAGE (OUTPATIENT)
Dept: OPTOMETRY | Facility: CLINIC | Age: 51
End: 2021-08-05

## 2021-08-05 ENCOUNTER — OFFICE VISIT (OUTPATIENT)
Dept: OPTOMETRY | Facility: CLINIC | Age: 51
End: 2021-08-05
Payer: COMMERCIAL

## 2021-08-05 DIAGNOSIS — H04.123 DRY EYES, BILATERAL: Primary | ICD-10-CM

## 2021-08-05 PROCEDURE — 92310 PR CONTACT LENS FITTING (NO CHANGE): ICD-10-PCS | Mod: ,,, | Performed by: OPTOMETRIST

## 2021-08-05 PROCEDURE — 99499 NO LOS: ICD-10-PCS | Mod: ,,, | Performed by: OPTOMETRIST

## 2021-08-05 PROCEDURE — 92310 CONTACT LENS FITTING OU: CPT | Mod: ,,, | Performed by: OPTOMETRIST

## 2021-08-05 PROCEDURE — 99499 UNLISTED E&M SERVICE: CPT | Mod: ,,, | Performed by: OPTOMETRIST

## 2021-08-05 RX ORDER — LIFITEGRAST 50 MG/ML
1 SOLUTION/ DROPS OPHTHALMIC 2 TIMES DAILY
Qty: 60 EACH | Refills: 5 | Status: SHIPPED | OUTPATIENT
Start: 2021-08-05 | End: 2021-09-04

## 2021-09-28 ENCOUNTER — OFFICE VISIT (OUTPATIENT)
Dept: INTERNAL MEDICINE | Facility: CLINIC | Age: 51
End: 2021-09-28
Attending: INTERNAL MEDICINE
Payer: COMMERCIAL

## 2021-09-28 ENCOUNTER — LAB VISIT (OUTPATIENT)
Dept: LAB | Facility: OTHER | Age: 51
End: 2021-09-28
Attending: INTERNAL MEDICINE
Payer: COMMERCIAL

## 2021-09-28 VITALS
BODY MASS INDEX: 29.37 KG/M2 | HEIGHT: 66 IN | SYSTOLIC BLOOD PRESSURE: 122 MMHG | WEIGHT: 182.75 LBS | DIASTOLIC BLOOD PRESSURE: 84 MMHG | HEART RATE: 73 BPM | OXYGEN SATURATION: 99 %

## 2021-09-28 DIAGNOSIS — R73.01 IFG (IMPAIRED FASTING GLUCOSE): ICD-10-CM

## 2021-09-28 DIAGNOSIS — M70.72 BURSITIS OF LEFT HIP, UNSPECIFIED BURSA: Primary | ICD-10-CM

## 2021-09-28 DIAGNOSIS — I10 ESSENTIAL HYPERTENSION: ICD-10-CM

## 2021-09-28 DIAGNOSIS — F51.5 NIGHTMARE DISORDER: ICD-10-CM

## 2021-09-28 DIAGNOSIS — D64.9 ANEMIA, UNSPECIFIED TYPE: ICD-10-CM

## 2021-09-28 DIAGNOSIS — Z12.31 ENCOUNTER FOR SCREENING MAMMOGRAM FOR MALIGNANT NEOPLASM OF BREAST: ICD-10-CM

## 2021-09-28 LAB
ESTIMATED AVG GLUCOSE: 114 MG/DL (ref 68–131)
FERRITIN SERPL-MCNC: 60 NG/ML (ref 20–300)
FOLATE SERPL-MCNC: 15.7 NG/ML (ref 4–24)
HBA1C MFR BLD: 5.6 % (ref 4–5.6)
IRON SERPL-MCNC: 58 UG/DL (ref 30–160)
SATURATED IRON: 14 % (ref 20–50)
TOTAL IRON BINDING CAPACITY: 426 UG/DL (ref 250–450)
TRANSFERRIN SERPL-MCNC: 288 MG/DL (ref 200–375)
VIT B12 SERPL-MCNC: 534 PG/ML (ref 210–950)

## 2021-09-28 PROCEDURE — 3008F PR BODY MASS INDEX (BMI) DOCUMENTED: ICD-10-PCS | Mod: CPTII,S$GLB,, | Performed by: INTERNAL MEDICINE

## 2021-09-28 PROCEDURE — 1159F MED LIST DOCD IN RCRD: CPT | Mod: CPTII,S$GLB,, | Performed by: INTERNAL MEDICINE

## 2021-09-28 PROCEDURE — 3079F PR MOST RECENT DIASTOLIC BLOOD PRESSURE 80-89 MM HG: ICD-10-PCS | Mod: CPTII,S$GLB,, | Performed by: INTERNAL MEDICINE

## 2021-09-28 PROCEDURE — 3079F DIAST BP 80-89 MM HG: CPT | Mod: CPTII,S$GLB,, | Performed by: INTERNAL MEDICINE

## 2021-09-28 PROCEDURE — 3044F PR MOST RECENT HEMOGLOBIN A1C LEVEL <7.0%: ICD-10-PCS | Mod: CPTII,S$GLB,, | Performed by: INTERNAL MEDICINE

## 2021-09-28 PROCEDURE — 99214 PR OFFICE/OUTPT VISIT, EST, LEVL IV, 30-39 MIN: ICD-10-PCS | Mod: S$GLB,,, | Performed by: INTERNAL MEDICINE

## 2021-09-28 PROCEDURE — 1159F PR MEDICATION LIST DOCUMENTED IN MEDICAL RECORD: ICD-10-PCS | Mod: CPTII,S$GLB,, | Performed by: INTERNAL MEDICINE

## 2021-09-28 PROCEDURE — 99999 PR PBB SHADOW E&M-EST. PATIENT-LVL III: ICD-10-PCS | Mod: PBBFAC,,, | Performed by: INTERNAL MEDICINE

## 2021-09-28 PROCEDURE — 4010F PR ACE/ARB THEARPY RXD/TAKEN: ICD-10-PCS | Mod: CPTII,S$GLB,, | Performed by: INTERNAL MEDICINE

## 2021-09-28 PROCEDURE — 99214 OFFICE O/P EST MOD 30 MIN: CPT | Mod: S$GLB,,, | Performed by: INTERNAL MEDICINE

## 2021-09-28 PROCEDURE — 1160F RVW MEDS BY RX/DR IN RCRD: CPT | Mod: CPTII,S$GLB,, | Performed by: INTERNAL MEDICINE

## 2021-09-28 PROCEDURE — 84466 ASSAY OF TRANSFERRIN: CPT | Performed by: INTERNAL MEDICINE

## 2021-09-28 PROCEDURE — 4010F ACE/ARB THERAPY RXD/TAKEN: CPT | Mod: CPTII,S$GLB,, | Performed by: INTERNAL MEDICINE

## 2021-09-28 PROCEDURE — 82728 ASSAY OF FERRITIN: CPT | Performed by: INTERNAL MEDICINE

## 2021-09-28 PROCEDURE — 82746 ASSAY OF FOLIC ACID SERUM: CPT | Performed by: INTERNAL MEDICINE

## 2021-09-28 PROCEDURE — 3074F SYST BP LT 130 MM HG: CPT | Mod: CPTII,S$GLB,, | Performed by: INTERNAL MEDICINE

## 2021-09-28 PROCEDURE — 83036 HEMOGLOBIN GLYCOSYLATED A1C: CPT | Performed by: INTERNAL MEDICINE

## 2021-09-28 PROCEDURE — 1160F PR REVIEW ALL MEDS BY PRESCRIBER/CLIN PHARMACIST DOCUMENTED: ICD-10-PCS | Mod: CPTII,S$GLB,, | Performed by: INTERNAL MEDICINE

## 2021-09-28 PROCEDURE — 3044F HG A1C LEVEL LT 7.0%: CPT | Mod: CPTII,S$GLB,, | Performed by: INTERNAL MEDICINE

## 2021-09-28 PROCEDURE — 3008F BODY MASS INDEX DOCD: CPT | Mod: CPTII,S$GLB,, | Performed by: INTERNAL MEDICINE

## 2021-09-28 PROCEDURE — 36415 COLL VENOUS BLD VENIPUNCTURE: CPT | Performed by: INTERNAL MEDICINE

## 2021-09-28 PROCEDURE — 99999 PR PBB SHADOW E&M-EST. PATIENT-LVL III: CPT | Mod: PBBFAC,,, | Performed by: INTERNAL MEDICINE

## 2021-09-28 PROCEDURE — 82607 VITAMIN B-12: CPT | Performed by: INTERNAL MEDICINE

## 2021-09-28 PROCEDURE — 3074F PR MOST RECENT SYSTOLIC BLOOD PRESSURE < 130 MM HG: ICD-10-PCS | Mod: CPTII,S$GLB,, | Performed by: INTERNAL MEDICINE

## 2021-09-28 RX ORDER — GABAPENTIN 300 MG/1
300 CAPSULE ORAL NIGHTLY
Qty: 90 CAPSULE | Refills: 3 | Status: SHIPPED | OUTPATIENT
Start: 2021-09-28 | End: 2021-12-10

## 2021-09-28 RX ORDER — GABAPENTIN 100 MG/1
100 CAPSULE ORAL EVERY MORNING
Qty: 90 CAPSULE | Refills: 3 | Status: SHIPPED | OUTPATIENT
Start: 2021-09-28 | End: 2022-08-17

## 2021-10-04 ENCOUNTER — TELEPHONE (OUTPATIENT)
Dept: INTERNAL MEDICINE | Facility: CLINIC | Age: 51
End: 2021-10-04

## 2021-10-04 DIAGNOSIS — D50.9 IRON DEFICIENCY ANEMIA, UNSPECIFIED IRON DEFICIENCY ANEMIA TYPE: Primary | ICD-10-CM

## 2021-10-07 DIAGNOSIS — M25.552 LEFT HIP PAIN: Primary | ICD-10-CM

## 2021-12-10 ENCOUNTER — PATIENT MESSAGE (OUTPATIENT)
Dept: INTERNAL MEDICINE | Facility: CLINIC | Age: 51
End: 2021-12-10
Payer: COMMERCIAL

## 2021-12-16 ENCOUNTER — PATIENT MESSAGE (OUTPATIENT)
Dept: INTERNAL MEDICINE | Facility: CLINIC | Age: 51
End: 2021-12-16
Payer: COMMERCIAL

## 2022-01-24 DIAGNOSIS — M10.9 GOUT, UNSPECIFIED CAUSE, UNSPECIFIED CHRONICITY, UNSPECIFIED SITE: ICD-10-CM

## 2022-01-24 RX ORDER — ALLOPURINOL 300 MG/1
300 TABLET ORAL DAILY
Qty: 90 TABLET | Refills: 1 | Status: SHIPPED | OUTPATIENT
Start: 2022-01-24 | End: 2022-07-25 | Stop reason: SDUPTHER

## 2022-02-01 DIAGNOSIS — M25.552 LEFT HIP PAIN: Primary | ICD-10-CM

## 2022-02-02 ENCOUNTER — OFFICE VISIT (OUTPATIENT)
Dept: ORTHOPEDICS | Facility: CLINIC | Age: 52
End: 2022-02-02
Payer: COMMERCIAL

## 2022-02-02 ENCOUNTER — HOSPITAL ENCOUNTER (OUTPATIENT)
Dept: RADIOLOGY | Facility: HOSPITAL | Age: 52
Discharge: HOME | End: 2022-02-02
Attending: ORTHOPAEDIC SURGERY
Payer: COMMERCIAL

## 2022-02-02 VITALS — BODY MASS INDEX: 28.93 KG/M2 | WEIGHT: 180 LBS | HEIGHT: 66 IN

## 2022-02-02 DIAGNOSIS — M25.552 LEFT HIP PAIN: ICD-10-CM

## 2022-02-02 DIAGNOSIS — M87.052 AVASCULAR NECROSIS OF BONE OF LEFT HIP: Primary | ICD-10-CM

## 2022-02-02 PROCEDURE — 3008F PR BODY MASS INDEX (BMI) DOCUMENTED: ICD-10-PCS | Mod: CPTII,S$GLB,, | Performed by: ORTHOPAEDIC SURGERY

## 2022-02-02 PROCEDURE — 73502 X-RAY EXAM HIP UNI 2-3 VIEWS: CPT | Mod: TC,LT

## 2022-02-02 PROCEDURE — 99214 OFFICE O/P EST MOD 30 MIN: CPT | Mod: S$GLB,,, | Performed by: ORTHOPAEDIC SURGERY

## 2022-02-02 PROCEDURE — 99214 PR OFFICE/OUTPT VISIT, EST, LEVL IV, 30-39 MIN: ICD-10-PCS | Mod: S$GLB,,, | Performed by: ORTHOPAEDIC SURGERY

## 2022-02-02 PROCEDURE — 99999 PR PBB SHADOW E&M-EST. PATIENT-LVL III: CPT | Mod: PBBFAC,,, | Performed by: ORTHOPAEDIC SURGERY

## 2022-02-02 PROCEDURE — 1160F RVW MEDS BY RX/DR IN RCRD: CPT | Mod: CPTII,S$GLB,, | Performed by: ORTHOPAEDIC SURGERY

## 2022-02-02 PROCEDURE — 3008F BODY MASS INDEX DOCD: CPT | Mod: CPTII,S$GLB,, | Performed by: ORTHOPAEDIC SURGERY

## 2022-02-02 PROCEDURE — 99999 PR PBB SHADOW E&M-EST. PATIENT-LVL III: ICD-10-PCS | Mod: PBBFAC,,, | Performed by: ORTHOPAEDIC SURGERY

## 2022-02-02 PROCEDURE — 1159F PR MEDICATION LIST DOCUMENTED IN MEDICAL RECORD: ICD-10-PCS | Mod: CPTII,S$GLB,, | Performed by: ORTHOPAEDIC SURGERY

## 2022-02-02 PROCEDURE — 73502 XR HIP WITH PELVIS WHEN PERFORMED, 2 OR 3 VIEWS LEFT: ICD-10-PCS | Mod: 26,LT,, | Performed by: RADIOLOGY

## 2022-02-02 PROCEDURE — 1160F PR REVIEW ALL MEDS BY PRESCRIBER/CLIN PHARMACIST DOCUMENTED: ICD-10-PCS | Mod: CPTII,S$GLB,, | Performed by: ORTHOPAEDIC SURGERY

## 2022-02-02 PROCEDURE — 73502 X-RAY EXAM HIP UNI 2-3 VIEWS: CPT | Mod: 26,LT,, | Performed by: RADIOLOGY

## 2022-02-02 PROCEDURE — 1159F MED LIST DOCD IN RCRD: CPT | Mod: CPTII,S$GLB,, | Performed by: ORTHOPAEDIC SURGERY

## 2022-02-02 RX ORDER — DIAZEPAM 5 MG/1
TABLET ORAL
Qty: 2 TABLET | Refills: 1 | Status: SHIPPED | OUTPATIENT
Start: 2022-02-02 | End: 2022-06-30

## 2022-02-02 RX ORDER — MELOXICAM 15 MG/1
15 TABLET ORAL DAILY
Qty: 30 TABLET | Refills: 1 | Status: SHIPPED | OUTPATIENT
Start: 2022-02-02 | End: 2022-03-23

## 2022-02-02 NOTE — PROGRESS NOTES
Subjective:      Patient ID: Lashae Colvin is a 51 y.o. female.    Chief Complaint: Pain of the Left Hip    HPI  Lashae Colvin is a 51 year old female here with a several month history of left hip pain. The patient is a therapist. There was not a history of trauma.  The pain is moderate The pain is located in the groin and lateral.  There is is radiation.  The pain radaites to the thigh.  The pain is described as achy. The patient has not had prior surgery. It is aggravated by sitting, standing and walking.  It  is not alleviated by rest. There is numbness or tingling of the lower extremity.  There is back pain.  She  has not tried medications or injections.  She does have difficulty getting in or out of a car, getting dressed, or going up or down stairs.  The patient does use an assistive device. Past PO steroid use.  2-5 drinks/day.    Past Medical History:   Diagnosis Date    Arthritis     cervical spine DJD    Cancer     CIS of cervis s/p conization    Headache(784.0)     Hyperlipidemia     Hypertension     PTSD (post-traumatic stress disorder)      Past Surgical History:   Procedure Laterality Date    conization      for cervical CA in 2002    EYE SURGERY  2014    HAND SURGERY      R hand, repair of a severed tendon    TONSILLECTOMY       Family History   Problem Relation Age of Onset    Cancer Mother         spinal cord tumor    Hypertension Mother     Arthritis Mother     Vision loss Mother         AMD    Osteoporosis Mother     Diabetes Brother     HIV Brother     Stroke Brother     Macular degeneration Other     Hypertension Father     Cataracts Father     Diabetes Maternal Grandfather     Heart disease Paternal Grandfather     Heart attack Paternal Grandfather     Gout Paternal Grandfather     Osteoporosis Paternal Grandmother     Amblyopia Neg Hx     Blindness Neg Hx     Glaucoma Neg Hx     Retinal detachment Neg Hx     Strabismus Neg Hx     Thyroid disease  Neg Hx     Ovarian cancer Neg Hx      Social History     Socioeconomic History    Marital status: Significant Other   Occupational History    Occupation:      Employer: [employer]   Tobacco Use    Smoking status: Former Smoker     Packs/day: 0.00     Years: 0.00     Pack years: 0.00     Quit date: 2001     Years since quittin.8    Smokeless tobacco: Never Used   Substance and Sexual Activity    Alcohol use: Yes     Alcohol/week: 5.0 standard drinks     Types: 5 Glasses of wine per week    Drug use: No    Sexual activity: Yes     Partners: Male     Birth control/protection: Pill   Social History Narrative    Lives w/boyfriend    Originally from [place]    Moved to [facility] in      Social Determinants of Health     Financial Resource Strain: Low Risk     Difficulty of Paying Living Expenses: Not hard at all   Food Insecurity: No Food Insecurity    Worried About Running Out of Food in the Last Year: Never true    Ran Out of Food in the Last Year: Never true   Transportation Needs: No Transportation Needs    Lack of Transportation (Medical): No    Lack of Transportation (Non-Medical): No   Physical Activity: Insufficiently Active    Days of Exercise per Week: 4 days    Minutes of Exercise per Session: 20 min   Stress: Unknown    Feeling of Stress : Patient refused   Social Connections: Unknown    Frequency of Communication with Friends and Family: Once a week    Frequency of Social Gatherings with Friends and Family: Once a week    Active Member of Clubs or Organizations: Yes    Attends Club or Organization Meetings: 1 to 4 times per year    Marital Status: Living with partner     Current Outpatient Medications on File Prior to Visit   Medication Sig Dispense Refill    allopurinoL (ZYLOPRIM) 300 MG tablet Take 1 tablet (300 mg total) by mouth once daily. 90 tablet 1    calcium carbonate/vitamin D3 (CALCIUM 500 + D, D3, ORAL) Take by mouth.       "fluticasone propionate (FLONASE) 50 mcg/actuation nasal spray 2 sprays (100 mcg total) by Each Nostril route once daily. 48 g 3    gabapentin (NEURONTIN) 100 MG capsule Take 1 capsule (100 mg total) by mouth every morning. And continue current prescription of 300mg at night 90 capsule 3    gabapentin (NEURONTIN) 300 MG capsule TAKE 1 CAPSULE BY MOUTH EVERY EVENING 90 capsule 3    lisinopriL 10 MG tablet Take 1 tablet (10 mg total) by mouth once daily. 90 tablet 2    prazosin (MINIPRESS) 2 MG Cap TAKE TWO CAPSULES BY MOUTH EVERY EVENING 180 capsule 1    omega-3 fatty acids-vitamin E (FISH OIL) 1,000 mg Cap Take 2,000 mg by mouth 2 (two) times daily.  0     No current facility-administered medications on file prior to visit.     Review of patient's allergies indicates:  No Known Allergies    Review of Systems   Constitutional: Negative for chills, fever and night sweats.   HENT: Negative for hearing loss.    Eyes: Negative for blurred vision and double vision.   Cardiovascular: Negative for chest pain, claudication and leg swelling.   Respiratory: Negative for shortness of breath.    Endocrine: Negative for polydipsia, polyphagia and polyuria.   Hematologic/Lymphatic: Negative for adenopathy and bleeding problem. Does not bruise/bleed easily.   Skin: Negative for poor wound healing.   Gastrointestinal: Negative for diarrhea and heartburn.   Genitourinary: Negative for bladder incontinence.   Neurological: Negative for focal weakness, headaches, numbness, paresthesias and sensory change.   Psychiatric/Behavioral: The patient is not nervous/anxious.    Allergic/Immunologic: Negative for persistent infections.         Objective:      Body mass index is 29.05 kg/m².  Vitals:    02/02/22 1109   Weight: 81.6 kg (180 lb)   Height: 5' 6" (1.676 m)         General    Constitutional: She is oriented to person, place, and time. She appears well-developed and well-nourished.   HENT:   Head: Normocephalic and atraumatic. "   Eyes: EOM are normal.   Cardiovascular: Normal rate.    Pulmonary/Chest: Effort normal.   Neurological: She is alert and oriented to person, place, and time.   Psychiatric: She has a normal mood and affect. Her behavior is normal.     General Musculoskeletal Exam   Gait: abnormal and antalgic   Pelvic Obliquity: none      Right Knee Exam     Inspection   Alignment:  normal  Effusion: absent    Left Knee Exam     Inspection   Alignment:  normal  Effusion: absent    Right Hip Exam     Inspection   Scars: absent  Swelling: absent  Bruising: absent  No deformity of hip.  Quadriceps Atrophy:  Negative  Erythema: absent    Range of Motion   Abduction: 25   Adduction: 20   Extension: 0   Flexion: 100   External rotation: 30   Internal rotation: 25     Tests   Pain w/ forced internal rotation (ROOPA): absent  Stinchfield test: negative    Other   Sensation: normal  Left Hip Exam     Inspection   Scars: absent  Swelling: absent  No deformity of hip.  Quadriceps Atrophy:  negative  Erythema: absent  Bruising: absent    Range of Motion   Abduction: 20   Adduction: 15   Extension: 0   Flexion: 90   External rotation: 20   Internal rotation: 20     Tests   Pain w/ forced internal rotation (ROOPA): present  Stinchfield test: positive    Other   Sensation: normal      Back (L-Spine & T-Spine) / Neck (C-Spine) Exam     Back (L-Spine & T-Spine) Range of Motion   The patient has abnormal back ROM.      Muscle Strength   Right Lower Extremity   Hip Abduction: 5/5   Hip Adduction: 5/5   Hip Flexion: 5/5   Ankle Dorsiflexion:  5/5   Left Lower Extremity   Hip Abduction: 5/5   Hip Adduction: 5/5   Hip Flexion: 5/5   Ankle Dorsiflexion:  5/5     Reflexes     Left Side  Quadriceps:  2+    Right Side   Quadriceps:  2+    Vascular Exam     Right Pulses  Dorsalis Pedis:      2+          Left Pulses  Dorsalis Pedis:      2+          Capillary Refill  Right Hand: normal capillary refill  Left Hand: normal capillary refill    Edema  Right  Upper Leg: absent  Left Upper Leg: absent    Radiographs taken today and reviewed by me demonstrate Sclerosis of the left femoral head with questionable collapse            Assessment:       Encounter Diagnosis   Name Primary?    Avascular necrosis of bone of left hip Yes          Plan:       Lashae was seen today for pain.    Diagnoses and all orders for this visit:    Avascular necrosis of bone of left hip    Other orders  -     meloxicam (MOBIC) 15 MG tablet; Take 1 tablet (15 mg total) by mouth once daily.      MRI hip to stage AVN. Will call with results

## 2022-02-09 ENCOUNTER — HOSPITAL ENCOUNTER (OUTPATIENT)
Dept: RADIOLOGY | Facility: HOSPITAL | Age: 52
Discharge: HOME | End: 2022-02-09
Attending: ORTHOPAEDIC SURGERY
Payer: COMMERCIAL

## 2022-02-09 DIAGNOSIS — M87.052 AVASCULAR NECROSIS OF BONE OF LEFT HIP: ICD-10-CM

## 2022-02-09 PROCEDURE — 73721 MRI JNT OF LWR EXTRE W/O DYE: CPT | Mod: 26,LT,, | Performed by: RADIOLOGY

## 2022-02-09 PROCEDURE — 73721 MRI HIP WITHOUT CONTRAST LEFT: ICD-10-PCS | Mod: 26,LT,, | Performed by: RADIOLOGY

## 2022-02-09 PROCEDURE — 73721 MRI JNT OF LWR EXTRE W/O DYE: CPT | Mod: TC,LT

## 2022-02-11 ENCOUNTER — TELEPHONE (OUTPATIENT)
Dept: ORTHOPEDICS | Facility: CLINIC | Age: 52
End: 2022-02-11
Payer: COMMERCIAL

## 2022-02-11 ENCOUNTER — TELEPHONE (OUTPATIENT)
Dept: OPHTHALMOLOGY | Facility: CLINIC | Age: 52
End: 2022-02-11
Payer: COMMERCIAL

## 2022-02-11 NOTE — TELEPHONE ENCOUNTER
Spoke with pt, notified her of her results and scheduled her a f/u appt.    ----- Message from Opal Elizondo sent at 2/11/2022  2:05 PM CST -----  Regarding: call back  Contact: pt  Pt returning call back.      Pt @ [phone]

## 2022-02-11 NOTE — TELEPHONE ENCOUNTER
Lm for pt to call back in regards to her MRI.    ----- Message from Jorge Mendez MD sent at 2/10/2022 12:16 PM CST -----  Please all pt.  She does have some early collapse of the femoral head.  Schedule f/u vortual or in person.

## 2022-02-11 NOTE — TELEPHONE ENCOUNTER
Called patient lvm to schedule her appointment     ----- Message from Manny Pathak sent at 2/11/2022 12:12 PM CST -----  Pt calling to schedule clfu appt    Call # [phone]

## 2022-02-15 ENCOUNTER — PATIENT MESSAGE (OUTPATIENT)
Dept: INTERNAL MEDICINE | Facility: CLINIC | Age: 52
End: 2022-02-15
Payer: COMMERCIAL

## 2022-03-13 DIAGNOSIS — I10 ESSENTIAL HYPERTENSION: ICD-10-CM

## 2022-03-21 ENCOUNTER — PATIENT MESSAGE (OUTPATIENT)
Dept: ADMINISTRATIVE | Facility: HOSPITAL | Age: 52
End: 2022-03-21
Payer: COMMERCIAL

## 2022-03-21 RX ORDER — LISINOPRIL 10 MG/1
10 TABLET ORAL DAILY
Qty: 90 TABLET | Refills: 2 | OUTPATIENT
Start: 2022-03-21

## 2022-03-22 NOTE — TELEPHONE ENCOUNTER
This medication has been reordered by PCP already.    Will remove duplicate and close out encounter  Note composed:9:43 PM 03/21/2022

## 2022-04-05 ENCOUNTER — PATIENT OUTREACH (OUTPATIENT)
Dept: ADMINISTRATIVE | Facility: OTHER | Age: 52
End: 2022-04-05
Payer: COMMERCIAL

## 2022-04-05 DIAGNOSIS — Z12.11 ENCOUNTER FOR FIT (FECAL IMMUNOCHEMICAL TEST) SCREENING: Primary | ICD-10-CM

## 2022-04-05 NOTE — PROGRESS NOTES
Requested updates within Care Everywhere.  Patient's chart was reviewed for overdue JARRET topics.  Health maintenance:updated  Immunizations:reconciled   Legacy:   Media:  Orders placed:Fitkit  Tasked appts:  Labs Linked:  Upcoming appt:

## 2022-04-06 ENCOUNTER — OFFICE VISIT (OUTPATIENT)
Dept: OPTOMETRY | Facility: CLINIC | Age: 52
End: 2022-04-06
Payer: COMMERCIAL

## 2022-04-06 DIAGNOSIS — Z46.0 ENCOUNTER FOR FITTING OR ADJUSTMENT OF SPECTACLES OR CONTACT LENSES: Primary | ICD-10-CM

## 2022-04-06 DIAGNOSIS — H52.223 REGULAR ASTIGMATISM OF BOTH EYES: ICD-10-CM

## 2022-04-06 DIAGNOSIS — H52.4 PRESBYOPIA OF BOTH EYES: ICD-10-CM

## 2022-04-06 DIAGNOSIS — H43.813 POSTERIOR VITREOUS DETACHMENT OF BOTH EYES: ICD-10-CM

## 2022-04-06 DIAGNOSIS — H52.13 HIGH MYOPIA, BOTH EYES: ICD-10-CM

## 2022-04-06 DIAGNOSIS — H35.413 LATTICE DEGENERATION, BOTH EYES: ICD-10-CM

## 2022-04-06 DIAGNOSIS — H04.123 DRY EYES, BILATERAL: Primary | ICD-10-CM

## 2022-04-06 PROCEDURE — 4010F PR ACE/ARB THEARPY RXD/TAKEN: ICD-10-PCS | Mod: CPTII,S$GLB,, | Performed by: OPTOMETRIST

## 2022-04-06 PROCEDURE — 99499 NO LOS: ICD-10-PCS | Mod: S$GLB,,, | Performed by: OPTOMETRIST

## 2022-04-06 PROCEDURE — 99999 PR PBB SHADOW E&M-EST. PATIENT-LVL IV: CPT | Mod: PBBFAC,,, | Performed by: OPTOMETRIST

## 2022-04-06 PROCEDURE — 1159F MED LIST DOCD IN RCRD: CPT | Mod: CPTII,S$GLB,, | Performed by: OPTOMETRIST

## 2022-04-06 PROCEDURE — 92014 PR EYE EXAM, EST PATIENT,COMPREHESV: ICD-10-PCS | Mod: S$GLB,,, | Performed by: OPTOMETRIST

## 2022-04-06 PROCEDURE — 92015 DETERMINE REFRACTIVE STATE: CPT | Mod: S$GLB,,, | Performed by: OPTOMETRIST

## 2022-04-06 PROCEDURE — 92014 COMPRE OPH EXAM EST PT 1/>: CPT | Mod: S$GLB,,, | Performed by: OPTOMETRIST

## 2022-04-06 PROCEDURE — 99499 UNLISTED E&M SERVICE: CPT | Mod: S$GLB,,, | Performed by: OPTOMETRIST

## 2022-04-06 PROCEDURE — 92015 PR REFRACTION: ICD-10-PCS | Mod: S$GLB,,, | Performed by: OPTOMETRIST

## 2022-04-06 PROCEDURE — 92310 PR CONTACT LENS FITTING (NO CHANGE): ICD-10-PCS | Mod: S$GLB,,, | Performed by: OPTOMETRIST

## 2022-04-06 PROCEDURE — 92310 CONTACT LENS FITTING OU: CPT | Mod: S$GLB,,, | Performed by: OPTOMETRIST

## 2022-04-06 PROCEDURE — 99999 PR PBB SHADOW E&M-EST. PATIENT-LVL IV: ICD-10-PCS | Mod: PBBFAC,,, | Performed by: OPTOMETRIST

## 2022-04-06 PROCEDURE — 4010F ACE/ARB THERAPY RXD/TAKEN: CPT | Mod: CPTII,S$GLB,, | Performed by: OPTOMETRIST

## 2022-04-06 PROCEDURE — 1159F PR MEDICATION LIST DOCUMENTED IN MEDICAL RECORD: ICD-10-PCS | Mod: CPTII,S$GLB,, | Performed by: OPTOMETRIST

## 2022-04-06 RX ORDER — LIFITEGRAST 50 MG/ML
1 SOLUTION/ DROPS OPHTHALMIC 2 TIMES DAILY
Qty: 60 EACH | Refills: 5 | Status: SHIPPED | OUTPATIENT
Start: 2022-04-06 | End: 2022-08-17

## 2022-04-06 NOTE — PROGRESS NOTES
"HPI     eye examination       Additional comments: Annual general eye exam and refraction and contact   lens follow-up.  Persistent dry eye symptoms.  Has not yet tried Xiidra, but would like an   updated Rx for Xiidra ophthalmic solution.  Some blurring of vision in each eye with the CLs, but VA clears with blink                 Comments     Patient's age: 52 y.o. WF   Occupation: [employer]   Approximate date of last eye examination:  01/11/2021  Name of last eye doctor seen:   City/State: [facility]   Wears glasses? Yes      If yes, wears  Full-time or part-time?  Part time   Present glasses are: Bifocal, SV Distance, SV Reading?  SV distance lenses     Approximate age of present glasses:  approximately 5 years   Got new glasses following last exam, or subsequently?:  No    Any problem with VA with glasses?  needs new  glasses   Wears CLs?:  New trial Biofinity Toric SCLs:                                                             OD  8.7   14.5     -8.00 -1.25 x 040                                                             OS  8.7   14.5     -10.50 -1.25 x 140                Uses reading glasses over CLs as needed.               Wears full-time or part-time:  Full time                Sleeps with contact lenses:  No                CL Solution used:  Chhaya or Biotrue                How often replace CLs:  Monthly               Any problem with VA with CLs?  yes - lenses feel dry and seem   blurry throughout the day - in the AM both eye's are red and gritty   feeling                  Headaches?  NO   Eye pain/discomfort?  as noted above                                                                                       Flashes?  No   Floaters?  "Still plenty of floaters"  Diplopia/Double vision? Yes, OU    Patient's Ocular History:          Any eye surgeries? No          Any eye injury?  No, but recent dog bite on lower left eyelid, with   ecchymosis of left lower lid         " " Any treatment for eye disease?  s/p laser treatment to OS for small   hole.  H/O posterior vitreous detachment in both eyes.   Family history of eye disease?  both parent Cataracts, mother Mac   degeneration wet/dry   Significant patient medical history:         1. Diabetes?  No      If yes, IDDM or NIDDM?   n/a  2. HBP?  Yes, controlled with medication               3. Other (describe):  None    ! OTC eyedrops currently using:  Blinx rewetting solution prn OU Lumify   in AM     ! Prescription eye meds currently using:  No    ! Any history of allergy/adverse reaction to any eye meds used   previously?  No      ! Any history of allergy/adverse reaction to eyedrops used during prior   eye exam(s)? Yes, pt noticed OU would dilate if she uses Clear Eyes - no   problems otherwise     ! Any history of allergy/adverse reaction to Novacaine or similar meds?   No    ! Any history of allergy/adverse reaction to Epinephrine or similar meds?   No    ! Patient okay with use of anesthetic eyedrops to check eye pressure?    Yes          ! Patient okay with use of eyedrops to dilate pupils today?  Yes    !  Allergies/Medications/Medical History/Family History reviewed today?    Yes       PD =  68/64  Desired reading distance =  16.25"          Last edited by Slim Paul, OD on 4/6/2022  3:39 PM. (History)            Assessment /Plan     For exam results, see Encounter Report.    1. Dry eyes, bilateral  lifitegrast (XIIDRA) 5 % Dpet   2. Lattice degeneration, both eyes     3. Posterior vitreous detachment of both eyes     4. High myopia, both eyes     5. Regular astigmatism of both eyes     6. Presbyopia of both eyes                    Ms. Kiran notes persistent problems with dry eye symptoms.   Previously requested  Rx for Xiidra Ophthalmic solution, but never had Rx filled.  Requests updated Rs.   Issued Rx for Xiidra Ophthalmic Solution, and explained use with CL wear - do not insert CLs any sooner than 15 - 20 " minutes following instillation of Xiidra in the morning, and instill one drop in each eye in the evening following removal of the contact lenses.     High myopia with astigmatism in each eye per post-contact lens refraction done today.  Presbyopia consistent with age.  New spectacle lens Rx issued for use in lieu of toric soft contact lenses.    Good fit with present Biofinity Toric SCLs in both eyes.  Power of the right lens fine as is per over-refraction done today.  Showing need for minor power change in the left lens.  New contact lens Rx issued.  Use OTC reading glasses as needed over CLs.    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  S/P focal laser treatment in the left eye.   Appears stable.  Fundus photos taken with [employer] instrument for photodocumentation.    Recheck in one year - or prior, if any problems noted in the interim.

## 2022-04-06 NOTE — PROGRESS NOTES
HPI     Contact Lens Follow Up      Additional comments: Patient in today for contact lens follow-up with   general eye examination.  Refer to additional patient encounter notes   dated 04/06/2022.                  Comments     Patient in today for contact lens follow-up with general eye examination.    Refer to additional patient encounter notes dated 04/06/2022.            Last edited by Slim Paul, OD on 4/6/2022  3:30 PM. (History)            Assessment /Plan     For exam results, see Encounter Report.    1. Encounter for fitting or adjustment of spectacles or contact lenses                      Ms. Kiran notes persistent problems with dry eye symptoms.   Previously requested  Rx for Xiidra Ophthalmic solution, but never had Rx filled.  Requests updated Rs.   Issued Rx for Xiidra Ophthalmic Solution, and explained use with CL wear - do not insert CLs any sooner than 15 - 20 minutes following instillation of Xiidra in the morning, and instill one drop in each eye in the evening following removal of the contact lenses.     High myopia with astigmatism in each eye per post-contact lens refraction done today.  Presbyopia consistent with age.  New spectacle lens Rx issued for use in lieu of toric soft contact lenses.    Good fit with present Biofinity Toric SCLs in both eyes.  Power of the right lens fine as is per over-refraction done today.  Showing need for minor power change in the left lens.  New contact lens Rx issued.  Use OTC reading glasses as needed over CLs.    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  S/P focal laser treatment in the left eye.   Appears stable.  Fundus photos taken with [employer] instrument for photodocumentation.    Recheck in one year - or prior, if any problems noted in the interim.

## 2022-04-06 NOTE — PATIENT INSTRUCTIONS
Ms. Kiran notes persistent problems with dry eye symptoms.   Previously requested  Rx for Xiidra Ophthalmic solution, but never had Rx filled.  Requests updated Rs.   Issued Rx for Xiidra Ophthalmic Solution, and explained use with CL wear - do not insert CLs any sooner than 15 - 20 minutes following instillation of Xiidra in the morning, and instill one drop in each eye in the evening following removal of the contact lenses.     High myopia with astigmatism in each eye per post-contact lens refraction done today.  Presbyopia consistent with age.  New spectacle lens Rx issued for use in lieu of toric soft contact lenses.    Good fit with present Biofinity Toric SCLs in both eyes.  Power of the right lens fine as is per over-refraction done today.  Showing need for minor power change in the left lens.  New contact lens Rx issued.  Use OTC reading glasses as needed over CLs.    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  S/P focal laser treatment in the left eye.   Appears stable.  Fundus photos taken with [employer] instrument for photodocumentation.    Recheck in one year - or prior, if any problems noted in the interim.

## 2022-05-15 ENCOUNTER — PATIENT MESSAGE (OUTPATIENT)
Dept: ADMINISTRATIVE | Facility: OTHER | Age: 52
End: 2022-05-15
Payer: COMMERCIAL

## 2022-05-30 ENCOUNTER — PATIENT MESSAGE (OUTPATIENT)
Dept: ADMINISTRATIVE | Facility: HOSPITAL | Age: 52
End: 2022-05-30
Payer: COMMERCIAL

## 2022-06-28 DIAGNOSIS — Z12.11 SCREENING FOR COLON CANCER: ICD-10-CM

## 2022-06-30 ENCOUNTER — OFFICE VISIT (OUTPATIENT)
Dept: SLEEP MEDICINE | Facility: CLINIC | Age: 52
End: 2022-06-30
Payer: COMMERCIAL

## 2022-06-30 ENCOUNTER — PATIENT MESSAGE (OUTPATIENT)
Dept: SLEEP MEDICINE | Facility: CLINIC | Age: 52
End: 2022-06-30
Payer: COMMERCIAL

## 2022-06-30 DIAGNOSIS — F51.5 NIGHTMARE DISORDER: ICD-10-CM

## 2022-06-30 DIAGNOSIS — I10 PRIMARY HYPERTENSION: Primary | ICD-10-CM

## 2022-06-30 PROCEDURE — 4010F ACE/ARB THERAPY RXD/TAKEN: CPT | Mod: CPTII,95,, | Performed by: NURSE PRACTITIONER

## 2022-06-30 PROCEDURE — 4010F PR ACE/ARB THEARPY RXD/TAKEN: ICD-10-PCS | Mod: CPTII,95,, | Performed by: NURSE PRACTITIONER

## 2022-06-30 PROCEDURE — 99214 PR OFFICE/OUTPT VISIT, EST, LEVL IV, 30-39 MIN: ICD-10-PCS | Mod: 95,CR,, | Performed by: NURSE PRACTITIONER

## 2022-06-30 PROCEDURE — 99214 OFFICE O/P EST MOD 30 MIN: CPT | Mod: 95,CR,, | Performed by: NURSE PRACTITIONER

## 2022-06-30 NOTE — PROGRESS NOTES
"The patient location is:[place]  The chief complaint leading to consultation is: nightmares  Visit type: TELE AUDIOVISUAL:11902    Face to Face time with patient: 10minutes of total time spent on the encounter, which includes face to face time and non-face to face time preparing to see the patient (eg, review of tests), Obtaining and/or reviewing separately obtained history, Documenting clinical information in the electronic or other health record, Independently interpreting results (not separately reported) and communicating results to the patient/family/caregiver, or Care coordination (not separately reported). Each patient to whom he or she provides medical services by telemedicine is:  (1) informed of the relationship between the physician and patient and the respective role of any other health care provider with respect to management of the patient; and (2) notified that he or she may decline to receive medical services by telemedicine and may withdraw from such care at any time.      Her nightmares continue to be less frequent/stable on prazosin 2mg 2 tabs qhs and Gabapentin 300mg (also helps back pain/orth had her taking up to TID, now BID) Had left hip replacement.some night sweats/adjust temp in house. Sleeping better since cat not on her chest anymore. Did HST but invalid/defers repeating it.    HISTORY  1/13/15 vb:  She was last seen June 2013. Since last seen, she reports having less frequent nightmares with same re-occuring themes though, "content the same but more manageable". No longer awakening throughout the night screaming or punching. She was leary about starting Prazosin at last visit because her cat was also on it for UTI. She just began taking it ~ 7 weeks ago, 1mg qhs. Denies headache, dizziness, or low BP episodes. Previously awoke up to 2-7 times each night and it could take sometimes 45 minutes to fall back asleep. Sleep disruption and nightmares began in childhood but had progressed " "substantially in 2013. She quit a job she disliked in the interim, is "doing better". She researched image Rehearsal Therapy and continues to work on it, but no individual counseling not begun after last seen due to lack of insurance after quitting job. 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 ESS=8    Denies symptoms of RLS     PSG: (172#) AHI was 2.9 with an oxygen srinivas of 90.0%. The supine AHI was 2.9 and the REM AHI was 9.2. Sufficient REM and supine sleep was seen. No significant limb movements of NREM sleep noted. No activity during REM sleep was recorded. Normal REM atonia was observed. Following each major REM period, the patient awakened and a clear posterior dominant rhythm was identified on EEG. Shortly after awakening, the patient was noted to be shuffling and kicking her legs underneath the covers for about 10-15 seconds. The first episode was accompanied by leg thrashing and crying out. No significant change in heart rate accompanied the events. The patient returned to sleep after each episode after a 5-10 minute period of wakefulness. Soft rare snoring. Prolonged sleep latency night of study.    10/26/16 vb: She increased her prazosin from 2mg to 3mg then 4mg the last 3mos. This has helped improve the nature of her nightmares, they are less disturbed. She will be pursuing EMDR to help. Talk therapy not helpful. She uses image rehearsal to help fall asleep w/o incident. Denies syncope, dizziness, headache with higher dose. Stable weight. BP well controlled. ESS=9. Denies interval medical change.     8/21/19 vb:   Continues to take 2mg prazosin 2 tab qhs, helps lessen intensity and violence of her nightmares. Denies dizziness, near syncope, lightheaded. Sleep still bit fragmented, she attributes to neck DDD (traction helps) and sciatica. 8# gain. Denies snoring or air gasping. +daytime tiredness. BP stable , sees  PCP.     9/25/20: continues to take prazosin 2mg 2 tab qhs. Few nightmares/bad " nights but more with current situational issues. Othewise good. Rises slowly from sleep to avoid postural hypotension. Gabapentin 300mg qhs helping back pain. Working virtually, cats at home. Bp stable    FH: Her father has a history of sleep walking and sleep eating      ASSESSMENT:   Nightmare disorder, NEC--stable  PTSD per history, no longer attending therapy or on SSRI  HTN  Cervical and lumbar DDD, stable on aki    PLAN:   1. Continue Prazosin 2mg 2 tab qhs. This medication has been found to be effective in management of nightmares exhibied in patients with PTSD as well as idiopathic nightmares.   2. Continue 300mg gabapentin po qhs (ortho or pcp RX currently). See PCP as advised re: HTN mgt/continue meds  rtc annually, SOONER if needed

## 2022-07-08 ENCOUNTER — CLINICAL SUPPORT (OUTPATIENT)
Dept: OTHER | Facility: CLINIC | Age: 52
End: 2022-07-08
Payer: COMMERCIAL

## 2022-07-08 DIAGNOSIS — Z00.8 ENCOUNTER FOR OTHER GENERAL EXAMINATION: ICD-10-CM

## 2022-07-09 VITALS
HEIGHT: 66 IN | BODY MASS INDEX: 28.93 KG/M2 | WEIGHT: 180 LBS | SYSTOLIC BLOOD PRESSURE: 136 MMHG | DIASTOLIC BLOOD PRESSURE: 84 MMHG

## 2022-07-09 LAB
GLUCOSE SERPL-MCNC: 109 MG/DL (ref 60–140)
HDLC SERPL-MCNC: 39 MG/DL
POC CHOLESTEROL, LDL (DOCK): 201.38 MG/DL
POC CHOLESTEROL, TOTAL: 332 MG/DL
TRIGL SERPL-MCNC: 438 MG/DL

## 2022-07-25 ENCOUNTER — TELEPHONE (OUTPATIENT)
Dept: OTHER | Facility: CLINIC | Age: 52
End: 2022-07-25
Payer: COMMERCIAL

## 2022-07-25 DIAGNOSIS — M10.9 GOUT, UNSPECIFIED CAUSE, UNSPECIFIED CHRONICITY, UNSPECIFIED SITE: ICD-10-CM

## 2022-07-25 NOTE — TELEPHONE ENCOUNTER
Care Due:                  Date            Visit Type   Department     Provider  --------------------------------------------------------------------------------                                EP -                              PRIMARY      [facility] INTERNAL  Last Visit: 09-      CARE ([facility])   MEDICINE       Kristen Abreu                              MYCHART                              ANNUAL                              CHECKUP/PHY  [facility] INTERNAL  Next Visit: 08-      S            [facility]       Kristen Abreu                                                            Last  Test          Frequency    Reason                     Performed    Due Date  --------------------------------------------------------------------------------    CBC.........  12 months..  allopurinoL..............  07- 06-    CMP.........  12 months..  allopurinoL..............  07- 06-    Uric Acid...  12 months..  allopurinoL..............  07- 06-    [employer] Embedded Care Gaps. Reference number: [id number]. 7/25/2022   12:14:42 PM CDT

## 2022-07-25 NOTE — TELEPHONE ENCOUNTER
#1 attempt to contact patient on behalf of [employer] to discuss test results.. No answer. Left message. Will send E-mail with further instructions regarding abnormal results.

## 2022-07-25 NOTE — TELEPHONE ENCOUNTER
Allergies reviewed    LOV 9-28-21  Next office visit 8-17-22    Allopurinol at [facility] on [address]    Pended and routed

## 2022-07-26 RX ORDER — ALLOPURINOL 300 MG/1
300 TABLET ORAL DAILY
Qty: 30 TABLET | Refills: 0 | Status: SHIPPED | OUTPATIENT
Start: 2022-07-26 | End: 2022-08-26 | Stop reason: SDUPTHER

## 2022-08-01 ENCOUNTER — PATIENT MESSAGE (OUTPATIENT)
Dept: OPTOMETRY | Facility: CLINIC | Age: 52
End: 2022-08-01
Payer: COMMERCIAL

## 2022-08-15 ENCOUNTER — PATIENT OUTREACH (OUTPATIENT)
Dept: ADMINISTRATIVE | Facility: HOSPITAL | Age: 52
End: 2022-08-15
Payer: COMMERCIAL

## 2022-08-17 ENCOUNTER — LAB VISIT (OUTPATIENT)
Dept: LAB | Facility: OTHER | Age: 52
End: 2022-08-17
Attending: INTERNAL MEDICINE
Payer: COMMERCIAL

## 2022-08-17 ENCOUNTER — OFFICE VISIT (OUTPATIENT)
Dept: INTERNAL MEDICINE | Facility: CLINIC | Age: 52
End: 2022-08-17
Attending: INTERNAL MEDICINE
Payer: COMMERCIAL

## 2022-08-17 VITALS
OXYGEN SATURATION: 97 % | BODY MASS INDEX: 28.7 KG/M2 | WEIGHT: 178.56 LBS | SYSTOLIC BLOOD PRESSURE: 122 MMHG | DIASTOLIC BLOOD PRESSURE: 82 MMHG | HEIGHT: 66 IN | HEART RATE: 74 BPM

## 2022-08-17 DIAGNOSIS — M10.00 IDIOPATHIC GOUT, UNSPECIFIED CHRONICITY, UNSPECIFIED SITE: ICD-10-CM

## 2022-08-17 DIAGNOSIS — R20.0 BILATERAL HAND NUMBNESS: ICD-10-CM

## 2022-08-17 DIAGNOSIS — N95.1 PERIMENOPAUSAL: ICD-10-CM

## 2022-08-17 DIAGNOSIS — M75.41 IMPINGEMENT SYNDROME OF RIGHT SHOULDER REGION: ICD-10-CM

## 2022-08-17 DIAGNOSIS — Z00.00 ANNUAL PHYSICAL EXAM: Primary | ICD-10-CM

## 2022-08-17 DIAGNOSIS — F51.5 NIGHTMARE DISORDER: ICD-10-CM

## 2022-08-17 DIAGNOSIS — Z12.31 ENCOUNTER FOR SCREENING MAMMOGRAM FOR MALIGNANT NEOPLASM OF BREAST: ICD-10-CM

## 2022-08-17 DIAGNOSIS — E78.5 HYPERLIPIDEMIA, UNSPECIFIED HYPERLIPIDEMIA TYPE: ICD-10-CM

## 2022-08-17 DIAGNOSIS — Z00.00 ANNUAL PHYSICAL EXAM: ICD-10-CM

## 2022-08-17 DIAGNOSIS — I10 HYPERTENSION, UNSPECIFIED TYPE: ICD-10-CM

## 2022-08-17 DIAGNOSIS — Z12.11 SCREENING FOR COLON CANCER: ICD-10-CM

## 2022-08-17 LAB
25(OH)D3+25(OH)D2 SERPL-MCNC: 38 NG/ML (ref 30–96)
ALBUMIN SERPL BCP-MCNC: 4.1 G/DL (ref 3.5–5.2)
ALP SERPL-CCNC: 84 U/L (ref 55–135)
ALT SERPL W/O P-5'-P-CCNC: 20 U/L (ref 10–44)
ANION GAP SERPL CALC-SCNC: 10 MMOL/L (ref 8–16)
AST SERPL-CCNC: 15 U/L (ref 10–40)
BASOPHILS # BLD AUTO: 0.03 K/UL (ref 0–0.2)
BASOPHILS NFR BLD: 0.7 % (ref 0–1.9)
BILIRUB SERPL-MCNC: 0.4 MG/DL (ref 0.1–1)
BUN SERPL-MCNC: 12 MG/DL (ref 6–20)
CALCIUM SERPL-MCNC: 9.5 MG/DL (ref 8.7–10.5)
CHLORIDE SERPL-SCNC: 107 MMOL/L (ref 95–110)
CHOLEST SERPL-MCNC: 279 MG/DL (ref 120–199)
CHOLEST/HDLC SERPL: 5.3 {RATIO} (ref 2–5)
CO2 SERPL-SCNC: 26 MMOL/L (ref 23–29)
CREAT SERPL-MCNC: 0.8 MG/DL (ref 0.5–1.4)
DIFFERENTIAL METHOD: ABNORMAL
EOSINOPHIL # BLD AUTO: 0.1 K/UL (ref 0–0.5)
EOSINOPHIL NFR BLD: 2.9 % (ref 0–8)
ERYTHROCYTE [DISTWIDTH] IN BLOOD BY AUTOMATED COUNT: 13.4 % (ref 11.5–14.5)
EST. GFR  (NO RACE VARIABLE): >60 ML/MIN/1.73 M^2
ESTIMATED AVG GLUCOSE: 123 MG/DL (ref 68–131)
ESTRADIOL SERPL-MCNC: 100 PG/ML
FSH SERPL-ACNC: 34.09 MIU/ML
GLUCOSE SERPL-MCNC: 114 MG/DL (ref 70–110)
HBA1C MFR BLD: 5.9 % (ref 4–5.6)
HCT VFR BLD AUTO: 38.4 % (ref 37–48.5)
HDLC SERPL-MCNC: 53 MG/DL (ref 40–75)
HDLC SERPL: 19 % (ref 20–50)
HGB BLD-MCNC: 12.6 G/DL (ref 12–16)
IMM GRANULOCYTES # BLD AUTO: 0.01 K/UL (ref 0–0.04)
IMM GRANULOCYTES NFR BLD AUTO: 0.2 % (ref 0–0.5)
LDLC SERPL CALC-MCNC: 176.4 MG/DL (ref 63–159)
LYMPHOCYTES # BLD AUTO: 1.4 K/UL (ref 1–4.8)
LYMPHOCYTES NFR BLD: 30.6 % (ref 18–48)
MCH RBC QN AUTO: 28.4 PG (ref 27–31)
MCHC RBC AUTO-ENTMCNC: 32.8 G/DL (ref 32–36)
MCV RBC AUTO: 87 FL (ref 82–98)
MONOCYTES # BLD AUTO: 0.3 K/UL (ref 0.3–1)
MONOCYTES NFR BLD: 7.4 % (ref 4–15)
NEUTROPHILS # BLD AUTO: 2.6 K/UL (ref 1.8–7.7)
NEUTROPHILS NFR BLD: 58.2 % (ref 38–73)
NONHDLC SERPL-MCNC: 226 MG/DL
NRBC BLD-RTO: 0 /100 WBC
PLATELET # BLD AUTO: 259 K/UL (ref 150–450)
PMV BLD AUTO: 8.9 FL (ref 9.2–12.9)
POTASSIUM SERPL-SCNC: 4.3 MMOL/L (ref 3.5–5.1)
PROT SERPL-MCNC: 6.8 G/DL (ref 6–8.4)
RBC # BLD AUTO: 4.43 M/UL (ref 4–5.4)
SODIUM SERPL-SCNC: 143 MMOL/L (ref 136–145)
TRIGL SERPL-MCNC: 248 MG/DL (ref 30–150)
TSH SERPL DL<=0.005 MIU/L-ACNC: 1.51 UIU/ML (ref 0.4–4)
URATE SERPL-MCNC: 4 MG/DL (ref 2.4–5.7)
VIT B12 SERPL-MCNC: 512 PG/ML (ref 210–950)
WBC # BLD AUTO: 4.45 K/UL (ref 3.9–12.7)

## 2022-08-17 PROCEDURE — 1160F RVW MEDS BY RX/DR IN RCRD: CPT | Mod: CPTII,S$GLB,, | Performed by: INTERNAL MEDICINE

## 2022-08-17 PROCEDURE — 84252 ASSAY OF VITAMIN B-2: CPT | Performed by: INTERNAL MEDICINE

## 2022-08-17 PROCEDURE — 99396 PR PREVENTIVE VISIT,EST,40-64: ICD-10-PCS | Mod: S$GLB,,, | Performed by: INTERNAL MEDICINE

## 2022-08-17 PROCEDURE — 1160F PR REVIEW ALL MEDS BY PRESCRIBER/CLIN PHARMACIST DOCUMENTED: ICD-10-PCS | Mod: CPTII,S$GLB,, | Performed by: INTERNAL MEDICINE

## 2022-08-17 PROCEDURE — 99396 PREV VISIT EST AGE 40-64: CPT | Mod: S$GLB,,, | Performed by: INTERNAL MEDICINE

## 2022-08-17 PROCEDURE — 84165 PROTEIN E-PHORESIS SERUM: CPT | Performed by: INTERNAL MEDICINE

## 2022-08-17 PROCEDURE — 80053 COMPREHEN METABOLIC PANEL: CPT | Performed by: INTERNAL MEDICINE

## 2022-08-17 PROCEDURE — 84425 ASSAY OF VITAMIN B-1: CPT | Performed by: INTERNAL MEDICINE

## 2022-08-17 PROCEDURE — 3079F PR MOST RECENT DIASTOLIC BLOOD PRESSURE 80-89 MM HG: ICD-10-PCS | Mod: CPTII,S$GLB,, | Performed by: INTERNAL MEDICINE

## 2022-08-17 PROCEDURE — 85025 COMPLETE CBC W/AUTO DIFF WBC: CPT | Performed by: INTERNAL MEDICINE

## 2022-08-17 PROCEDURE — 82306 VITAMIN D 25 HYDROXY: CPT | Performed by: INTERNAL MEDICINE

## 2022-08-17 PROCEDURE — 1159F MED LIST DOCD IN RCRD: CPT | Mod: CPTII,S$GLB,, | Performed by: INTERNAL MEDICINE

## 2022-08-17 PROCEDURE — 1159F PR MEDICATION LIST DOCUMENTED IN MEDICAL RECORD: ICD-10-PCS | Mod: CPTII,S$GLB,, | Performed by: INTERNAL MEDICINE

## 2022-08-17 PROCEDURE — 80061 LIPID PANEL: CPT | Performed by: INTERNAL MEDICINE

## 2022-08-17 PROCEDURE — 86334 IMMUNOFIX E-PHORESIS SERUM: CPT | Performed by: INTERNAL MEDICINE

## 2022-08-17 PROCEDURE — 3008F BODY MASS INDEX DOCD: CPT | Mod: CPTII,S$GLB,, | Performed by: INTERNAL MEDICINE

## 2022-08-17 PROCEDURE — 83001 ASSAY OF GONADOTROPIN (FSH): CPT | Performed by: INTERNAL MEDICINE

## 2022-08-17 PROCEDURE — 4010F ACE/ARB THERAPY RXD/TAKEN: CPT | Mod: CPTII,S$GLB,, | Performed by: INTERNAL MEDICINE

## 2022-08-17 PROCEDURE — 84550 ASSAY OF BLOOD/URIC ACID: CPT | Performed by: INTERNAL MEDICINE

## 2022-08-17 PROCEDURE — 82607 VITAMIN B-12: CPT | Performed by: INTERNAL MEDICINE

## 2022-08-17 PROCEDURE — 36415 COLL VENOUS BLD VENIPUNCTURE: CPT | Performed by: INTERNAL MEDICINE

## 2022-08-17 PROCEDURE — 3008F PR BODY MASS INDEX (BMI) DOCUMENTED: ICD-10-PCS | Mod: CPTII,S$GLB,, | Performed by: INTERNAL MEDICINE

## 2022-08-17 PROCEDURE — 4010F PR ACE/ARB THEARPY RXD/TAKEN: ICD-10-PCS | Mod: CPTII,S$GLB,, | Performed by: INTERNAL MEDICINE

## 2022-08-17 PROCEDURE — 3074F PR MOST RECENT SYSTOLIC BLOOD PRESSURE < 130 MM HG: ICD-10-PCS | Mod: CPTII,S$GLB,, | Performed by: INTERNAL MEDICINE

## 2022-08-17 PROCEDURE — 84207 ASSAY OF VITAMIN B-6: CPT | Performed by: INTERNAL MEDICINE

## 2022-08-17 PROCEDURE — 3079F DIAST BP 80-89 MM HG: CPT | Mod: CPTII,S$GLB,, | Performed by: INTERNAL MEDICINE

## 2022-08-17 PROCEDURE — 84165 PATHOLOGIST INTERPRETATION SPE: ICD-10-PCS | Mod: 26,,, | Performed by: PATHOLOGY

## 2022-08-17 PROCEDURE — 86334 PATHOLOGIST INTERPRETATION IFE: ICD-10-PCS | Mod: 26,,, | Performed by: PATHOLOGY

## 2022-08-17 PROCEDURE — 84165 PROTEIN E-PHORESIS SERUM: CPT | Mod: 26,,, | Performed by: PATHOLOGY

## 2022-08-17 PROCEDURE — 86334 IMMUNOFIX E-PHORESIS SERUM: CPT | Mod: 26,,, | Performed by: PATHOLOGY

## 2022-08-17 PROCEDURE — 83036 HEMOGLOBIN GLYCOSYLATED A1C: CPT | Performed by: INTERNAL MEDICINE

## 2022-08-17 PROCEDURE — 99999 PR PBB SHADOW E&M-EST. PATIENT-LVL IV: CPT | Mod: PBBFAC,,, | Performed by: INTERNAL MEDICINE

## 2022-08-17 PROCEDURE — 99999 PR PBB SHADOW E&M-EST. PATIENT-LVL IV: ICD-10-PCS | Mod: PBBFAC,,, | Performed by: INTERNAL MEDICINE

## 2022-08-17 PROCEDURE — 3074F SYST BP LT 130 MM HG: CPT | Mod: CPTII,S$GLB,, | Performed by: INTERNAL MEDICINE

## 2022-08-17 PROCEDURE — 84443 ASSAY THYROID STIM HORMONE: CPT | Performed by: INTERNAL MEDICINE

## 2022-08-17 PROCEDURE — 82670 ASSAY OF TOTAL ESTRADIOL: CPT | Performed by: INTERNAL MEDICINE

## 2022-08-17 RX ORDER — GABAPENTIN 300 MG/1
300 CAPSULE ORAL 2 TIMES DAILY
Qty: 180 CAPSULE | Refills: 0 | Status: SHIPPED | OUTPATIENT
Start: 2022-08-17 | End: 2022-12-11 | Stop reason: SDUPTHER

## 2022-08-17 RX ORDER — ACETAMINOPHEN 160 MG/5ML
200 SUSPENSION, ORAL (FINAL DOSE FORM) ORAL DAILY
Qty: 90 CAPSULE | Refills: 3 | COMMUNITY
Start: 2022-08-17 | End: 2023-12-05

## 2022-08-17 RX ORDER — ROSUVASTATIN CALCIUM 20 MG/1
20 TABLET, COATED ORAL DAILY
Qty: 90 TABLET | Refills: 3 | Status: SHIPPED | OUTPATIENT
Start: 2022-08-17 | End: 2023-06-22

## 2022-08-17 NOTE — PROGRESS NOTES
Subjective:   Patient ID: Lashae Colvin is a 52 y.o. female  Chief complaint:   Chief Complaint   Patient presents with    Annual Exam       HPI     Here for annual exam     Had covid this year in May 2022   Having intermittent periods over the past year - last was in March     Had nonfasting labs -  Glu and flp through [employer] [facility] and ldl 201  nonfasting glu 109  Inc exercise since hip surgery     Healed from hip surgery   Taking gpn 300mg nightly  Was taking tid after surgery and too much and now at 300mg bid for sciatica in afternoon   100mg in am made her too tired during the day     Right shoulder pain - pain with putting on bra or abduction   Left shoulder xray unremarakble in past   Having B neck tightness and pain at times and radiates down B arms right > left with numbness down to fingers at times   - works long hours in front of computer     HLD:  - reviewed and amenable to trial of crestor   - declined statin in past 2015 and addressed through lifestyle     HTN:   - controlled on lisinopril - occ cough with no trigger and c/f se of acei but does not want to change bp med   - checked bp at home 110s/70s  - has 2 bp cuffs and they are consistent   - exercising  - gpn helpful for sleep    Gout:    - no recent flares   - Previously followed by rheum for gout   - taking allopurinal 300mg once daily and well controlled  - no flares over > 2-3 years   - UA level was at goal upon last check     Nightmare disorder:   - Taking prazosin  - followed by sleep   - sx stable     Has chronic intermittent right > left lower back pain with sciatica present for > 3-4 years  Today: sciatica is managable but still bothersome and present   - taking gpn as above   - had MRI 6/2021  - tried ibuprofen with pepcid and mobic - mobic helpful   - will take mm relaxer prn   Previously:    - exercising and still with pain - sciatica bilaterally now R > L  - attended PT for hip in past and cervical spine    - same features  "as below when reported at [facility] but now daily and constant    - tried pilates, PT stretches, ibuprofen 800mg, naprosyn 2 otc, methocarbamol, flexeril    - taking gpn 300mg at night and helpful   - no weakness or incontinence   - still with numbness in 2nd digit of bilateral feet   - worse with sitting or bending  + mm spasms with this   Previously:   -  pain improved previously with inc exercise and stretching, weights and pilates  - having good and bad days     - radiates down post leg and lateral aspect   - numbness in 2nd digit of foot Bilaterally   - never attended pt for lower back   - no weakness or incontinence   - tried gpn and intermittently helpful      Gyn:  - Periods are intermittent over past year   - utd with gyn     Vit d def:   Level 39 <-19 <- 25   Taking calcium with vit d ? Dose  Taking calcium and b complex     HM:  cscope - declines and already has kit at home to complete   mmg   shingrix   Flu vaccine  Finishing twinrix series     Review of Systems      Objective:  Vitals:    08/17/22 1006   BP: 122/82   BP Location: Left arm   Patient Position: Sitting   Pulse: 74   SpO2: 97%   Weight: 81 kg (178 lb 9.2 oz)   Height: 5' 6" (1.676 m)     Body mass index is 28.82 kg/m².    Physical Exam  Vitals reviewed.   Constitutional:       Appearance: Normal appearance. She is well-developed.   HENT:      Head: Normocephalic and atraumatic.      Right Ear: Tympanic membrane, ear canal and external ear normal.      Left Ear: Tympanic membrane, ear canal and external ear normal.      Nose: Nose normal. No congestion.      Comments: Wearing mask      Mouth/Throat:      Mouth: Mucous membranes are moist.      Pharynx: Oropharynx is clear. No oropharyngeal exudate.   Eyes:      Extraocular Movements: Extraocular movements intact.      Conjunctiva/sclera: Conjunctivae normal.   Neck:      Thyroid: No thyromegaly.   Cardiovascular:      Rate and Rhythm: Normal rate and regular rhythm.      Pulses: Normal pulses.     "  Heart sounds: Normal heart sounds.   Pulmonary:      Effort: Pulmonary effort is normal.      Breath sounds: Normal breath sounds.   Abdominal:      General: Bowel sounds are normal.      Palpations: Abdomen is soft.   Musculoskeletal:         General: Tenderness present. No swelling. Normal range of motion.      Cervical back: Neck supple.      Comments: ttp at right > left buttocks at piriformis   + ttp of right biceps tendon at bicipital groove   Pain with abduction and extension    Lymphadenopathy:      Cervical: No cervical adenopathy.   Skin:     General: Skin is warm and dry.      Capillary Refill: Capillary refill takes less than 2 seconds.   Neurological:      General: No focal deficit present.      Mental Status: She is alert and oriented to person, place, and time. Mental status is at baseline.      Sensory: Sensation is intact.      Motor: Motor function is intact. No tremor or atrophy.      Coordination: Coordination is intact.      Deep Tendon Reflexes:      Reflex Scores:       Patellar reflexes are 2+ on the right side and 2+ on the left side.       Achilles reflexes are 2+ on the right side and 2+ on the left side.     Comments: Strength 5/5 B UE and LE and sensation to light touch in tact in all extremities   No spinal ttp   + tight trap mm B    Psychiatric:         Behavior: Behavior normal.         Thought Content: Thought content normal.         Assessment:  1. Annual physical exam    2. Encounter for screening mammogram for malignant neoplasm of breast    3. Screening for colon cancer    4. Idiopathic gout, unspecified chronicity, unspecified site    5. Hyperlipidemia, unspecified hyperlipidemia type    6. Hypertension, unspecified type    7. Nightmare disorder    8. Bilateral hand numbness    9. Impingement syndrome of right shoulder region    10. Perimenopausal        Plan:  Lashae was seen today for annual exam.    Diagnoses and all orders for this visit:    Annual physical exam  -      Vitamin D; Future  -     Hemoglobin A1C; Future  -     TSH; Future  -     Lipid Panel; Future  -     CBC Auto Differential; Future  -     Comprehensive Metabolic Panel; Future    Encounter for screening mammogram for malignant neoplasm of breast  -     Mammo Digital Screening Bilat w/ Prakash; Future    Screening for colon cancer  -     Cancel: Ambulatory referral/consult to Endo Procedure ; Future    Idiopathic gout, unspecified chronicity, unspecified site  -     URIC ACID; Future    Hyperlipidemia, unspecified hyperlipidemia type  -     rosuvastatin (CRESTOR) 20 MG tablet; Take 1 tablet (20 mg total) by mouth once daily.  -     coenzyme Q10 200 mg capsule; Take 200 mg by mouth once daily.    Hypertension, unspecified type    Nightmare disorder  -     gabapentin (NEURONTIN) 300 MG capsule; Take 1 capsule (300 mg total) by mouth 2 (two) times daily.    Bilateral hand numbness  -     X-Ray Cervical Spine Complete 5 view; Future  -     Vitamin B12; Future  -     Vitamin B1; Future  -     Vitamin B2; Future  -     Vitamin B6; Future  -     Heavy Metals Screen, Blood (Quantitative); Future  -     Immunofixation Electrophoresis; Future  -     Protein Electrophoresis, Serum; Future  -     EMG W/ ULTRASOUND AND NERVE CONDUCTION TEST 2 Extremities; Future    Impingement syndrome of right shoulder region  -     X-ray Shoulder 2 or More Views Right; Future    Perimenopausal  -     ESTRADIOL; Future  -     FOLLICLE STIMULATING HORMONE; Future      Today:   Will consider ortho referal in future for shoulder but will complete work up as above   Check xrays   Will proced with emg - pending those results will consider mri of neck if cause is attrib to the spine   Will consider pain mmt referral for sciatica and will let me know if would like to proceed   Left bicipital tendonitis - will let me know if will start pt for this in future - defers ortho and steroid injection for now   voltaren gel prn     Health Maintenance    Topic Date Due    Mammogram  09/16/2021    Lipid Panel  07/08/2027    TETANUS VACCINE  11/13/2029    Hepatitis C Screening  Completed

## 2022-08-18 ENCOUNTER — TELEPHONE (OUTPATIENT)
Dept: INTERNAL MEDICINE | Facility: CLINIC | Age: 52
End: 2022-08-18
Payer: COMMERCIAL

## 2022-08-18 ENCOUNTER — HOSPITAL ENCOUNTER (OUTPATIENT)
Dept: RADIOLOGY | Facility: OTHER | Age: 52
Discharge: HOME | End: 2022-08-18
Attending: INTERNAL MEDICINE
Payer: COMMERCIAL

## 2022-08-18 DIAGNOSIS — M75.41 IMPINGEMENT SYNDROME OF RIGHT SHOULDER REGION: ICD-10-CM

## 2022-08-18 DIAGNOSIS — R20.0 BILATERAL HAND NUMBNESS: Primary | ICD-10-CM

## 2022-08-18 LAB
ALBUMIN SERPL ELPH-MCNC: 4.3 G/DL (ref 3.35–5.55)
ALPHA1 GLOB SERPL ELPH-MCNC: 0.24 G/DL (ref 0.17–0.41)
ALPHA2 GLOB SERPL ELPH-MCNC: 0.54 G/DL (ref 0.43–0.99)
B-GLOBULIN SERPL ELPH-MCNC: 0.7 G/DL (ref 0.5–1.1)
GAMMA GLOB SERPL ELPH-MCNC: 0.63 G/DL (ref 0.67–1.58)
INTERPRETATION SERPL IFE-IMP: NORMAL
PROT SERPL-MCNC: 6.4 G/DL (ref 6–8.4)

## 2022-08-18 PROCEDURE — 73030 XR SHOULDER COMPLETE 2 OR MORE VIEWS RIGHT: ICD-10-PCS | Mod: 26,RT,, | Performed by: RADIOLOGY

## 2022-08-18 PROCEDURE — 73030 X-RAY EXAM OF SHOULDER: CPT | Mod: TC,FY,RT

## 2022-08-18 PROCEDURE — 73030 X-RAY EXAM OF SHOULDER: CPT | Mod: 26,RT,, | Performed by: RADIOLOGY

## 2022-08-18 NOTE — TELEPHONE ENCOUNTER
----- Message from Tomaschamp Giordano sent at 8/18/2022  3:08 PM CDT -----  Regarding: Specimen Issue  /There was an issue with the Heavy Metals Screen test ordered on this patient from 8    Improper collection. The sendout lab received a red top tube. This test requires a navy blue(EDTA) tube. The order has been cancelled. You will need to reorder and contact the patient for recollection if clinically indicated.    If there are any questions, please call the Sendout lab at [phone] ext. [phone].  Anyone in the Sendout lab will be able to assist you.

## 2022-08-19 LAB
PATHOLOGIST INTERPRETATION IFE: NORMAL
PATHOLOGIST INTERPRETATION SPE: NORMAL

## 2022-08-20 LAB — VIT B2 SERPL-MCNC: 5 MCG/L (ref 1–19)

## 2022-08-23 LAB
PYRIDOXAL SERPL-MCNC: 21 UG/L (ref 5–50)
VIT B1 BLD-MCNC: 42 UG/L (ref 38–122)

## 2022-08-31 ENCOUNTER — HOSPITAL ENCOUNTER (OUTPATIENT)
Dept: RADIOLOGY | Facility: OTHER | Age: 52
Discharge: HOME | End: 2022-08-31
Attending: INTERNAL MEDICINE
Payer: COMMERCIAL

## 2022-08-31 DIAGNOSIS — R20.0 BILATERAL HAND NUMBNESS: ICD-10-CM

## 2022-08-31 PROCEDURE — 72050 X-RAY EXAM NECK SPINE 4/5VWS: CPT | Mod: TC,FY

## 2022-08-31 PROCEDURE — 72050 X-RAY EXAM NECK SPINE 4/5VWS: CPT | Mod: 26,,, | Performed by: RADIOLOGY

## 2022-08-31 PROCEDURE — 72050 XR CERVICAL SPINE COMPLETE 5 VIEW: ICD-10-PCS | Mod: 26,,, | Performed by: RADIOLOGY

## 2022-09-08 LAB — HEMOCCULT STL QL IA: NEGATIVE

## 2022-09-29 ENCOUNTER — PATIENT MESSAGE (OUTPATIENT)
Dept: INTERNAL MEDICINE | Facility: CLINIC | Age: 52
End: 2022-09-29
Payer: COMMERCIAL

## 2022-09-29 DIAGNOSIS — R20.0 BILATERAL HAND NUMBNESS: Primary | ICD-10-CM

## 2022-11-30 ENCOUNTER — PATIENT MESSAGE (OUTPATIENT)
Dept: INTERNAL MEDICINE | Facility: CLINIC | Age: 52
End: 2022-11-30
Payer: COMMERCIAL

## 2022-12-01 ENCOUNTER — HOSPITAL ENCOUNTER (OUTPATIENT)
Dept: RADIOLOGY | Facility: HOSPITAL | Age: 52
Discharge: HOME | End: 2022-12-01
Attending: INTERNAL MEDICINE
Payer: COMMERCIAL

## 2022-12-01 DIAGNOSIS — Z12.31 ENCOUNTER FOR SCREENING MAMMOGRAM FOR MALIGNANT NEOPLASM OF BREAST: ICD-10-CM

## 2022-12-01 PROCEDURE — 77063 BREAST TOMOSYNTHESIS BI: CPT | Mod: TC

## 2022-12-01 PROCEDURE — 77063 BREAST TOMOSYNTHESIS BI: CPT | Mod: 26,,, | Performed by: RADIOLOGY

## 2022-12-01 PROCEDURE — 77067 SCR MAMMO BI INCL CAD: CPT | Mod: 26,,, | Performed by: RADIOLOGY

## 2022-12-01 PROCEDURE — 77067 MAMMO DIGITAL SCREENING BILAT WITH TOMO: ICD-10-PCS | Mod: 26,,, | Performed by: RADIOLOGY

## 2022-12-01 PROCEDURE — 77063 MAMMO DIGITAL SCREENING BILAT WITH TOMO: ICD-10-PCS | Mod: 26,,, | Performed by: RADIOLOGY

## 2022-12-06 ENCOUNTER — PROCEDURE VISIT (OUTPATIENT)
Dept: NEUROLOGY | Facility: CLINIC | Age: 52
End: 2022-12-06
Payer: COMMERCIAL

## 2022-12-06 DIAGNOSIS — R20.0 BILATERAL HAND NUMBNESS: ICD-10-CM

## 2022-12-06 PROCEDURE — 95913 NRV CNDJ TEST 13/> STUDIES: CPT | Mod: S$GLB,,, | Performed by: PSYCHIATRY & NEUROLOGY

## 2022-12-06 PROCEDURE — 95886 PR EMG COMPLETE, W/ NERVE CONDUCTION STUDIES, 5+ MUSCLES: ICD-10-PCS | Mod: S$GLB,,, | Performed by: PSYCHIATRY & NEUROLOGY

## 2022-12-06 PROCEDURE — 95913 PR NERVE CONDUCTION STUDY; 13 OR MORE STUDIES: ICD-10-PCS | Mod: S$GLB,,, | Performed by: PSYCHIATRY & NEUROLOGY

## 2022-12-06 PROCEDURE — 95886 MUSC TEST DONE W/N TEST COMP: CPT | Mod: S$GLB,,, | Performed by: PSYCHIATRY & NEUROLOGY

## 2022-12-11 ENCOUNTER — PATIENT MESSAGE (OUTPATIENT)
Dept: INTERNAL MEDICINE | Facility: CLINIC | Age: 52
End: 2022-12-11
Payer: COMMERCIAL

## 2022-12-11 DIAGNOSIS — E78.5 HYPERLIPIDEMIA, UNSPECIFIED HYPERLIPIDEMIA TYPE: Primary | ICD-10-CM

## 2022-12-11 DIAGNOSIS — F51.5 NIGHTMARE DISORDER: ICD-10-CM

## 2022-12-11 NOTE — TELEPHONE ENCOUNTER
No new care gaps identified.  [facility] Embedded Care Gaps. Reference number: [id number]. 12/11/2022   12:56:23 PM CST

## 2022-12-12 RX ORDER — GABAPENTIN 300 MG/1
300 CAPSULE ORAL 2 TIMES DAILY
Qty: 180 CAPSULE | Refills: 0 | Status: SHIPPED | OUTPATIENT
Start: 2022-12-12 | End: 2023-08-21 | Stop reason: SDUPTHER

## 2022-12-12 NOTE — TELEPHONE ENCOUNTER
Please offer her a sooner appt with a provider with availability to evaluate the swelling in her arm     Signed orders to follow up on cholesterol - please arrange at her convenience

## 2022-12-29 ENCOUNTER — PATIENT MESSAGE (OUTPATIENT)
Dept: INTERNAL MEDICINE | Facility: CLINIC | Age: 52
End: 2022-12-29
Payer: COMMERCIAL

## 2023-01-05 ENCOUNTER — OFFICE VISIT (OUTPATIENT)
Dept: INTERNAL MEDICINE | Facility: CLINIC | Age: 53
End: 2023-01-05
Attending: INTERNAL MEDICINE
Payer: COMMERCIAL

## 2023-01-05 DIAGNOSIS — M54.2 NECK PAIN: Primary | ICD-10-CM

## 2023-01-05 DIAGNOSIS — R73.01 IFG (IMPAIRED FASTING GLUCOSE): ICD-10-CM

## 2023-01-05 DIAGNOSIS — M47.22 OSTEOARTHRITIS OF SPINE WITH RADICULOPATHY, CERVICAL REGION: ICD-10-CM

## 2023-01-05 PROCEDURE — 1160F RVW MEDS BY RX/DR IN RCRD: CPT | Mod: CPTII,95,, | Performed by: INTERNAL MEDICINE

## 2023-01-05 PROCEDURE — 1160F PR REVIEW ALL MEDS BY PRESCRIBER/CLIN PHARMACIST DOCUMENTED: ICD-10-PCS | Mod: CPTII,95,, | Performed by: INTERNAL MEDICINE

## 2023-01-05 PROCEDURE — 1159F MED LIST DOCD IN RCRD: CPT | Mod: CPTII,95,, | Performed by: INTERNAL MEDICINE

## 2023-01-05 PROCEDURE — 99213 OFFICE O/P EST LOW 20 MIN: CPT | Mod: 95,,, | Performed by: INTERNAL MEDICINE

## 2023-01-05 PROCEDURE — 1159F PR MEDICATION LIST DOCUMENTED IN MEDICAL RECORD: ICD-10-PCS | Mod: CPTII,95,, | Performed by: INTERNAL MEDICINE

## 2023-01-05 PROCEDURE — 99213 PR OFFICE/OUTPT VISIT, EST, LEVL III, 20-29 MIN: ICD-10-PCS | Mod: 95,,, | Performed by: INTERNAL MEDICINE

## 2023-01-05 NOTE — PROGRESS NOTES
Subjective:   Patient ID: Lashae Colvin is a 52 y.o. female  Chief complaint:   Chief Complaint   Patient presents with    EMG result       HPI  The patient location is: [place]  The chief complaint leading to consultation is: EMG results review, neck pain    Visit type: audiovisual    Face to Face time with patient: 16 min  18 minutes of total time spent on the encounter, which includes face to face time and non-face to face time preparing to see the patient (eg, review of tests), Obtaining and/or reviewing separately obtained history, Documenting clinical information in the electronic or other health record, Independently interpreting results (not separately reported) and communicating results to the patient/family/caregiver, or Care coordination (not separately reported).     Each patient to whom he or she provides medical services by telemedicine is:  (1) informed of the relationship between the physician and patient and the respective role of any other health care provider with respect to management of the patient; and (2) notified that he or she may decline to receive medical services by telemedicine and may withdraw from such care at any time.    Notes:   Here for emg review   Using traction and helpful     DJD of cervical spine, bulging cervical disc:     Reviewed EMG results   Had mri c spine 2018 and 2012  B hand numbness - first and 2nd digit   Using gpn 300-300 and helpful   - stopped mobic and ibuprofen   Sx managable at this time   No dropping objects or hand weakness  Previously:   Right shoulder pain - pain with putting on bra or abduction   Left shoulder xray unremarakble in past   Having B neck tightness and pain at times and radiates down B arms right > left with numbness down to fingers at times   - works long hours in front of computer     Review of Systems   Constitutional:  Negative for activity change and unexpected weight change.   HENT:  Negative for hearing loss, rhinorrhea and  trouble swallowing.    Eyes:  Negative for discharge and visual disturbance.   Respiratory:  Negative for chest tightness and wheezing.    Cardiovascular:  Negative for chest pain and palpitations.   Gastrointestinal:  Negative for blood in stool, constipation, diarrhea and vomiting.   Endocrine: Negative for polydipsia and polyuria.   Genitourinary:  Negative for difficulty urinating, dysuria, hematuria and menstrual problem.   Musculoskeletal:  Positive for neck pain. Negative for arthralgias and joint swelling.   Neurological:  Positive for headaches. Negative for weakness.   Psychiatric/Behavioral:  Negative for confusion and dysphoric mood.      Objective:  There were no vitals filed for this visit.  There is no height or weight on file to calculate BMI.    Physical Exam  Constitutional:       General: She is not in acute distress.     Appearance: She is well-developed. She is not diaphoretic.   Eyes:      General:         Right eye: No discharge.         Left eye: No discharge.      Conjunctiva/sclera: Conjunctivae normal.   Pulmonary:      Effort: Pulmonary effort is normal. No respiratory distress.   Skin:     Findings: No erythema or rash.   Neurological:      Mental Status: She is alert and oriented to person, place, and time.   Psychiatric:         Behavior: Behavior normal.         Thought Content: Thought content normal.         Judgment: Judgment normal.       Assessment:  1. Neck pain    2. Osteoarthritis of spine with radiculopathy, cervical region    3. IFG (impaired fasting glucose)        Plan:  Lashae was seen today for emg result.    Diagnoses and all orders for this visit:    Neck pain    Osteoarthritis of spine with radiculopathy, cervical region    IFG (impaired fasting glucose)    Discussed role of MRI and would like to hold off on   Will switch gpn 600mg nightly to see if better tolerated - less daytime sleepiness/fatigue  Standing desk   Gentle stretches and exercise    IFG:  A1c  increased  A1c - discussed lifestyls changes and she is planning to address this   Can consider metformin in future - discussed role of this if needed     Health Maintenance   Topic Date Due    Mammogram  12/01/2023    Lipid Panel  08/17/2027    TETANUS VACCINE  11/13/2029    Hepatitis C Screening  Completed

## 2023-01-16 PROBLEM — M47.22 OSTEOARTHRITIS OF SPINE WITH RADICULOPATHY, CERVICAL REGION: Status: ACTIVE | Noted: 2023-01-16

## 2023-01-16 PROBLEM — R73.01 IFG (IMPAIRED FASTING GLUCOSE): Status: ACTIVE | Noted: 2023-01-16

## 2023-01-16 PROBLEM — M54.2 NECK PAIN: Status: ACTIVE | Noted: 2023-01-16

## 2023-02-10 DIAGNOSIS — M10.9 GOUT, UNSPECIFIED CAUSE, UNSPECIFIED CHRONICITY, UNSPECIFIED SITE: ICD-10-CM

## 2023-02-10 RX ORDER — ALLOPURINOL 300 MG/1
300 TABLET ORAL DAILY
Qty: 90 TABLET | Refills: 3 | Status: SHIPPED | OUTPATIENT
Start: 2023-02-10 | End: 2024-02-12

## 2023-02-10 NOTE — TELEPHONE ENCOUNTER
Patient requesting refill on allopurinoL (ZYLOPRIM) 300 MG tablet  .    According to [facility] patient last office visit with an Internal Medicine provider was on 01/05/23 and the last refill date for the requested medication was on 11/28/22.

## 2023-02-10 NOTE — TELEPHONE ENCOUNTER
No new care gaps identified.  [facility] Embedded Care Gaps. Reference number: [id number]. 2/10/2023   11:43:06 AM CST

## 2023-03-08 ENCOUNTER — OFFICE VISIT (OUTPATIENT)
Dept: INTERNAL MEDICINE | Facility: CLINIC | Age: 53
End: 2023-03-08
Attending: INTERNAL MEDICINE
Payer: COMMERCIAL

## 2023-03-08 VITALS
HEART RATE: 77 BPM | WEIGHT: 175.25 LBS | SYSTOLIC BLOOD PRESSURE: 128 MMHG | BODY MASS INDEX: 28.16 KG/M2 | DIASTOLIC BLOOD PRESSURE: 80 MMHG | OXYGEN SATURATION: 99 % | HEIGHT: 66 IN

## 2023-03-08 DIAGNOSIS — M79.89 LEFT ARM SWELLING: Primary | ICD-10-CM

## 2023-03-08 DIAGNOSIS — M47.22 OSTEOARTHRITIS OF SPINE WITH RADICULOPATHY, CERVICAL REGION: ICD-10-CM

## 2023-03-08 DIAGNOSIS — M62.838 MUSCLE SPASM: ICD-10-CM

## 2023-03-08 PROCEDURE — 99214 OFFICE O/P EST MOD 30 MIN: CPT | Mod: S$GLB,,, | Performed by: INTERNAL MEDICINE

## 2023-03-08 PROCEDURE — 1159F PR MEDICATION LIST DOCUMENTED IN MEDICAL RECORD: ICD-10-PCS | Mod: CPTII,S$GLB,, | Performed by: INTERNAL MEDICINE

## 2023-03-08 PROCEDURE — 99999 PR PBB SHADOW E&M-EST. PATIENT-LVL IV: CPT | Mod: PBBFAC,,, | Performed by: INTERNAL MEDICINE

## 2023-03-08 PROCEDURE — 4010F PR ACE/ARB THEARPY RXD/TAKEN: ICD-10-PCS | Mod: CPTII,S$GLB,, | Performed by: INTERNAL MEDICINE

## 2023-03-08 PROCEDURE — 3079F DIAST BP 80-89 MM HG: CPT | Mod: CPTII,S$GLB,, | Performed by: INTERNAL MEDICINE

## 2023-03-08 PROCEDURE — 99999 PR PBB SHADOW E&M-EST. PATIENT-LVL IV: ICD-10-PCS | Mod: PBBFAC,,, | Performed by: INTERNAL MEDICINE

## 2023-03-08 PROCEDURE — 3074F SYST BP LT 130 MM HG: CPT | Mod: CPTII,S$GLB,, | Performed by: INTERNAL MEDICINE

## 2023-03-08 PROCEDURE — 3008F BODY MASS INDEX DOCD: CPT | Mod: CPTII,S$GLB,, | Performed by: INTERNAL MEDICINE

## 2023-03-08 PROCEDURE — 1159F MED LIST DOCD IN RCRD: CPT | Mod: CPTII,S$GLB,, | Performed by: INTERNAL MEDICINE

## 2023-03-08 PROCEDURE — 3008F PR BODY MASS INDEX (BMI) DOCUMENTED: ICD-10-PCS | Mod: CPTII,S$GLB,, | Performed by: INTERNAL MEDICINE

## 2023-03-08 PROCEDURE — 3074F PR MOST RECENT SYSTOLIC BLOOD PRESSURE < 130 MM HG: ICD-10-PCS | Mod: CPTII,S$GLB,, | Performed by: INTERNAL MEDICINE

## 2023-03-08 PROCEDURE — 1160F PR REVIEW ALL MEDS BY PRESCRIBER/CLIN PHARMACIST DOCUMENTED: ICD-10-PCS | Mod: CPTII,S$GLB,, | Performed by: INTERNAL MEDICINE

## 2023-03-08 PROCEDURE — 3079F PR MOST RECENT DIASTOLIC BLOOD PRESSURE 80-89 MM HG: ICD-10-PCS | Mod: CPTII,S$GLB,, | Performed by: INTERNAL MEDICINE

## 2023-03-08 PROCEDURE — 1160F RVW MEDS BY RX/DR IN RCRD: CPT | Mod: CPTII,S$GLB,, | Performed by: INTERNAL MEDICINE

## 2023-03-08 PROCEDURE — 99214 PR OFFICE/OUTPT VISIT, EST, LEVL IV, 30-39 MIN: ICD-10-PCS | Mod: S$GLB,,, | Performed by: INTERNAL MEDICINE

## 2023-03-08 PROCEDURE — 4010F ACE/ARB THERAPY RXD/TAKEN: CPT | Mod: CPTII,S$GLB,, | Performed by: INTERNAL MEDICINE

## 2023-03-08 RX ORDER — METHOCARBAMOL 750 MG/1
750 TABLET, FILM COATED ORAL 3 TIMES DAILY PRN
Qty: 60 TABLET | Refills: 2 | Status: SHIPPED | OUTPATIENT
Start: 2023-03-08 | End: 2023-03-18

## 2023-03-08 NOTE — PROGRESS NOTES
Subjective:   Patient ID: Lashae Colvin is a 53 y.o. female  Chief complaint:   Chief Complaint   Patient presents with    Arm Swelling     Left        HPI  Pt here for urgent care appointment for left arm swelling    Cervical radiculopathy:  Stopped lifting weights in Jan and stretching    She noticed Left forearm with more swelling and mild ttp   - sx intermittent over past 3 years - more consistent swelling since the fall of last year  - also noticed left upper arm with more swelling at lateral aspect of left prox arm as well   No rashes or inc warmth     No trouble with rings or hand swelling     No improveemnt with aleve or haet and cold packs  Stretching and avoiding lifting weights     Chronic neck pain - stable overall   No standing desk but getting up frequently   Using exercise bands and traction     Utd on mmg   No breast changes     From lef tshoulder   No hx of cellulitis   Had many vaccinations in left arm   No falls or trauma   + mm spasms from c radiculopaty - cont concervative mgmtg     Mm relaxers helpful in past     Review of Systems   Constitutional:  Negative for activity change and unexpected weight change.   HENT:  Negative for hearing loss, rhinorrhea and trouble swallowing.    Eyes:  Negative for discharge and visual disturbance.   Respiratory:  Negative for chest tightness and wheezing.    Cardiovascular:  Negative for chest pain and palpitations.   Gastrointestinal:  Negative for blood in stool, constipation, diarrhea and vomiting.   Endocrine: Negative for polydipsia and polyuria.   Genitourinary:  Negative for difficulty urinating, dysuria, hematuria and menstrual problem.   Musculoskeletal:  Positive for neck pain. Negative for arthralgias and joint swelling.   Neurological:  Negative for weakness and headaches.   Psychiatric/Behavioral:  Negative for confusion and dysphoric mood.      Objective:  Vitals:    03/08/23 1101   BP: 128/80   BP Location: Left arm   Patient Position:  "Sitting   Pulse: 77   SpO2: 99%   Weight: 79.5 kg (175 lb 4.3 oz)   Height: 5' 6" (1.676 m)     Body mass index is 28.29 kg/m².    Physical Exam  Vitals reviewed.   Constitutional:       Appearance: Normal appearance. She is well-developed.   HENT:      Head: Normocephalic and atraumatic.      Right Ear: Tympanic membrane, ear canal and external ear normal.      Left Ear: Tympanic membrane, ear canal and external ear normal.      Nose: Nose normal. No congestion.      Mouth/Throat:      Mouth: Mucous membranes are moist.      Pharynx: Oropharynx is clear. No oropharyngeal exudate.   Eyes:      Extraocular Movements: Extraocular movements intact.      Conjunctiva/sclera: Conjunctivae normal.   Neck:      Thyroid: No thyromegaly.   Cardiovascular:      Rate and Rhythm: Normal rate and regular rhythm.      Pulses: Normal pulses.      Heart sounds: Normal heart sounds.   Pulmonary:      Effort: Pulmonary effort is normal.      Breath sounds: Normal breath sounds.   Chest:   Breasts:     Breasts are symmetrical.      Right: Normal. No swelling, bleeding, inverted nipple, mass, nipple discharge, skin change or tenderness.      Left: Normal. No swelling, bleeding, inverted nipple, mass, nipple discharge, skin change or tenderness.   Abdominal:      General: Bowel sounds are normal.      Palpations: Abdomen is soft.   Musculoskeletal:         General: Swelling present. No tenderness.      Cervical back: Normal range of motion and neck supple.      Comments: Left prox arm circumference - 12.75" 6" above olecranon'  and right arm 12.5" circ 6" prox to olecranon    + ttp and swelling at left forearm lateral aspect    Lymphadenopathy:      Cervical: No cervical adenopathy.      Upper Body:      Right upper body: No supraclavicular, axillary or epitrochlear adenopathy.      Left upper body: No supraclavicular, axillary or epitrochlear adenopathy.   Skin:     General: Skin is warm and dry.      Capillary Refill: Capillary refill " takes less than 2 seconds.      Nails: There is no clubbing.   Neurological:      General: No focal deficit present.      Mental Status: She is alert and oriented to person, place, and time. Mental status is at baseline.      Gait: Gait normal.   Psychiatric:         Speech: Speech normal.         Behavior: Behavior normal.         Thought Content: Thought content normal.       Assessment:  1. Left arm swelling    2. Muscle spasm    3. Osteoarthritis of spine with radiculopathy, cervical region        Plan:  1. Left arm swelling  -     US Upper Extremity Veins Left; Future; Expected date: 03/08/2023  -     US Soft Tissue Upper Extremity, Left; Future; Expected date: 03/08/2023  Et unclear ? Lymphedema or venous insuff  Breast exam unremarkable today - mmg utd  Check US to eval for clot  Consider vascular referral and additional imaging of chest vasculature pending US results     2. Muscle spasm  -     methocarbamoL (ROBAXIN) 750 MG Tab; Take 1 tablet (750 mg total) by mouth 3 (three) times daily as needed (muscle spasm).  Dispense: 60 tablet; Refill: 2  Rx helpful prn   Cont good posture and PT exercises/stretches, med refill given for prn use     3. Osteoarthritis of spine with radiculopathy, cervical region  As above   If reconsiders pain mgmt or nsx eval will let me know   cont coservative mgmt of cervical spine     Health Maintenance   Topic Date Due    Mammogram  12/01/2023    Lipid Panel  08/17/2027    TETANUS VACCINE  11/13/2029    Hepatitis C Screening  Completed

## 2023-03-14 ENCOUNTER — HOSPITAL ENCOUNTER (OUTPATIENT)
Dept: RADIOLOGY | Facility: OTHER | Age: 53
Discharge: HOME | End: 2023-03-14
Attending: INTERNAL MEDICINE
Payer: COMMERCIAL

## 2023-03-14 DIAGNOSIS — M79.89 LEFT ARM SWELLING: ICD-10-CM

## 2023-03-14 PROCEDURE — 76882 US LMTD JT/FCL EVL NVASC XTR: CPT | Mod: TC,LT

## 2023-03-14 PROCEDURE — 93971 EXTREMITY STUDY: CPT | Mod: 26,LT,, | Performed by: RADIOLOGY

## 2023-03-14 PROCEDURE — 93971 US UPPER EXTREMITY VEINS LEFT: ICD-10-PCS | Mod: 26,LT,, | Performed by: RADIOLOGY

## 2023-03-14 PROCEDURE — 76882 US SOFT TISSUE, UPPER EXTREMITY, LEFT: ICD-10-PCS | Mod: 26,59,LT, | Performed by: RADIOLOGY

## 2023-03-14 PROCEDURE — 76882 US LMTD JT/FCL EVL NVASC XTR: CPT | Mod: 26,59,LT, | Performed by: RADIOLOGY

## 2023-03-14 PROCEDURE — 93971 EXTREMITY STUDY: CPT | Mod: TC,LT

## 2023-03-17 DIAGNOSIS — M79.89 LEFT ARM SWELLING: Primary | ICD-10-CM

## 2023-03-18 NOTE — PROGRESS NOTES
Message sent to pt via my chart with results and updates to plan.     Please help schedule appt with Dr. Oh at [facility]

## 2023-03-20 ENCOUNTER — TELEPHONE (OUTPATIENT)
Dept: INTERNAL MEDICINE | Facility: CLINIC | Age: 53
End: 2023-03-20
Payer: COMMERCIAL

## 2023-03-20 NOTE — TELEPHONE ENCOUNTER
----- Message from Kristen Abreu MD sent at 3/17/2023  9:59 PM CDT -----  Message sent to pt via my chart with results and updates to plan.     Please help schedule appt with Dr. Oh at [facility]

## 2023-03-20 NOTE — TELEPHONE ENCOUNTER
----- Message from Kristen Abreu MD sent at 3/17/2023  9:59 PM CDT -----  Message sent to pt via my chart with results and updates to plan.     Please help schedule appt with Dr. Oh at [facility]

## 2023-05-05 ENCOUNTER — CLINICAL SUPPORT (OUTPATIENT)
Dept: OTHER | Facility: CLINIC | Age: 53
End: 2023-05-05
Payer: COMMERCIAL

## 2023-05-05 DIAGNOSIS — Z00.8 ENCOUNTER FOR OTHER GENERAL EXAMINATION: ICD-10-CM

## 2023-05-06 VITALS
SYSTOLIC BLOOD PRESSURE: 140 MMHG | BODY MASS INDEX: 27.8 KG/M2 | DIASTOLIC BLOOD PRESSURE: 74 MMHG | HEIGHT: 66 IN | WEIGHT: 173 LBS

## 2023-05-06 LAB
GLUCOSE SERPL-MCNC: 116 MG/DL (ref 60–140)
HDLC SERPL-MCNC: 69 MG/DL
POC CHOLESTEROL, LDL (DOCK): 66 MG/DL
POC CHOLESTEROL, TOTAL: 168 MG/DL
TRIGL SERPL-MCNC: 205 MG/DL

## 2023-05-23 DIAGNOSIS — F51.5 NIGHTMARE DISORDER: ICD-10-CM

## 2023-05-23 RX ORDER — PRAZOSIN HYDROCHLORIDE 2 MG/1
4 CAPSULE ORAL NIGHTLY
Qty: 180 CAPSULE | Refills: 1 | Status: SHIPPED | OUTPATIENT
Start: 2023-05-23 | End: 2023-05-26 | Stop reason: SDUPTHER

## 2023-05-23 RX ORDER — PRAZOSIN HYDROCHLORIDE 2 MG/1
4 CAPSULE ORAL NIGHTLY
Qty: 180 CAPSULE | Refills: 1 | Status: SHIPPED | OUTPATIENT
Start: 2023-05-23

## 2023-05-26 DIAGNOSIS — F51.5 NIGHTMARE DISORDER: ICD-10-CM

## 2023-05-26 RX ORDER — PRAZOSIN HYDROCHLORIDE 2 MG/1
4 CAPSULE ORAL NIGHTLY
Qty: 180 CAPSULE | Refills: 1 | Status: SHIPPED | OUTPATIENT
Start: 2023-05-26

## 2023-06-01 ENCOUNTER — TELEPHONE (OUTPATIENT)
Dept: OPTOMETRY | Facility: CLINIC | Age: 53
End: 2023-06-01
Payer: COMMERCIAL

## 2023-06-01 ENCOUNTER — PATIENT MESSAGE (OUTPATIENT)
Dept: OPTOMETRY | Facility: CLINIC | Age: 53
End: 2023-06-01
Payer: COMMERCIAL

## 2023-06-01 NOTE — TELEPHONE ENCOUNTER
Schedule appt    ----- Message from Yvonne Arias sent at 6/1/2023 10:31 AM CDT -----  Contact: pt @ [phone]  Lashae Colvin calling regarding Appointment Access  (message) for #pt is calling to get annual eye appt and contact lens fitting, asking for call back

## 2023-06-22 DIAGNOSIS — E78.5 HYPERLIPIDEMIA, UNSPECIFIED HYPERLIPIDEMIA TYPE: ICD-10-CM

## 2023-06-22 RX ORDER — ROSUVASTATIN CALCIUM 20 MG/1
TABLET, COATED ORAL
Qty: 90 TABLET | Refills: 0 | Status: SHIPPED | OUTPATIENT
Start: 2023-06-22 | End: 2023-08-21 | Stop reason: SDUPTHER

## 2023-06-22 NOTE — TELEPHONE ENCOUNTER
Provider Staff:  Action required for this patient     Please see care gap opportunities below in Care Due Message:   CMP, CMP, uric acid, lipid panel due 8/12/23    Thanks!  [facility]     Appointments      Date Provider   Last Visit   3/8/2023 Kristen Abreu MD   Next Visit   Visit date not found Kristen Abreu MD     Refill Decision Note   Lashae Colvin  is requesting a refill authorization.  Brief Assessment and Rationale for Refill:  Approve     Medication Therapy Plan:         Comments:     Note composed:2:53 PM 06/22/2023

## 2023-06-22 NOTE — TELEPHONE ENCOUNTER
Care Due:                  Date            Visit Type   Department     Provider  --------------------------------------------------------------------------------                                MYCHART                              FOLLOWUP/OF  [facility] INTERNAL  Last Visit: 03-      FICE VISIT   MEDICINE       Kristen Abreu  Next Visit: None Scheduled  None         None Found                                                            Last  Test          Frequency    Reason                     Performed    Due Date  --------------------------------------------------------------------------------    CBC.........  12 months..  allopurinoL..............  08- 08-    CMP.........  12 months..  allopurinoL, rosuvastatin  08- 08-    Uric Acid...  12 months..  allopurinoL..............  08- 08-    [employer] Embedded Care Due Messages. Reference number: [id number].   6/22/2023 10:21:14 AM CDT

## 2023-07-12 ENCOUNTER — OFFICE VISIT (OUTPATIENT)
Dept: OPTOMETRY | Facility: CLINIC | Age: 53
End: 2023-07-12
Payer: COMMERCIAL

## 2023-07-12 DIAGNOSIS — Z46.0 ENCOUNTER FOR FITTING OR ADJUSTMENT OF SPECTACLES OR CONTACT LENSES: Primary | ICD-10-CM

## 2023-07-12 PROCEDURE — 92310 PR CONTACT LENS FITTING (NO CHANGE): ICD-10-PCS | Mod: CSM,S$GLB,, | Performed by: OPTOMETRIST

## 2023-07-12 PROCEDURE — 92310 CONTACT LENS FITTING OU: CPT | Mod: CSM,S$GLB,, | Performed by: OPTOMETRIST

## 2023-07-12 PROCEDURE — 99499 NO LOS: ICD-10-PCS | Mod: ,,, | Performed by: OPTOMETRIST

## 2023-07-12 PROCEDURE — 99499 UNLISTED E&M SERVICE: CPT | Mod: ,,, | Performed by: OPTOMETRIST

## 2023-07-13 NOTE — PATIENT INSTRUCTIONS
Soft contact lens wearer with report of vague problems with [facility] with the contact lens in each eye.  Lens fit appears satisfactory in each eye, although slit lamp examination with the contact lenses in reveals greater rotation of the left lens than the right lens.  No evidence of adverse effects of contact lens wear in either eye.  Over-refraction done over CLs today reveals need for slight power adjustment in each contact lens.    Report of bilateral dry eye symptoms.  Xiidra ophthalmic solution previously prescribed, but Rx never filled because insurance carrier would not cover the cost of that medication.  However, she has now enrolled in a medical assistance plan, and she would like an updated Rx for Xiidra ophthalmic solution.    Return in several days without contact lenses in on that date for general eye exam and dry eye evaluation.  Refraction, tonometry, and dilated fundus exam to be done on that date.    Order new trial SCLs with power change, as determined by    -elizabet-refraction done toda, for  on that date  -    OD Trial Biofinity Toric    8.7  14,5  -7.50 -1.25 x 040  OS Trial Biofinity Toric   8.7   14.5  -9.50 -1.25 x 140

## 2023-07-13 NOTE — PROGRESS NOTES
[facility]    Contact Lens follow-up.   Vague complaint of VA problem with CL in each eye.  Needs to focus   somewhat  in the right eye with CL in, and feels that she has to do the   same and view straight ahead with head turned to the right in the left   eye.     Pt states that she was not able to fill Xiidra Rx due to insurance. Pt is   now a part of a patient medical assistance program and would like to   discuss starting medication.    Patient's age: 53 y.o wF  Occupation: [employer] [place]  Approximate date of last eye examination:  04/06/2022  Name of last eye doctor seen: Dr. Paul  City/State: [facility]  Wears glasses? Yes     If yes, wears  Full-time or part-time?  Part-time  Present glasses are: Bifocal, SV Distance, SV Reading? SV distance  Approximate age of present glasses:  A few years   Got new glasses following last exam, or subsequently?:  No   Any problem with VA with glasses?  Yes, trouble with changes in reading   VA. Pt also feels that eyes do not work well together (see notes above)   Wears CLs?:  Yes           If yes:              Type of CL worn:  Biofinity toric - monthly lenses                                                            Wears full-time or part-time:  Full-time               Sleeps with contact lenses:  No               CL Solution used:  Chhaya CL solution               How often replace CLs:  Once monthly              Any problem with VA with CLs?  Pt feels that eyes do not work   well together and VA may have changed. Also trouble with dry eyes.  Would   like to try Xiidra ophthalmic solution.  Did not get previous Rx for   Xiidra filled because insurance carrier would not cover the Rx.                 Headaches?  No  Eye pain/discomfort?  Dryness.  Otherwise, none                                                                                   Flashes?  No  Floaters?  Longstanding floaters in VA - no changes since TRESSA  Diplopia/Double vision?  No  Patient's  "Ocular History:         Any eye surgeries? No         Any eye injury?  No         Any treatment for eye disease?  Yes,  s/p laser treatment to OS for   small retinal hole  hole.  H/O posterior vitreous detachment in both eyes.   Family history of eye disease?  (+)ARMD wet and dry - mother (+)cataracts   - both parents  Significant patient medical history:         1. Diabetes?  No       If yes, IDDM or NIDDM? N/A   2. HBP?   - well-controlled with mes.               3. Other (describe):  Cholesterol    ! OTC eyedrops currently using:  Blink and Lumify qAM OU   ! Prescription eye meds currently using:  None   ! Any history of allergy/adverse reaction to any eye meds used   previously?  No    ! Any history of allergy/adverse reaction to eyedrops used during prior   eye exam(s)? No    ! Any history of allergy/adverse reaction to Novacaine or similar meds?   No   ! Any history of allergy/adverse reaction to Epinephrine or similar meds?   No    ! Patient okay with use of anesthetic eyedrops to check eye pressure?    Yes         ! Patient okay with use of eyedrops to dilate pupils today?  Yes   !  Allergies/Medications/Medical History/Family History reviewed today?    Yes      PD =   68/64  Desired reading distance =  16.25"                                                                 Last edited by Slim Paul, OD on 7/12/2023  8:45 PM.            Assessment /Plan     For exam results, see Encounter Report.    1. Encounter for fitting or adjustment of spectacles or contact lenses                     Soft contact lens wearer with report of vague problems with VA with the contact lens in each eye.  Lens fit appears satisfactory in each eye, although slit lamp examination with the contact lenses in reveals greater rotation of the left lens than the right lens.  No evidence of adverse effects of contact lens wear in either eye.  Over-refraction done over CLs today reveals need for slight power adjustment in each " contact lens.    Report of bilateral dry eye symptoms.  Xiidra ophthalmic solution previously prescribed, but Rx never filled because insurance carrier would not cover the cost of that medication.  However, she has now enrolled in a medical assistance plan, and she would like an updated Rx for Xiidra ophthalmic solution.    Return in several days without contact lenses in on that date for general eye exam and dry eye evaluation.  Refraction, tonometry, and dilated fundus exam to be done on that date.    Order new trial SCLs with power change, as determined by over-refraction done today, for  on that date.

## 2023-07-27 ENCOUNTER — TELEPHONE (OUTPATIENT)
Dept: OPTOMETRY | Facility: CLINIC | Age: 53
End: 2023-07-27
Payer: COMMERCIAL

## 2023-07-27 ENCOUNTER — PATIENT MESSAGE (OUTPATIENT)
Dept: INTERNAL MEDICINE | Facility: CLINIC | Age: 53
End: 2023-07-27
Payer: COMMERCIAL

## 2023-07-27 DIAGNOSIS — R30.0 DYSURIA: Primary | ICD-10-CM

## 2023-07-28 ENCOUNTER — LAB VISIT (OUTPATIENT)
Dept: LAB | Facility: OTHER | Age: 53
End: 2023-07-28
Attending: PHYSICIAN ASSISTANT
Payer: COMMERCIAL

## 2023-07-28 ENCOUNTER — TELEPHONE (OUTPATIENT)
Dept: INTERNAL MEDICINE | Facility: CLINIC | Age: 53
End: 2023-07-28
Payer: COMMERCIAL

## 2023-07-28 DIAGNOSIS — R30.0 DYSURIA: ICD-10-CM

## 2023-07-28 LAB
BACTERIA #/AREA URNS HPF: ABNORMAL /HPF
BILIRUB UR QL STRIP: NEGATIVE
CLARITY UR: ABNORMAL
COLOR UR: YELLOW
GLUCOSE UR QL STRIP: NEGATIVE
HGB UR QL STRIP: NEGATIVE
HYALINE CASTS #/AREA URNS LPF: 1 /LPF
KETONES UR QL STRIP: NEGATIVE
LEUKOCYTE ESTERASE UR QL STRIP: ABNORMAL
MICROSCOPIC COMMENT: ABNORMAL
NITRITE UR QL STRIP: POSITIVE
PH UR STRIP: 6 [PH] (ref 5–8)
PROT UR QL STRIP: ABNORMAL
RBC #/AREA URNS HPF: 7 /HPF (ref 0–4)
SP GR UR STRIP: 1.02 (ref 1–1.03)
SQUAMOUS #/AREA URNS HPF: 1 /HPF
URN SPEC COLLECT METH UR: ABNORMAL
WBC #/AREA URNS HPF: >100 /HPF (ref 0–5)

## 2023-07-28 PROCEDURE — 81000 URINALYSIS NONAUTO W/SCOPE: CPT | Performed by: PHYSICIAN ASSISTANT

## 2023-07-28 PROCEDURE — 87086 URINE CULTURE/COLONY COUNT: CPT | Performed by: PHYSICIAN ASSISTANT

## 2023-07-28 PROCEDURE — 87077 CULTURE AEROBIC IDENTIFY: CPT | Performed by: PHYSICIAN ASSISTANT

## 2023-07-28 PROCEDURE — 87088 URINE BACTERIA CULTURE: CPT | Performed by: PHYSICIAN ASSISTANT

## 2023-07-28 PROCEDURE — 87186 SC STD MICRODIL/AGAR DIL: CPT | Performed by: PHYSICIAN ASSISTANT

## 2023-07-28 RX ORDER — NITROFURANTOIN 25; 75 MG/1; MG/1
100 CAPSULE ORAL 2 TIMES DAILY
Qty: 10 CAPSULE | Refills: 0 | Status: SHIPPED | OUTPATIENT
Start: 2023-07-28 | End: 2023-08-02

## 2023-07-28 NOTE — TELEPHONE ENCOUNTER
'Please let patient know that prescription for empiric abx treatment has been sent to her local pharmacy. Please ask her to submit a urine sample before she starts the antibiotics. Please make sure she is aware of ED prompts. -ACM'

## 2023-07-28 NOTE — TELEPHONE ENCOUNTER
Please let patient know that prescription for empiric abx treatment has been sent to her local pharmacy. Please ask her to submit a urine sample before she starts the antibiotics. Please make sure she is aware of ED prompts. -OBIE

## 2023-07-28 NOTE — TELEPHONE ENCOUNTER
----- Message from Jazzy Haney sent at 7/28/2023  3:05 PM CDT -----  Regarding: returning call  Name of caller: Lashae       What is the requesting detail: Pt is returning a call back from Romina. Please advise       Can the clinic reply by [facility]:       What number to call back:[phone]

## 2023-07-30 LAB — BACTERIA UR CULT: ABNORMAL

## 2023-08-01 ENCOUNTER — TELEPHONE (OUTPATIENT)
Dept: OPTOMETRY | Facility: CLINIC | Age: 53
End: 2023-08-01
Payer: COMMERCIAL

## 2023-08-21 DIAGNOSIS — E78.5 HYPERLIPIDEMIA, UNSPECIFIED HYPERLIPIDEMIA TYPE: ICD-10-CM

## 2023-08-21 DIAGNOSIS — F51.5 NIGHTMARE DISORDER: ICD-10-CM

## 2023-08-21 RX ORDER — GABAPENTIN 300 MG/1
300 CAPSULE ORAL 2 TIMES DAILY
Qty: 180 CAPSULE | Refills: 3 | Status: SHIPPED | OUTPATIENT
Start: 2023-08-21 | End: 2023-12-05

## 2023-08-21 NOTE — TELEPHONE ENCOUNTER
Refill Routing Note   Medication(s) are not appropriate for processing by [facility] for the following reason(s):      Medication outside of protocol    [facility] action(s):  Route Care Due:  None identified            Appointments  past 12m or future 3m with PCP    Date Provider   Last Visit   3/8/2023 Kristen Abreu MD   Next Visit   Visit date not found Kristen Abreu MD   ED visits in past 90 days: 0        Note composed:1:06 PM 08/21/2023

## 2023-08-21 NOTE — TELEPHONE ENCOUNTER
No care due was identified.  [facility] Embedded Care Due Messages. Reference number: [id number].   8/21/2023 12:22:24 PM CDT

## 2023-08-21 NOTE — TELEPHONE ENCOUNTER
Care Due:                  Date            Visit Type   Department     Provider  --------------------------------------------------------------------------------                                MYCHART                              FOLLOWUP/OF  [facility] INTERNAL  Last Visit: 03-      [facility] VISIT   MEDICINE       Kristen Abreu  Next Visit: None Scheduled  None         None Found                                                            Last  Test          Frequency    Reason                     Performed    Due Date  --------------------------------------------------------------------------------    CBC.........  12 months..  allopurinoL..............  08- 08-    CMP.........  12 months..  allopurinoL, rosuvastatin  08- 08-    Uric Acid...  12 months..  allopurinoL..............  08- 08-    [employer] Embedded Care Due Messages. Reference number: [id number].   8/21/2023 12:21:48 PM CDT

## 2023-08-22 RX ORDER — ROSUVASTATIN CALCIUM 20 MG/1
20 TABLET, COATED ORAL DAILY
Qty: 90 TABLET | Refills: 3 | Status: SHIPPED | OUTPATIENT
Start: 2023-08-22

## 2023-08-22 NOTE — TELEPHONE ENCOUNTER
Refill Routing Note   Medication(s) are not appropriate for processing by [facility] for the following reason(s):      Required labs outdated    [facility] action(s):  Defer Care Due:  Labs due            Appointments  past 12m or future 3m with PCP    Date Provider   Last Visit   3/8/2023 Kristen Abreu MD   Next Visit   8/21/2023 Kristen Abreu MD   ED visits in past 90 days: 0        Note composed:9:46 AM 08/22/2023

## 2023-10-10 ENCOUNTER — PATIENT MESSAGE (OUTPATIENT)
Dept: INTERNAL MEDICINE | Facility: CLINIC | Age: 53
End: 2023-10-10
Payer: COMMERCIAL

## 2023-10-10 DIAGNOSIS — I10 ESSENTIAL HYPERTENSION: ICD-10-CM

## 2023-10-11 RX ORDER — LISINOPRIL 10 MG/1
10 TABLET ORAL DAILY
Qty: 90 TABLET | Refills: 3 | Status: SHIPPED | OUTPATIENT
Start: 2023-10-11

## 2023-10-11 NOTE — TELEPHONE ENCOUNTER
Refill Routing Note   Medication(s) are not appropriate for processing by [facility] for the following reason(s):      Required labs outdated  Required vitals abnormal  No active prescription written by provider    [facility] action(s):  Defer Care Due:  None identified            Appointments  past 12m or future 3m with PCP    Date Provider   Last Visit   3/8/2023 Kristen Abreu MD   Next Visit   Visit date not found Kristen Abreu MD   ED visits in past 90 days: 0        Note composed:7:18 AM 10/11/2023

## 2023-10-11 NOTE — TELEPHONE ENCOUNTER
No care due was identified.  [facility] Embedded Care Due Messages. Reference number: [id number].   10/11/2023 6:34:47 AM CDT

## 2023-11-15 ENCOUNTER — OFFICE VISIT (OUTPATIENT)
Dept: OPTOMETRY | Facility: CLINIC | Age: 53
End: 2023-11-15
Payer: COMMERCIAL

## 2023-11-15 DIAGNOSIS — Z46.0 ENCOUNTER FOR FITTING OR ADJUSTMENT OF SPECTACLES OR CONTACT LENSES: Primary | ICD-10-CM

## 2023-11-15 PROCEDURE — 99499 UNLISTED E&M SERVICE: CPT | Mod: ,,, | Performed by: OPTOMETRIST

## 2023-11-15 PROCEDURE — 92310 PR CONTACT LENS FITTING (NO CHANGE): ICD-10-PCS | Mod: CSM,S$GLB,, | Performed by: OPTOMETRIST

## 2023-11-15 PROCEDURE — 99999 PR PBB SHADOW E&M-EST. PATIENT-LVL III: ICD-10-PCS | Mod: PBBFAC,,, | Performed by: OPTOMETRIST

## 2023-11-15 PROCEDURE — 99499 NO LOS: ICD-10-PCS | Mod: ,,, | Performed by: OPTOMETRIST

## 2023-11-15 PROCEDURE — 99999 PR PBB SHADOW E&M-EST. PATIENT-LVL III: CPT | Mod: PBBFAC,,, | Performed by: OPTOMETRIST

## 2023-11-15 PROCEDURE — 92310 CONTACT LENS FITTING OU: CPT | Mod: CSM,S$GLB,, | Performed by: OPTOMETRIST

## 2023-11-16 NOTE — PATIENT INSTRUCTIONS
Ms. Colvin in today for contact lens follow-up visit.  Wearing trial Biofinity Toric SCL in each eye.  Good lens fit.  Wearing CLs well in both eyes, without evidence of adverse effects of contact  lens wear in either eye.       Over-refraction indicates need for power adjustment in each lens    Will have the contact lens department order and dispense new trial Biofinity Toric SCLs   OD  -8.00 -1.75 x 040 (power suggested by [email or web address])   OS  -10.00 -1.75 x 140 (pwer suggested by [email or web address])  Wear as usual for a few days, and then call/email with report on satisfaction with VA with the new trial lenses.   If happy with VA, will finalize the contact lens Rx and send the RX to Ms. Colvin.

## 2023-11-16 NOTE — PROGRESS NOTES
"HPI     Contact Lens Follow Up            Comments: Ms. Colvin in today for contact lens follow-up visit, only.  She will return at later date for refraction for spectacle lens Rx and for   DFE.    Lens comfort okay, but still notices some "dryness" symptoms with the CLs   in.  Feels she sees better at distance in the left eye than in the right eye   with the contact lenses.  Wearing reading glasses over CLs as needed, "and   that works pretty well"          Comments    PD 68/64  Reading distance = 16.25"          Last edited by Slim Paul, OD on 11/15/2023  1:02 PM.            Assessment /Plan     For exam results, see Encounter Report.    1. Encounter for fitting or adjustment of spectacles or contact lenses                       Ms. Colvin in today for contact lens follow-up visit.  Wearing trial Biofinity Toric SCL in each eye.  Good lens fit.  Wearing CLs well in both eyes, without evidence of adverse effects of contact  lens wear in either eye.       Over-refraction indicates need for power adjustment in each lens    Will have the contact lens department order and dispense new trial Biofinity Toric SCLs   OD  -8.00 -1.75 x 040 (power suggested by [email or web address])   OS  -10.00 -1.75 x 140 (pwer suggested by [email or web address])  Wear as usual for a few days, and then call/email with report on satisfaction with VA with the new trial lenses.   If happy with VA, will finalize the contact lens Rx and send the RX to Ms. Colvin.       "

## 2023-11-17 ENCOUNTER — PATIENT MESSAGE (OUTPATIENT)
Dept: OPTOMETRY | Facility: CLINIC | Age: 53
End: 2023-11-17
Payer: COMMERCIAL

## 2023-12-05 ENCOUNTER — PATIENT MESSAGE (OUTPATIENT)
Dept: INTERNAL MEDICINE | Facility: CLINIC | Age: 53
End: 2023-12-05

## 2023-12-05 ENCOUNTER — OFFICE VISIT (OUTPATIENT)
Dept: INTERNAL MEDICINE | Facility: CLINIC | Age: 53
End: 2023-12-05
Attending: INTERNAL MEDICINE
Payer: COMMERCIAL

## 2023-12-05 VITALS
HEART RATE: 100 BPM | OXYGEN SATURATION: 98 % | WEIGHT: 174.19 LBS | BODY MASS INDEX: 27.99 KG/M2 | HEIGHT: 66 IN | SYSTOLIC BLOOD PRESSURE: 120 MMHG | DIASTOLIC BLOOD PRESSURE: 74 MMHG

## 2023-12-05 DIAGNOSIS — Z12.11 SCREENING FOR COLON CANCER: ICD-10-CM

## 2023-12-05 DIAGNOSIS — N95.9 MENOPAUSAL PROBLEM: ICD-10-CM

## 2023-12-05 DIAGNOSIS — E78.5 HYPERLIPIDEMIA, UNSPECIFIED HYPERLIPIDEMIA TYPE: ICD-10-CM

## 2023-12-05 DIAGNOSIS — I10 HYPERTENSION, UNSPECIFIED TYPE: ICD-10-CM

## 2023-12-05 DIAGNOSIS — E55.9 VITAMIN D DEFICIENCY: ICD-10-CM

## 2023-12-05 DIAGNOSIS — Z12.31 ENCOUNTER FOR SCREENING MAMMOGRAM FOR MALIGNANT NEOPLASM OF BREAST: ICD-10-CM

## 2023-12-05 DIAGNOSIS — M10.00 IDIOPATHIC GOUT, UNSPECIFIED CHRONICITY, UNSPECIFIED SITE: ICD-10-CM

## 2023-12-05 DIAGNOSIS — M54.17 LUMBOSACRAL RADICULOPATHY: ICD-10-CM

## 2023-12-05 DIAGNOSIS — F51.5 NIGHTMARE DISORDER: ICD-10-CM

## 2023-12-05 DIAGNOSIS — R73.01 IFG (IMPAIRED FASTING GLUCOSE): ICD-10-CM

## 2023-12-05 DIAGNOSIS — Z00.00 ANNUAL PHYSICAL EXAM: Primary | ICD-10-CM

## 2023-12-05 PROCEDURE — 99396 PREV VISIT EST AGE 40-64: CPT | Mod: S$GLB,,, | Performed by: INTERNAL MEDICINE

## 2023-12-05 PROCEDURE — 1160F RVW MEDS BY RX/DR IN RCRD: CPT | Mod: CPTII,S$GLB,, | Performed by: INTERNAL MEDICINE

## 2023-12-05 PROCEDURE — 3078F DIAST BP <80 MM HG: CPT | Mod: CPTII,S$GLB,, | Performed by: INTERNAL MEDICINE

## 2023-12-05 PROCEDURE — 1159F MED LIST DOCD IN RCRD: CPT | Mod: CPTII,S$GLB,, | Performed by: INTERNAL MEDICINE

## 2023-12-05 PROCEDURE — 3008F PR BODY MASS INDEX (BMI) DOCUMENTED: ICD-10-PCS | Mod: CPTII,S$GLB,, | Performed by: INTERNAL MEDICINE

## 2023-12-05 PROCEDURE — 3008F BODY MASS INDEX DOCD: CPT | Mod: CPTII,S$GLB,, | Performed by: INTERNAL MEDICINE

## 2023-12-05 PROCEDURE — 99396 PR PREVENTIVE VISIT,EST,40-64: ICD-10-PCS | Mod: S$GLB,,, | Performed by: INTERNAL MEDICINE

## 2023-12-05 PROCEDURE — 3074F PR MOST RECENT SYSTOLIC BLOOD PRESSURE < 130 MM HG: ICD-10-PCS | Mod: CPTII,S$GLB,, | Performed by: INTERNAL MEDICINE

## 2023-12-05 PROCEDURE — 4010F ACE/ARB THERAPY RXD/TAKEN: CPT | Mod: CPTII,S$GLB,, | Performed by: INTERNAL MEDICINE

## 2023-12-05 PROCEDURE — 1160F PR REVIEW ALL MEDS BY PRESCRIBER/CLIN PHARMACIST DOCUMENTED: ICD-10-PCS | Mod: CPTII,S$GLB,, | Performed by: INTERNAL MEDICINE

## 2023-12-05 PROCEDURE — 3074F SYST BP LT 130 MM HG: CPT | Mod: CPTII,S$GLB,, | Performed by: INTERNAL MEDICINE

## 2023-12-05 PROCEDURE — 4010F PR ACE/ARB THEARPY RXD/TAKEN: ICD-10-PCS | Mod: CPTII,S$GLB,, | Performed by: INTERNAL MEDICINE

## 2023-12-05 PROCEDURE — 1159F PR MEDICATION LIST DOCUMENTED IN MEDICAL RECORD: ICD-10-PCS | Mod: CPTII,S$GLB,, | Performed by: INTERNAL MEDICINE

## 2023-12-05 PROCEDURE — 99999 PR PBB SHADOW E&M-EST. PATIENT-LVL V: ICD-10-PCS | Mod: PBBFAC,,, | Performed by: INTERNAL MEDICINE

## 2023-12-05 PROCEDURE — 99999 PR PBB SHADOW E&M-EST. PATIENT-LVL V: CPT | Mod: PBBFAC,,, | Performed by: INTERNAL MEDICINE

## 2023-12-05 PROCEDURE — 3078F PR MOST RECENT DIASTOLIC BLOOD PRESSURE < 80 MM HG: ICD-10-PCS | Mod: CPTII,S$GLB,, | Performed by: INTERNAL MEDICINE

## 2023-12-05 RX ORDER — CYCLOBENZAPRINE HCL 10 MG
10 TABLET ORAL 3 TIMES DAILY PRN
Qty: 30 TABLET | Refills: 3 | Status: SHIPPED | OUTPATIENT
Start: 2023-12-05 | End: 2023-12-15

## 2023-12-05 RX ORDER — METHYLPREDNISOLONE 4 MG/1
TABLET ORAL
Qty: 21 EACH | Refills: 0 | Status: SHIPPED | OUTPATIENT
Start: 2023-12-05 | End: 2023-12-26

## 2023-12-05 NOTE — PROGRESS NOTES
Subjective:   Patient ID: Lashae Colvin is a 53 y.o. female  Chief complaint:   Chief Complaint   Patient presents with    Annual Exam       HPI   Here for annual exam     Having intermittent periods over the past year   LMP 8/3/2023    Exercising reg  PT exercises   Walking on lunches   Diet ok     Injury in August - picked up suitcase - twisted and swung and caused severe back pain - missed a couple of days of work   More lower back pain since then but not as severe as initial event   No incont, weakn, new numbness  Healed from hip surgery   Taking gpn 600mg nightly - would like to hold off on inc dose for now   Prev:  Was taking tid after surgery and too much and now at 300mg bid for sciatica in afternoon   100mg in am made her too tired during the day     Right shoulder pain - pain with putting on bra or abduction:  Largely improved today   prev:    Left shoulder xray unremarakble in past   Having B neck tightness and pain at times and radiates down B arms right > left with numbness down to fingers at times   - works long hours in front of computer     HLD:  - jenn crestor   - declined statin in past 2015 and addressed through lifestyle     HTN:   - controlled on lisinopril  - did not jenn losartan in past   - no significant cough at this time   Prev:   - occ cough with no trigger and c/f se of acei but does not want to change bp med   - checked bp at home 110s/70s  - has 2 bp cuffs and they are consistent   - exercising  - gpn helpful for sleep    Gout:    - no recent flares   - Previously followed by rheum for gout   - taking allopurinal 300mg once daily and well controlled  - no flares over > 2-3 years   - UA level was at goal upon last check     Nightmare disorder:   - Taking prazosin  - followed by sleep   - sx stable     Has chronic intermittent right > left lower back pain with sciatica present for > 3-4 years  Today: sciatica is managable but still bothersome and present - see above   - taking gpn  "as above   - had MRI 6/2021  - tried ibuprofen with pepcid and mobic - mobic helpful   - will take mm relaxer prn   Previously:    - exercising and still with pain - sciatica bilaterally now R > L  - attended PT for hip in past and cervical spine    - same features as below when reported at [facility] but now daily and constant    - tried pilates, PT stretches, ibuprofen 800mg, naprosyn 2 otc, methocarbamol, flexeril    - taking gpn 300mg at night and helpful   - no weakness or incontinence   - still with numbness in 2nd digit of bilateral feet   - worse with sitting or bending  + mm spasms with this   Previously:   -  pain improved previously with inc exercise and stretching, weights and pilates  - having good and bad days     - radiates down post leg and lateral aspect   - numbness in 2nd digit of foot Bilaterally   - never attended pt for lower back   - no weakness or incontinence   - tried gpn and intermittently helpful        Vit d def:   Level 54 [facility] <- 39 <-19 <- 25   Taking calcium with vit d ? Dose  Taking calcium and b complex     Gyn:  - Periods are intermittent over past year   - utd with gyn     HM:  cscope - declines and already has kit at home to complete    Review of Systems    Objective:  Vitals:    12/05/23 1347   BP: 120/74   BP Location: Left arm   Patient Position: Sitting   Pulse: 100   SpO2: 98%   Weight: 79 kg (174 lb 2.6 oz)   Height: 5' 6" (1.676 m)     Body mass index is 28.11 kg/m².    Physical Exam  Vitals reviewed.   Constitutional:       Appearance: Normal appearance. She is well-developed.   HENT:      Head: Normocephalic and atraumatic.      Right Ear: Tympanic membrane, ear canal and external ear normal.      Left Ear: Tympanic membrane, ear canal and external ear normal.      Nose: Nose normal. No congestion.      Mouth/Throat:      Mouth: Mucous membranes are moist.      Pharynx: Oropharynx is clear. No oropharyngeal exudate.   Eyes:      Extraocular " Movements: Extraocular movements intact.      Conjunctiva/sclera: Conjunctivae normal.   Neck:      Thyroid: No thyromegaly.   Cardiovascular:      Rate and Rhythm: Normal rate and regular rhythm.      Pulses: Normal pulses.      Heart sounds: Normal heart sounds.   Pulmonary:      Effort: Pulmonary effort is normal.      Breath sounds: Normal breath sounds.   Abdominal:      General: Bowel sounds are normal.      Palpations: Abdomen is soft.   Musculoskeletal:         General: No swelling or tenderness.      Cervical back: Neck supple.      Comments: No ttp of spine   Strength 5/5 B LE      Lymphadenopathy:      Cervical: No cervical adenopathy.   Skin:     General: Skin is warm and dry.      Capillary Refill: Capillary refill takes less than 2 seconds.   Neurological:      General: No focal deficit present.      Mental Status: She is alert and oriented to person, place, and time. Mental status is at baseline.      Cranial Nerves: No cranial nerve deficit.      Deep Tendon Reflexes:      Reflex Scores:       Patellar reflexes are 2+ on the right side and 2+ on the left side.       Achilles reflexes are 2+ on the right side and 2+ on the left side.  Psychiatric:         Behavior: Behavior normal.         Thought Content: Thought content normal.     Assessment:  1. Annual physical exam    2. IFG (impaired fasting glucose)    3. Hypertension, unspecified type    4. Hyperlipidemia, unspecified hyperlipidemia type    5. Idiopathic gout, unspecified chronicity, unspecified site    6. Lumbosacral radiculopathy    7. Menopausal problem    8. Vitamin D deficiency    9. Encounter for screening mammogram for malignant neoplasm of breast    10. Screening for colon cancer    11. Nightmare disorder        Plan:  Lashae was seen today for annual exam.    Diagnoses and all orders for this visit:    Annual physical exam  -     Vitamin D; Future  -     Hemoglobin A1C; Future  -     TSH; Future  -     Lipid Panel; Future  -     CBC  Auto Differential; Future  -     Comprehensive Metabolic Panel; Future  Recommend daily sunscreen, cardiovascular exercise min 30 min 5 days per week. Seatbelts routinely.    IFG (impaired fasting glucose)  -     Hemoglobin A1C; Future  Stable   Update A1c  Cont ls mgmt     Hypertension, unspecified type  Stable   Cont med     Hyperlipidemia, unspecified hyperlipidemia type  Stable   Cont med     Idiopathic gout, unspecified chronicity, unspecified site  Stable   Low purine diet     Lumbosacral radiculopathy  -     X-Ray Lumbar Complete Including Flex And Ext; Future  -     Ambulatory referral/consult to Pain Clinic; Future  -     methylPREDNISolone (MEDROL DOSEPACK) 4 mg tablet; use as directed  -     cyclobenzaprine (FLEXERIL) 10 MG tablet; Take 1 tablet (10 mg total) by mouth 3 (three) times daily as needed for Muscle spasms.  Update xray   Medrol dose pack   Mm relaxer prn     Menopausal problem  -     ESTRADIOL; Future  -     FOLLICLE STIMULATING HORMONE; Future    Vitamin D deficiency  -     Vitamin D; Future    Encounter for screening mammogram for malignant neoplasm of breast  -     Mammo Digital Screening Bilat w/ Prakash; Future    Screening for colon cancer  -     Fecal Immunochemical Test (iFOBT); Future    Nightmare disorder  Stable cont med     Cont statin - she is concerned about ifg - discussed can dec dose, take qod or switch to pravastatin in future pending clinical course - after discussion will remain on current med     Recommend daily sunscreen, cardiovascular exercise min 30 min 5 days per week. Seatbelts routinely.    Health Maintenance   Topic Date Due    Colorectal Cancer Screening  09/05/2023    Mammogram  12/11/2024    Lipid Panel  05/05/2028    TETANUS VACCINE  11/13/2029    Hepatitis C Screening  Completed    Shingles Vaccine  Completed

## 2023-12-06 ENCOUNTER — TELEPHONE (OUTPATIENT)
Dept: ORTHOPEDICS | Facility: CLINIC | Age: 53
End: 2023-12-06
Payer: COMMERCIAL

## 2023-12-06 ENCOUNTER — HOSPITAL ENCOUNTER (OUTPATIENT)
Dept: RADIOLOGY | Facility: OTHER | Age: 53
Discharge: HOME | End: 2023-12-06
Attending: INTERNAL MEDICINE
Payer: COMMERCIAL

## 2023-12-06 DIAGNOSIS — M54.17 LUMBOSACRAL RADICULOPATHY: ICD-10-CM

## 2023-12-06 PROCEDURE — 72114 X-RAY EXAM L-S SPINE BENDING: CPT | Mod: 26,,, | Performed by: STUDENT IN AN ORGANIZED HEALTH CARE EDUCATION/TRAINING PROGRAM

## 2023-12-06 PROCEDURE — 72114 X-RAY EXAM L-S SPINE BENDING: CPT | Mod: TC,FY

## 2023-12-06 PROCEDURE — 72114 XR LUMBAR SPINE 5 VIEW WITH FLEX AND EXT: ICD-10-PCS | Mod: 26,,, | Performed by: STUDENT IN AN ORGANIZED HEALTH CARE EDUCATION/TRAINING PROGRAM

## 2023-12-11 ENCOUNTER — HOSPITAL ENCOUNTER (OUTPATIENT)
Dept: RADIOLOGY | Facility: HOSPITAL | Age: 53
Discharge: HOME | End: 2023-12-11
Attending: INTERNAL MEDICINE
Payer: COMMERCIAL

## 2023-12-11 DIAGNOSIS — Z12.31 ENCOUNTER FOR SCREENING MAMMOGRAM FOR MALIGNANT NEOPLASM OF BREAST: ICD-10-CM

## 2023-12-11 PROBLEM — E55.9 VITAMIN D DEFICIENCY: Status: ACTIVE | Noted: 2023-12-11

## 2023-12-11 PROCEDURE — 77067 SCR MAMMO BI INCL CAD: CPT | Mod: TC

## 2023-12-11 PROCEDURE — 77063 MAMMO DIGITAL SCREENING BILAT WITH TOMO: ICD-10-PCS | Mod: 26,,, | Performed by: RADIOLOGY

## 2023-12-11 PROCEDURE — 77067 MAMMO DIGITAL SCREENING BILAT WITH TOMO: ICD-10-PCS | Mod: 26,,, | Performed by: RADIOLOGY

## 2023-12-11 PROCEDURE — 77063 BREAST TOMOSYNTHESIS BI: CPT | Mod: 26,,, | Performed by: RADIOLOGY

## 2023-12-11 PROCEDURE — 77067 SCR MAMMO BI INCL CAD: CPT | Mod: 26,,, | Performed by: RADIOLOGY

## 2023-12-12 DIAGNOSIS — M43.16 ANTEROLISTHESIS OF LUMBAR SPINE: Primary | ICD-10-CM

## 2024-01-17 ENCOUNTER — TELEPHONE (OUTPATIENT)
Dept: INTERNAL MEDICINE | Facility: CLINIC | Age: 54
End: 2024-01-17
Payer: COMMERCIAL

## 2024-02-11 DIAGNOSIS — M10.9 GOUT, UNSPECIFIED CAUSE, UNSPECIFIED CHRONICITY, UNSPECIFIED SITE: ICD-10-CM

## 2024-02-11 NOTE — TELEPHONE ENCOUNTER
Care Due:                  Date            Visit Type   Department     Provider  --------------------------------------------------------------------------------                                MYCHART                              ANNUAL                              CHECKUP/PHY  [facility] INTERNAL  Last Visit: 12-      [facility]       Kristen Abreu  Next Visit: None Scheduled  None         None Found                                                            Last  Test          Frequency    Reason                     Performed    Due Date  --------------------------------------------------------------------------------    CBC.........  12 months..  allopurinoL..............  08- 08-    CMP.........  12 months..  allopurinoL, lisinopriL,   08- 08-                             rosuvastatin.............    Lipid Panel.  12 months..  rosuvastatin.............  08- 04-    Uric Acid...  12 months..  allopurinoL..............  08- 08-    [facility] Embedded Care Due Messages. Reference number: [id number].   2/11/2024 8:05:08 AM CST

## 2024-02-12 RX ORDER — ALLOPURINOL 300 MG/1
300 TABLET ORAL
Qty: 90 TABLET | Refills: 3 | Status: SHIPPED | OUTPATIENT
Start: 2024-02-12

## 2024-02-12 NOTE — TELEPHONE ENCOUNTER
Refill Routing Note   Medication(s) are not appropriate for processing by [facility] for the following reason(s):        Required labs outdated    [facility] action(s):  Defer   Requires labs : Yes               Appointments  past 12m or future 3m with PCP    Date Provider   Last Visit   12/5/2023 Kristen Abreu MD   Next Visit   Visit date not found Kristen Abreu MD   ED visits in past 90 days: 0        Note composed:10:05 AM 02/12/2024

## 2024-03-27 ENCOUNTER — PATIENT MESSAGE (OUTPATIENT)
Dept: INTERNAL MEDICINE | Facility: CLINIC | Age: 54
End: 2024-03-27
Payer: COMMERCIAL

## 2024-03-27 DIAGNOSIS — N95.9 MENOPAUSAL PROBLEM: ICD-10-CM

## 2024-03-27 DIAGNOSIS — Z13.820 SCREENING FOR OSTEOPOROSIS: Primary | ICD-10-CM

## 2024-04-04 LAB — BMD RECOMMENDATION EXT: NORMAL

## 2024-05-14 ENCOUNTER — TELEPHONE (OUTPATIENT)
Dept: INTERNAL MEDICINE | Facility: CLINIC | Age: 54
End: 2024-05-14
Payer: COMMERCIAL

## 2024-05-14 DIAGNOSIS — M85.851 OSTEOPENIA OF NECK OF RIGHT FEMUR: Primary | ICD-10-CM

## 2024-05-14 NOTE — TELEPHONE ENCOUNTER
Please let pt know that I received her bone density result from [facility]  - it showed osteopenia which is painless thinning of the bones between normal range and more severe osteoporosis.  - this is present in right femoral neck and left forearm.     It is recommended to:   Take an over the counter supplement of calcium 1200mg divided into 2 doses daily  Take 2000u of vitamin D3 daily   Exercise regularly and include weight bearing exercises   Repeat bone density in 2 years for continued monitoring of this

## 2024-06-20 ENCOUNTER — PATIENT OUTREACH (OUTPATIENT)
Dept: ADMINISTRATIVE | Facility: HOSPITAL | Age: 54
End: 2024-06-20
Payer: COMMERCIAL

## 2024-06-20 NOTE — PROGRESS NOTES
Health Maintenance Due   Topic Date Due    Hemoglobin A1c (Prediabetes)  08/17/2023    Colorectal Cancer Screening  09/05/2023    Cervical Cancer Screening  06/17/2024    Health maintenance reviewed, updated and links triggered. Dexa scan uploaded into chart. Portal message   Sent for scheduling colorectal and cervical exam. ([place]ford) 6/20/24

## 2024-07-22 ENCOUNTER — PATIENT OUTREACH (OUTPATIENT)
Dept: INTERNAL MEDICINE | Facility: CLINIC | Age: 54
End: 2024-07-22
Payer: COMMERCIAL

## 2024-07-22 DIAGNOSIS — E78.5 HYPERLIPIDEMIA, UNSPECIFIED HYPERLIPIDEMIA TYPE: ICD-10-CM

## 2024-07-23 RX ORDER — ROSUVASTATIN CALCIUM 20 MG/1
20 TABLET, COATED ORAL DAILY
Qty: 90 TABLET | Refills: 0 | Status: SHIPPED | OUTPATIENT
Start: 2024-07-23

## 2024-07-23 NOTE — TELEPHONE ENCOUNTER
Refill Routing Note   Medication(s) are not appropriate for processing by [facility] for the following reason(s):        Required labs outdated    [facility] action(s):  Defer   Requires labs : Yes             Appointments  past 12m or future 3m with PCP    Date Provider   Last Visit   12/5/2023 Kristen Abreu MD   Next Visit   Visit date not found Kristen Abreu MD   ED visits in past 90 days: 0        Note composed:8:08 AM 07/23/2024

## 2024-07-23 NOTE — TELEPHONE ENCOUNTER
Care Due:                  Date            Visit Type   Department     Provider  --------------------------------------------------------------------------------                                MYCHART                              ANNUAL                              CHECKUP/PHY  [facility] INTERNAL  Last Visit: 12-      [facility]       Kristen Abreu  Next Visit: None Scheduled  None         None Found                                                            Last  Test          Frequency    Reason                     Performed    Due Date  --------------------------------------------------------------------------------    CBC.........  12 months..  allopurinoL..............  08- 08-    CMP.........  12 months..  allopurinoL, lisinopriL,   08- 08-                             rosuvastatin.............    Lipid Panel.  12 months..  rosuvastatin.............  08- 04-    Uric Acid...  12 months..  allopurinoL..............  08- 08-    [facility] Embedded Care Due Messages. Reference number: [id number].   7/22/2024 9:11:46 PM CDT

## 2024-07-23 NOTE — TELEPHONE ENCOUNTER
Refill sent in however due for labs asap that we ordered 12/2023 - please arrange this week fasting

## 2024-08-06 ENCOUNTER — PATIENT MESSAGE (OUTPATIENT)
Dept: ADMINISTRATIVE | Facility: HOSPITAL | Age: 54
End: 2024-08-06
Payer: COMMERCIAL

## 2024-08-07 DIAGNOSIS — Z12.11 SCREENING FOR COLON CANCER: ICD-10-CM

## 2024-09-17 ENCOUNTER — OFFICE VISIT (OUTPATIENT)
Dept: INTERNAL MEDICINE | Facility: CLINIC | Age: 54
End: 2024-09-17
Attending: INTERNAL MEDICINE
Payer: COMMERCIAL

## 2024-09-17 ENCOUNTER — LAB VISIT (OUTPATIENT)
Dept: LAB | Facility: OTHER | Age: 54
End: 2024-09-17
Attending: INTERNAL MEDICINE
Payer: COMMERCIAL

## 2024-09-17 VITALS
HEIGHT: 66 IN | WEIGHT: 175.25 LBS | DIASTOLIC BLOOD PRESSURE: 70 MMHG | HEART RATE: 91 BPM | BODY MASS INDEX: 28.16 KG/M2 | SYSTOLIC BLOOD PRESSURE: 114 MMHG | OXYGEN SATURATION: 98 %

## 2024-09-17 DIAGNOSIS — R73.01 IFG (IMPAIRED FASTING GLUCOSE): ICD-10-CM

## 2024-09-17 DIAGNOSIS — I10 HYPERTENSION, UNSPECIFIED TYPE: ICD-10-CM

## 2024-09-17 DIAGNOSIS — Z00.00 ANNUAL PHYSICAL EXAM: ICD-10-CM

## 2024-09-17 DIAGNOSIS — Z01.419 VISIT FOR PELVIC EXAM: ICD-10-CM

## 2024-09-17 DIAGNOSIS — G89.29 CHRONIC BILATERAL LOW BACK PAIN, UNSPECIFIED WHETHER SCIATICA PRESENT: ICD-10-CM

## 2024-09-17 DIAGNOSIS — Z00.00 ANNUAL PHYSICAL EXAM: Primary | ICD-10-CM

## 2024-09-17 DIAGNOSIS — M10.00 IDIOPATHIC GOUT, UNSPECIFIED CHRONICITY, UNSPECIFIED SITE: ICD-10-CM

## 2024-09-17 DIAGNOSIS — M54.50 CHRONIC BILATERAL LOW BACK PAIN, UNSPECIFIED WHETHER SCIATICA PRESENT: ICD-10-CM

## 2024-09-17 DIAGNOSIS — E78.5 HYPERLIPIDEMIA, UNSPECIFIED HYPERLIPIDEMIA TYPE: ICD-10-CM

## 2024-09-17 DIAGNOSIS — F51.5 NIGHTMARE DISORDER: ICD-10-CM

## 2024-09-17 DIAGNOSIS — M85.851 OSTEOPENIA OF NECK OF RIGHT FEMUR: ICD-10-CM

## 2024-09-17 DIAGNOSIS — Z12.31 ENCOUNTER FOR SCREENING MAMMOGRAM FOR MALIGNANT NEOPLASM OF BREAST: ICD-10-CM

## 2024-09-17 DIAGNOSIS — E55.9 VITAMIN D DEFICIENCY: ICD-10-CM

## 2024-09-17 DIAGNOSIS — N95.9 MENOPAUSAL PROBLEM: ICD-10-CM

## 2024-09-17 LAB
25(OH)D3+25(OH)D2 SERPL-MCNC: 31 NG/ML (ref 30–96)
ALBUMIN SERPL BCP-MCNC: 4.4 G/DL (ref 3.5–5.2)
ALP SERPL-CCNC: 68 U/L (ref 55–135)
ALT SERPL W/O P-5'-P-CCNC: 21 U/L (ref 10–44)
ANION GAP SERPL CALC-SCNC: 10 MMOL/L (ref 8–16)
AST SERPL-CCNC: 17 U/L (ref 10–40)
BASOPHILS # BLD AUTO: 0.04 K/UL (ref 0–0.2)
BASOPHILS NFR BLD: 0.7 % (ref 0–1.9)
BILIRUB SERPL-MCNC: 0.4 MG/DL (ref 0.1–1)
BUN SERPL-MCNC: 18 MG/DL (ref 6–20)
CALCIUM SERPL-MCNC: 9.8 MG/DL (ref 8.7–10.5)
CHLORIDE SERPL-SCNC: 106 MMOL/L (ref 95–110)
CHOLEST SERPL-MCNC: 226 MG/DL (ref 120–199)
CHOLEST/HDLC SERPL: 3 {RATIO} (ref 2–5)
CO2 SERPL-SCNC: 25 MMOL/L (ref 23–29)
CREAT SERPL-MCNC: 0.8 MG/DL (ref 0.5–1.4)
DIFFERENTIAL METHOD BLD: ABNORMAL
EOSINOPHIL # BLD AUTO: 0.1 K/UL (ref 0–0.5)
EOSINOPHIL NFR BLD: 2.1 % (ref 0–8)
ERYTHROCYTE [DISTWIDTH] IN BLOOD BY AUTOMATED COUNT: 13.5 % (ref 11.5–14.5)
EST. GFR  (NO RACE VARIABLE): >60 ML/MIN/1.73 M^2
ESTIMATED AVG GLUCOSE: 108 MG/DL (ref 68–131)
ESTRADIOL SERPL-MCNC: <10 PG/ML
FSH SERPL-ACNC: 64.6 MIU/ML
GLUCOSE SERPL-MCNC: 113 MG/DL (ref 70–110)
HBA1C MFR BLD: 5.4 % (ref 4–5.6)
HCT VFR BLD AUTO: 41 % (ref 37–48.5)
HDLC SERPL-MCNC: 75 MG/DL (ref 40–75)
HDLC SERPL: 33.2 % (ref 20–50)
HGB BLD-MCNC: 13.2 G/DL (ref 12–16)
IMM GRANULOCYTES # BLD AUTO: 0.03 K/UL (ref 0–0.04)
IMM GRANULOCYTES NFR BLD AUTO: 0.5 % (ref 0–0.5)
LDLC SERPL CALC-MCNC: 98.2 MG/DL (ref 63–159)
LYMPHOCYTES # BLD AUTO: 1.5 K/UL (ref 1–4.8)
LYMPHOCYTES NFR BLD: 25.8 % (ref 18–48)
MCH RBC QN AUTO: 29.1 PG (ref 27–31)
MCHC RBC AUTO-ENTMCNC: 32.2 G/DL (ref 32–36)
MCV RBC AUTO: 91 FL (ref 82–98)
MONOCYTES # BLD AUTO: 0.4 K/UL (ref 0.3–1)
MONOCYTES NFR BLD: 6.8 % (ref 4–15)
NEUTROPHILS # BLD AUTO: 3.7 K/UL (ref 1.8–7.7)
NEUTROPHILS NFR BLD: 64.1 % (ref 38–73)
NONHDLC SERPL-MCNC: 151 MG/DL
NRBC BLD-RTO: 0 /100 WBC
PLATELET # BLD AUTO: 236 K/UL (ref 150–450)
PMV BLD AUTO: 8.9 FL (ref 9.2–12.9)
POTASSIUM SERPL-SCNC: 4.7 MMOL/L (ref 3.5–5.1)
PROT SERPL-MCNC: 7.4 G/DL (ref 6–8.4)
RBC # BLD AUTO: 4.53 M/UL (ref 4–5.4)
SODIUM SERPL-SCNC: 141 MMOL/L (ref 136–145)
TRIGL SERPL-MCNC: 264 MG/DL (ref 30–150)
TSH SERPL DL<=0.005 MIU/L-ACNC: 1.85 UIU/ML (ref 0.4–4)
WBC # BLD AUTO: 5.74 K/UL (ref 3.9–12.7)

## 2024-09-17 PROCEDURE — 3078F DIAST BP <80 MM HG: CPT | Mod: CPTII,S$GLB,, | Performed by: INTERNAL MEDICINE

## 2024-09-17 PROCEDURE — 99396 PREV VISIT EST AGE 40-64: CPT | Mod: S$GLB,,, | Performed by: INTERNAL MEDICINE

## 2024-09-17 PROCEDURE — 4010F ACE/ARB THERAPY RXD/TAKEN: CPT | Mod: CPTII,S$GLB,, | Performed by: INTERNAL MEDICINE

## 2024-09-17 PROCEDURE — 3044F HG A1C LEVEL LT 7.0%: CPT | Mod: CPTII,S$GLB,, | Performed by: INTERNAL MEDICINE

## 2024-09-17 PROCEDURE — 83001 ASSAY OF GONADOTROPIN (FSH): CPT | Performed by: INTERNAL MEDICINE

## 2024-09-17 PROCEDURE — 83036 HEMOGLOBIN GLYCOSYLATED A1C: CPT | Performed by: INTERNAL MEDICINE

## 2024-09-17 PROCEDURE — 80053 COMPREHEN METABOLIC PANEL: CPT | Performed by: INTERNAL MEDICINE

## 2024-09-17 PROCEDURE — 80061 LIPID PANEL: CPT | Performed by: INTERNAL MEDICINE

## 2024-09-17 PROCEDURE — 84443 ASSAY THYROID STIM HORMONE: CPT | Performed by: INTERNAL MEDICINE

## 2024-09-17 PROCEDURE — 99999 PR PBB SHADOW E&M-EST. PATIENT-LVL IV: CPT | Mod: PBBFAC,,, | Performed by: INTERNAL MEDICINE

## 2024-09-17 PROCEDURE — 82306 VITAMIN D 25 HYDROXY: CPT | Performed by: INTERNAL MEDICINE

## 2024-09-17 PROCEDURE — 85025 COMPLETE CBC W/AUTO DIFF WBC: CPT | Performed by: INTERNAL MEDICINE

## 2024-09-17 PROCEDURE — 1160F RVW MEDS BY RX/DR IN RCRD: CPT | Mod: CPTII,S$GLB,, | Performed by: INTERNAL MEDICINE

## 2024-09-17 PROCEDURE — 82670 ASSAY OF TOTAL ESTRADIOL: CPT | Performed by: INTERNAL MEDICINE

## 2024-09-17 PROCEDURE — 3008F BODY MASS INDEX DOCD: CPT | Mod: CPTII,S$GLB,, | Performed by: INTERNAL MEDICINE

## 2024-09-17 PROCEDURE — 36415 COLL VENOUS BLD VENIPUNCTURE: CPT | Performed by: INTERNAL MEDICINE

## 2024-09-17 PROCEDURE — 1159F MED LIST DOCD IN RCRD: CPT | Mod: CPTII,S$GLB,, | Performed by: INTERNAL MEDICINE

## 2024-09-17 PROCEDURE — 3074F SYST BP LT 130 MM HG: CPT | Mod: CPTII,S$GLB,, | Performed by: INTERNAL MEDICINE

## 2024-09-17 RX ORDER — CYCLOBENZAPRINE HCL 10 MG
10 TABLET ORAL
COMMUNITY
Start: 2024-08-27

## 2024-09-17 RX ORDER — GABAPENTIN 100 MG/1
CAPSULE ORAL
Qty: 180 CAPSULE | Refills: 3 | Status: SHIPPED | OUTPATIENT
Start: 2024-09-17

## 2024-09-17 RX ORDER — GABAPENTIN 300 MG/1
600 CAPSULE ORAL NIGHTLY
Qty: 180 CAPSULE | Refills: 3 | Status: SHIPPED | OUTPATIENT
Start: 2024-09-17 | End: 2024-12-16

## 2024-09-17 NOTE — PROGRESS NOTES
"Subjective:   Patient ID: Lashae Colvin is a 54 y.o. female  Chief complaint:   Chief Complaint   Patient presents with    Annual Exam       HPI   Here for annual exam   Due for labs ordered 12/2023    Due for dental appt    Astigmatism, PVD, fam hx of macular degeneration:   Left eye vision changes - has opt f/u scheduled next week for dilated exam    Osteopenia:  Completed dexa at [facility] this year   "Please let patient know that I received her bone density result from [facility]  - it showed osteopenia which is painless thinning of the bones between normal range and more severe osteoporosis.  - this is present in right femoral neck and left forearm.      It is recommended to:   Take an over the counter supplement of calcium 1200mg divided into 2 doses daily  Take 2000u of vitamin D3 daily   Exercise regularly and include weight bearing exercises   Repeat bone density in 2 years for continued monitoring of this"     Promotion at work     To complete fitkit    + hot flashes   Lmp August 3, 2023   At [facility]:   Having intermittent periods over the past year   LMP 8/3/2023    Chronic back pain, sciatica:   Taking 600mg of gpn at night   Managing pian and improved   More walking and ambulatory during the day   Will use mm relaxer prn   Ice, stretches   Previously:   Exercising reg  PT exercises   Walking on lunches   Diet ok   At v:   Injury in August - picked up suitcase - twisted and swung and caused severe back pain - missed a couple of days of work   More lower back pain since then but not as severe as initial event   No incont, weakn, new numbness  Healed from hip surgery   Taking gpn 600mg nightly - would like to hold off on inc dose for now   Prev:  Was taking tid after surgery and too much and now at 300mg bid for sciatica in afternoon   100mg in am made her too tired during the day     Right shoulder pain - pain with putting on bra or abduction:  Largely improved today   prev:    Left shoulder xray unremarakble in " past   Having B neck tightness and pain at times and radiates down B arms right > left with numbness down to fingers at times   - works long hours in front of computer     HLD:  - jenn crestor   - declined statin in past 2015 and addressed through lifestyle     HTN:   - controlled on lisinopril - fluctuating   - off of prazosin over past few months   - higher readings at home - managing stress   - did not jenn losartan in past   - no significant cough at this time   Prev:   - occ cough with no trigger and c/f se of acei but does not want to change bp med   - checked bp at home 110s/70s  - has 2 bp cuffs and they are consistent   - exercising  - gpn helpful for sleep    Gout:    - no recent flares   - Previously followed by rheum for gout   - taking allopurinal 300mg once daily and well controlled  - no flares over > 2-3 years   - UA level was at goal upon last check     Nightmare disorder:   -  no longer taking prazosin  - followed by sleep   - sx stable     Has chronic intermittent right > left lower back pain with sciatica present for > 3-4 years  Today: sciatica is managable but still bothersome and present - see above   - taking gpn as above   - had MRI 6/2021  - tried ibuprofen with pepcid and mobic - mobic helpful   - will take mm relaxer prn   Previously:    - exercising and still with pain - sciatica bilaterally now R > L  - attended PT for hip in past and cervical spine    - same features as below when reported at [facility] but now daily and constant    - tried pilates, PT stretches, ibuprofen 800mg, naprosyn 2 otc, methocarbamol, flexeril    - taking gpn 300mg at night and helpful   - no weakness or incontinence   - still with numbness in 2nd digit of bilateral feet   - worse with sitting or bending  + mm spasms with this   Previously:   -  pain improved previously with inc exercise and stretching, weights and pilates  - having good and bad days     - radiates down post leg and lateral aspect   - numbness in 2nd  "digit of foot Bilaterally   - never attended pt for lower back   - no weakness or incontinence   - tried gpn and intermittently helpful        Vit d def:   Ran out for a couple of week and just resumed  Taking 2000u daily   Previously:   Level 54 [facility] <- 39 <-19 <- 25   Taking calcium with vit d ? Dose    Taking calcium and b12 suppl     Gyn:  - no period for > 1 year   - due for gyn    HM:  cscope - declines and already has kit at home to complete and submit today   Flu vaccine - through work   Labs to be completed   pap    Review of Systems  114/70    Objective:  Vitals:    09/17/24 1032 09/17/24 1134   BP: 130/86 114/70   BP Location: Left arm    Patient Position: Sitting    Pulse: 91    SpO2: 98%    Weight: 79.5 kg (175 lb 4.3 oz)    Height: 5' 6" (1.676 m)      Body mass index is 28.29 kg/m².    Physical Exam  Vitals reviewed.   Constitutional:       Appearance: Normal appearance. She is well-developed.   HENT:      Head: Normocephalic and atraumatic.      Right Ear: Tympanic membrane, ear canal and external ear normal.      Left Ear: Tympanic membrane, ear canal and external ear normal.      Nose: Nose normal. No congestion.      Mouth/Throat:      Mouth: Mucous membranes are moist.      Pharynx: Oropharynx is clear. No oropharyngeal exudate.   Eyes:      Extraocular Movements: Extraocular movements intact.      Conjunctiva/sclera: Conjunctivae normal.   Neck:      Thyroid: No thyromegaly.   Cardiovascular:      Rate and Rhythm: Normal rate and regular rhythm.      Pulses: Normal pulses.      Heart sounds: Normal heart sounds.   Pulmonary:      Effort: Pulmonary effort is normal.      Breath sounds: Normal breath sounds.   Abdominal:      General: Bowel sounds are normal.      Palpations: Abdomen is soft.   Musculoskeletal:         General: No swelling or tenderness.      Cervical back: Neck supple.      Comments: No ttp of spine   Strength 5/5 B LE   Lymphadenopathy:      Cervical: " No cervical adenopathy.   Skin:     General: Skin is warm and dry.      Capillary Refill: Capillary refill takes less than 2 seconds.   Neurological:      General: No focal deficit present.      Mental Status: She is alert and oriented to person, place, and time. Mental status is at baseline.   Psychiatric:         Behavior: Behavior normal.         Thought Content: Thought content normal.       Assessment:  1. Annual physical exam    2. Nightmare disorder    3. Visit for pelvic exam    4. Encounter for screening mammogram for malignant neoplasm of breast    5. Osteopenia of neck of right femur    6. Hypertension, unspecified type    7. Hyperlipidemia, unspecified hyperlipidemia type    8. Chronic bilateral low back pain, unspecified whether sciatica present    9. Idiopathic gout, unspecified chronicity, unspecified site      Plan:  Annual physical exam    Nightmare disorder  -     gabapentin (NEURONTIN) 100 MG capsule; Take 100mg by mouth every afternoon for 1 week and then increase to 200mg every afternoon. Continue 600mg nightly prescription dose  Dispense: 180 capsule; Refill: 3  -     gabapentin (NEURONTIN) 300 MG capsule; Take 2 capsules (600 mg total) by mouth every evening.  Dispense: 180 capsule; Refill: 3    Visit for pelvic exam  -     Ambulatory referral/consult to Obstetrics / Gynecology; Future; Expected date: 09/24/2024    Encounter for screening mammogram for malignant neoplasm of breast  -     Mammo Digital Screening Bilat w/ Prakash; Future; Expected date: 12/17/2024    Osteopenia of neck of right femur    Hypertension, unspecified type    Hyperlipidemia, unspecified hyperlipidemia type    Chronic bilateral low back pain, unspecified whether sciatica present    Idiopathic gout, unspecified chronicity, unspecified site    Recommend daily sunscreen, cardiovascular exercise min 30 min 5 days per week. Seatbelts routinely.  Reviewed recommended health maintenance and recommended vaccines   Check/reviewed  appropriate labs     Cont current meds and supplements     Health Maintenance   Topic Date Due    Colorectal Cancer Screening  09/05/2023    Mammogram  12/11/2024    DEXA Scan  04/04/2026    Lipid Panel  09/17/2029    TETANUS VACCINE  11/13/2029    Hepatitis C Screening  Completed    Shingles Vaccine  Completed

## 2024-09-20 ENCOUNTER — TELEPHONE (OUTPATIENT)
Dept: OPTOMETRY | Facility: CLINIC | Age: 54
End: 2024-09-20
Payer: COMMERCIAL

## 2024-09-20 NOTE — TELEPHONE ENCOUNTER
"Spoke with patient advise she will be dilated at her visit. Pt states that she has notice vision changes, left eye states that eyes run water and feel a FB sensation. Advise to use Systane or Refresh AT's TID to see if vision improves. Pt appt is schedule for 9/25/2024.     ----- Message from Fernando Bergman sent at 9/20/2024  2:37 PM CDT -----  Consult/Advisory:    Name Of Caller: Self    Contact Preference?: [phone]    Provider Name: Debbie    What is the nature of the call?: Requesting clarification about what 9/25 visit will consist of    Additional Notes:  "Thank you for all that you do for our patients"  "

## 2024-09-22 PROBLEM — E78.5 HYPERLIPIDEMIA: Status: RESOLVED | Noted: 2018-09-26 | Resolved: 2024-09-22

## 2024-10-22 ENCOUNTER — OFFICE VISIT (OUTPATIENT)
Dept: OPTOMETRY | Facility: CLINIC | Age: 54
End: 2024-10-22
Payer: COMMERCIAL

## 2024-10-22 DIAGNOSIS — H35.412 LATTICE DEGENERATION OF LEFT RETINA: ICD-10-CM

## 2024-10-22 DIAGNOSIS — Z01.00 EYE EXAM, ROUTINE: Primary | ICD-10-CM

## 2024-10-22 DIAGNOSIS — H25.013 CATARACT CORTICAL, SENILE, BILATERAL: ICD-10-CM

## 2024-10-22 DIAGNOSIS — H43.813 VITREOUS DETACHMENT OF BOTH EYES: ICD-10-CM

## 2024-10-22 DIAGNOSIS — Q14.1 CONGENITAL HYPERTROPHY OF RETINAL PIGMENT EPITHELIUM: ICD-10-CM

## 2024-10-22 PROCEDURE — 3044F HG A1C LEVEL LT 7.0%: CPT | Mod: CPTII,S$GLB,, | Performed by: OPTOMETRIST

## 2024-10-22 PROCEDURE — 1160F RVW MEDS BY RX/DR IN RCRD: CPT | Mod: CPTII,S$GLB,, | Performed by: OPTOMETRIST

## 2024-10-22 PROCEDURE — 99999 PR PBB SHADOW E&M-EST. PATIENT-LVL II: CPT | Mod: PBBFAC,,, | Performed by: OPTOMETRIST

## 2024-10-22 PROCEDURE — 1159F MED LIST DOCD IN RCRD: CPT | Mod: CPTII,S$GLB,, | Performed by: OPTOMETRIST

## 2024-10-22 PROCEDURE — 4010F ACE/ARB THERAPY RXD/TAKEN: CPT | Mod: CPTII,S$GLB,, | Performed by: OPTOMETRIST

## 2024-10-22 PROCEDURE — 92014 COMPRE OPH EXAM EST PT 1/>: CPT | Mod: S$GLB,,, | Performed by: OPTOMETRIST

## 2024-10-22 NOTE — PROGRESS NOTES
HPI    TRESSA: 09/23/24 outside Retina Specialist  Last DFE: 09/2024  Chief complaint (CC): 53 yo F here today for ocular concerns. She states   about 6 weeks ago she began to notice distorted wavy lines in her vision   in the left eye. Her parents have a history of wet AMD so she printed out   a amsler grid and noticed distortion along horizontal lines. She was seen   by a Retina Specialist on 09/23/24 and was told her Retina looks good in   bother eyes. Soon after this appointment the distortion moved to the right   eye also. The distortion is present with glasses, contact lenses and   without correction.  Glasses? Yes  Contacts? Yes  H/o eye surgery, injections or laser: Retinal Laser OS, Dr. Steward  H/o eye injury: No  Known eye conditions?  Lattice degeneraton, PVD  Family h/o eye conditions? Wet AMD  Eye gtts? Refresh tears prn, Allergy gtts prn OU       (-) Flashes (+)  Floaters (-) Mucous   (-)  Tearing (-) Itching (-) Burning   (-) Headaches (-) Eye Pain/discomfort (+) Irritation   (-)  Redness (-) Double vision (+) Blurry vision    CL Exam: No    Diabetic? -  A1c? Lab Results       Component                Value               Date                       HGBA1C                   5.4                 09/17/2024             Last edited by Damaso Lewis, OD on 10/22/2024  2:49 PM.            Assessment /Plan     For exam results, see Encounter Report.        Eye exam, routine   - Eyemed     Cataract cortical, senile, bilateral   - Not visually significant. Monitor.     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 Negative Shafers sign in vitreous. Patient educated on signs and symptoms of retinal detachment and advised to return to clinic immediately if symptoms arise.   - No evidence of AMD or any other macular disorder. Amsler grids given today, RTC should symptoms worsen or fail to improve.    - OCT macula  deferred today.     Congenital hypertrophy of retinal pigment epithelium   - Appears stable, monitor.

## 2024-10-23 DIAGNOSIS — I10 ESSENTIAL HYPERTENSION: ICD-10-CM

## 2024-10-23 DIAGNOSIS — E78.5 HYPERLIPIDEMIA, UNSPECIFIED HYPERLIPIDEMIA TYPE: ICD-10-CM

## 2024-10-23 RX ORDER — LISINOPRIL 10 MG/1
10 TABLET ORAL DAILY
Qty: 90 TABLET | Refills: 3 | Status: SHIPPED | OUTPATIENT
Start: 2024-10-23

## 2024-10-23 RX ORDER — ROSUVASTATIN CALCIUM 20 MG/1
20 TABLET, COATED ORAL DAILY
Qty: 90 TABLET | Refills: 3 | Status: SHIPPED | OUTPATIENT
Start: 2024-10-23

## 2024-10-23 NOTE — TELEPHONE ENCOUNTER
Refill Encounter    PCP Visits: Recent Visits  Date Type Provider Dept   09/17/24 Office Visit Kristen Abreu MD [facility]   12/05/23 Office Visit Kristen Abreu MD [facility]   Showing recent visits within past 360 days and meeting all other requirements  Future Appointments  No visits were found meeting these conditions.  Showing future appointments within next 720 days and meeting all other requirements     Last 3 Blood Pressure:   BP Readings from Last 3 Encounters:   09/17/24 114/70   12/05/23 120/74   05/05/23 (!) 140/74     Preferred Pharmacy:   [facility]/pharmacy #[id number] - [place] - [address]  [address] [facility] [place] [address]  Phone: [phone] Fax: [phone]    Requested RX:  Requested Prescriptions     Pending Prescriptions Disp Refills    lisinopriL 10 MG tablet 90 tablet 3     Sig: Take 1 tablet (10 mg total) by mouth once daily.    rosuvastatin (CRESTOR) 20 MG tablet 90 tablet 0     Sig: Take 1 tablet (20 mg total) by mouth once daily.      RX Route: Normal

## 2024-10-23 NOTE — TELEPHONE ENCOUNTER
Care Due:                  Date            Visit Type   Department     Provider  --------------------------------------------------------------------------------                                MYCHART                              ANNUAL                              CHECKUP/PHY  [facility] INTERNAL  Last Visit: 09-      [facility]       Kristen Abreu  Next Visit: None Scheduled  None         None Found                                                            Last  Test          Frequency    Reason                     Performed    Due Date  --------------------------------------------------------------------------------    Uric Acid...  12 months..  allopurinoL..............  Not Found    Overdue    [employer] Embedded Care Due Messages. Reference number: [id number].   10/23/2024 1:08:00 PM CDT

## 2024-11-13 ENCOUNTER — PATIENT MESSAGE (OUTPATIENT)
Dept: INTERNAL MEDICINE | Facility: CLINIC | Age: 54
End: 2024-11-13
Payer: COMMERCIAL

## 2024-11-13 DIAGNOSIS — M54.30 SCIATICA, UNSPECIFIED LATERALITY: Primary | ICD-10-CM

## 2024-11-13 DIAGNOSIS — E78.5 HYPERLIPIDEMIA, UNSPECIFIED HYPERLIPIDEMIA TYPE: ICD-10-CM

## 2024-11-14 RX ORDER — ROSUVASTATIN CALCIUM 40 MG/1
TABLET, COATED ORAL
Qty: 45 TABLET | Refills: 3 | Status: SHIPPED | OUTPATIENT
Start: 2024-11-14

## 2024-11-14 RX ORDER — GABAPENTIN 600 MG/1
600 TABLET ORAL NIGHTLY
Qty: 90 TABLET | Refills: 3 | Status: SHIPPED | OUTPATIENT
Start: 2024-11-14

## 2024-11-14 NOTE — TELEPHONE ENCOUNTER
No care due was identified.  [facility] Embedded Care Due Messages. Reference number: [id number].   11/14/2024 8:13:29 AM CST

## 2024-12-12 ENCOUNTER — HOSPITAL ENCOUNTER (OUTPATIENT)
Dept: RADIOLOGY | Facility: HOSPITAL | Age: 54
Discharge: HOME | End: 2024-12-12
Attending: INTERNAL MEDICINE
Payer: COMMERCIAL

## 2024-12-12 DIAGNOSIS — Z12.31 ENCOUNTER FOR SCREENING MAMMOGRAM FOR MALIGNANT NEOPLASM OF BREAST: ICD-10-CM

## 2024-12-12 PROCEDURE — 77067 SCR MAMMO BI INCL CAD: CPT | Mod: TC

## 2025-01-14 DIAGNOSIS — M10.9 GOUT, UNSPECIFIED CAUSE, UNSPECIFIED CHRONICITY, UNSPECIFIED SITE: ICD-10-CM

## 2025-01-14 RX ORDER — ALLOPURINOL 300 MG/1
300 TABLET ORAL DAILY
Qty: 90 TABLET | Refills: 3 | Status: SHIPPED | OUTPATIENT
Start: 2025-01-14 | End: 2025-01-22 | Stop reason: SDUPTHER

## 2025-01-14 NOTE — TELEPHONE ENCOUNTER
Refill Routing Note   Medication(s) are not appropriate for processing by [facility] for the following reason(s):        Required labs outdated    [facility] action(s):  Defer     Requires labs : Yes           Appointments  past 12m or future 3m with PCP    Date Provider   Last Visit   9/17/2024 Kristen Abreu MD   Next Visit   Visit date not found Kristen Abreu MD   ED visits in past 90 days: 0        Note composed:3:27 PM 01/14/2025

## 2025-01-14 NOTE — TELEPHONE ENCOUNTER
Care Due:                  Date            Visit Type   Department     Provider  --------------------------------------------------------------------------------                                MYCHART                              ANNUAL                              CHECKUP/PHY  [facility] INTERNAL  Last Visit: 09-      [facility]       Kristen Abreu  Next Visit: None Scheduled  None         None Found                                                            Last  Test          Frequency    Reason                     Performed    Due Date  --------------------------------------------------------------------------------    Uric Acid...  12 months..  allopurinoL..............  Not Found    Overdue    [employer] Embedded Care Due Messages. Reference number: [id number].   1/14/2025 2:57:20 PM CST

## 2025-01-22 ENCOUNTER — PATIENT MESSAGE (OUTPATIENT)
Dept: INTERNAL MEDICINE | Facility: CLINIC | Age: 55
End: 2025-01-22
Payer: COMMERCIAL

## 2025-01-22 ENCOUNTER — PATIENT MESSAGE (OUTPATIENT)
Dept: INTERNAL MEDICINE | Facility: CLINIC | Age: 55
End: 2025-01-22

## 2025-01-22 ENCOUNTER — OFFICE VISIT (OUTPATIENT)
Dept: INTERNAL MEDICINE | Facility: CLINIC | Age: 55
End: 2025-01-22
Attending: INTERNAL MEDICINE
Payer: COMMERCIAL

## 2025-01-22 DIAGNOSIS — M10.00 IDIOPATHIC GOUT, UNSPECIFIED CHRONICITY, UNSPECIFIED SITE: Primary | ICD-10-CM

## 2025-01-22 DIAGNOSIS — M10.9 GOUT, UNSPECIFIED CAUSE, UNSPECIFIED CHRONICITY, UNSPECIFIED SITE: ICD-10-CM

## 2025-01-22 PROCEDURE — 99499 UNLISTED E&M SERVICE: CPT | Mod: 95,,, | Performed by: INTERNAL MEDICINE

## 2025-01-22 RX ORDER — ALLOPURINOL 300 MG/1
300 TABLET ORAL DAILY
Qty: 90 TABLET | Refills: 3 | Status: SHIPPED | OUTPATIENT
Start: 2025-01-22

## 2025-01-22 NOTE — TELEPHONE ENCOUNTER
No care due was identified.  [facility] Embedded Care Due Messages. Reference number: [id number].   1/22/2025 1:26:35 PM CST

## 2025-01-22 NOTE — PROGRESS NOTES
Subjective:   Patient ID: Lashae Colvin is a 54 y.o. female  Chief complaint: No chief complaint on file.      HPI    Review of Systems    Objective:  There were no vitals filed for this visit.  There is no height or weight on file to calculate BMI.    Physical ExamThe [facility]  The chief complaint leading to consultation is: med refill     Visit type: audiovisual    Face to Face time with patient: 2  3 minutes of total time spent on the encounter, which includes face to face time and non-face to face time preparing to see the patient (eg, review of tests), Obtaining and/or reviewing separately obtained history, Documenting clinical information in the electronic or other health record, Independently interpreting results (not separately reported) and communicating results to the patient/family/caregiver, or Care coordination (not separately reported).         Each patient to whom he or she provides medical services by telemedicine is:  (1) informed of the relationship between the physician and patient and the respective role of any other health care provider with respect to management of the patient; and (2) notified that he or she may decline to receive medical services by telemedicine and may withdraw from such care at any time.    Notes:   Only needed refill for allopurinol which was just sent in right before this appt after reviewing pt portal message - jenn med well and no current sx of flare   - does not need colchicine at this tme - if changes mind understands to let me know    Assessment:  1. Idiopathic gout, unspecified chronicity, unspecified site        Plan:  1. Idiopathic gout, unspecified chronicity, unspecified site      Refill prev sent in earlier today   Will not bill for this encounter as already handled and no further concerns     Health Maintenance   Topic Date Due    Colorectal Cancer Screening  09/16/2025    Hemoglobin A1c (Prediabetes)  09/17/2025    Mammogram   12/12/2025    DEXA Scan  04/04/2026    Lipid Panel  09/17/2029    TETANUS VACCINE  11/13/2029    Cervical Cancer Screening  12/18/2029    RSV Vaccine (Age 60+ and Pregnant patients) (1 - 1-dose 75+ series) 03/04/2047    Hepatitis C Screening  Completed    Shingles Vaccine  Completed    Influenza Vaccine  Completed    HIV Screening  Completed    COVID-19 Vaccine  Completed    Pneumococcal Vaccines (Age 50+)  Completed

## 2025-01-22 NOTE — TELEPHONE ENCOUNTER
Refill Encounter    PCP Visits: Recent Visits  Date Type Provider Dept   09/17/24 Office Visit Kristen Abreu MD [facility] Internal Medicine   Showing recent visits within past 360 days and meeting all other requirements  Today's Visits  Date Type Provider Dept   01/22/25 Appointment Kristen Abreu MD [facility] Internal Medicine   Showing today's visits and meeting all other requirements  Future Appointments  No visits were found meeting these conditions.  Showing future appointments within next 720 days and meeting all other requirements     Last 3 Blood Pressure:   BP Readings from Last 3 Encounters:   09/17/24 114/70   12/05/23 120/74   05/05/23 (!) 140/74     Preferred Pharmacy:   [facility] [address] [place], [place] - [address]  [address]  [facility] [address]  Phone: [phone] Fax: [phone]    [facility][id number] - [place], [place] - [address] [address]  Phone: [phone] Fax: [phone]    [facility]/pharmacy #[id number] - [place], [place] - [address]  [address]  [place] [address]  Phone: [phone] Fax: [phone]    [facility] [address] [place], [place] - [facility] [address]  Phone: [phone] Fax: [phone]    Requested RX:  Requested Prescriptions     Pending Prescriptions Disp Refills    allopurinoL (ZYLOPRIM) 300 MG tablet 90 tablet 3     Sig: Take 1 tablet (300 mg total) by mouth once daily.      RX Route: Normal

## 2025-01-28 ENCOUNTER — PATIENT OUTREACH (OUTPATIENT)
Dept: ADMINISTRATIVE | Facility: HOSPITAL | Age: 55
End: 2025-01-28
Payer: COMMERCIAL

## 2025-04-08 ENCOUNTER — PATIENT MESSAGE (OUTPATIENT)
Dept: INTERNAL MEDICINE | Facility: CLINIC | Age: 55
End: 2025-04-08
Payer: COMMERCIAL

## 2025-04-08 DIAGNOSIS — H53.9 VISUAL DISTURBANCE: Primary | ICD-10-CM

## 2025-04-08 DIAGNOSIS — H57.9 EYE EXAM ABNORMAL: ICD-10-CM

## 2025-04-10 ENCOUNTER — TELEPHONE (OUTPATIENT)
Dept: OPHTHALMOLOGY | Facility: CLINIC | Age: 55
End: 2025-04-10
Payer: COMMERCIAL

## 2025-04-24 ENCOUNTER — PATIENT MESSAGE (OUTPATIENT)
Dept: INTERNAL MEDICINE | Facility: CLINIC | Age: 55
End: 2025-04-24
Payer: COMMERCIAL

## 2025-04-24 DIAGNOSIS — M54.50 CHRONIC BILATERAL LOW BACK PAIN, UNSPECIFIED WHETHER SCIATICA PRESENT: ICD-10-CM

## 2025-04-24 DIAGNOSIS — G89.29 CHRONIC BILATERAL LOW BACK PAIN, UNSPECIFIED WHETHER SCIATICA PRESENT: ICD-10-CM

## 2025-04-24 RX ORDER — CYCLOBENZAPRINE HCL 10 MG
10 TABLET ORAL
Qty: 30 TABLET | Refills: 1 | OUTPATIENT
Start: 2025-04-24

## 2025-04-24 NOTE — TELEPHONE ENCOUNTER
Staff started refill encounter but did not know patients preferred pharmacy for this refill. Staff contacted patient in regards to refill but she was unavailable. Staff left voice message.

## 2025-04-25 DIAGNOSIS — G89.29 CHRONIC BILATERAL LOW BACK PAIN, UNSPECIFIED WHETHER SCIATICA PRESENT: ICD-10-CM

## 2025-04-25 DIAGNOSIS — M54.50 CHRONIC BILATERAL LOW BACK PAIN, UNSPECIFIED WHETHER SCIATICA PRESENT: ICD-10-CM

## 2025-04-25 RX ORDER — CYCLOBENZAPRINE HCL 10 MG
10 TABLET ORAL 3 TIMES DAILY PRN
Qty: 10 TABLET | Refills: 0 | Status: SHIPPED | OUTPATIENT
Start: 2025-04-25

## 2025-04-25 NOTE — TELEPHONE ENCOUNTER
Refill Encounter    PCP Visits: Recent Visits  Date Type Provider Dept   01/22/25 Office Visit Kristen Abreu MD [facility] Internal Medicine   09/17/24 Office Visit Kristen Abreu MD [facility] Internal Medicine   Showing recent visits within past 360 days and meeting all other requirements  Future Appointments  No visits were found meeting these conditions.  Showing future appointments within next 720 days and meeting all other requirements      Last 3 Blood Pressure:   BP Readings from Last 3 Encounters:   09/17/24 114/70   12/05/23 120/74   05/05/23 (!) 140/74     Preferred Pharmacy:   [facility]/pharmacy #[id number] - [place] - [address]  [address]  [place] [address]  Phone: [phone] Fax: [phone]    Requested RX:  Requested Prescriptions     Pending Prescriptions Disp Refills    cyclobenzaprine (FLEXERIL) 10 MG tablet       Sig: Take 1 tablet (10 mg total) by mouth. prn      RX Route: Normal

## 2025-04-25 NOTE — TELEPHONE ENCOUNTER
Staff contacted patient, Staff offered appointment, patient was scheduled for follow up with  in July.

## 2025-04-25 NOTE — TELEPHONE ENCOUNTER
Care Due:                  Date            Visit Type   Department     Provider  --------------------------------------------------------------------------------                                MYCHART                              SAME DAY                              VIRTUAL      [facility] INTERNAL  Last Visit: 01-      VISIT        MEDICINE       Kristen Abreu  Next Visit: None Scheduled  None         None Found                                                            Last  Test          Frequency    Reason                     Performed    Due Date  --------------------------------------------------------------------------------    Uric Acid...  12 months..  allopurinoL..............  Not Found    Overdue    [employer] Embedded Care Due Messages. Reference number: [id number].   4/25/2025 7:23:07 AM CDT

## 2025-04-25 NOTE — TELEPHONE ENCOUNTER
----- Message from Cheng sent at 4/24/2025  4:05 PM CDT -----  Type:  Patient Returning CallWho Called: Who Left Message for Patient: Does the patient know what this is regarding?: missed call Best Call Back Number:   [phone] Information:

## 2025-04-25 NOTE — TELEPHONE ENCOUNTER
Please let pt know that I am covering for Dr. Abreu today/    I sent in a refills of 10 tabs of flexerill, but she may need an appt to be evaluated now for whatever is going on.  Obviously I have not assessed it.      Certainly if symptoms worsen or fail to improve, I recommend a visit to evaluate    Thanks  jl

## 2025-07-24 ENCOUNTER — OFFICE VISIT (OUTPATIENT)
Dept: INTERNAL MEDICINE | Facility: CLINIC | Age: 55
End: 2025-07-24
Attending: INTERNAL MEDICINE
Payer: COMMERCIAL

## 2025-07-24 VITALS
HEIGHT: 66 IN | BODY MASS INDEX: 28.16 KG/M2 | SYSTOLIC BLOOD PRESSURE: 130 MMHG | WEIGHT: 175.25 LBS | OXYGEN SATURATION: 98 % | HEART RATE: 75 BPM | DIASTOLIC BLOOD PRESSURE: 85 MMHG

## 2025-07-24 DIAGNOSIS — Z00.00 ANNUAL PHYSICAL EXAM: ICD-10-CM

## 2025-07-24 DIAGNOSIS — R35.1 NOCTURIA: ICD-10-CM

## 2025-07-24 DIAGNOSIS — E55.9 VITAMIN D DEFICIENCY: ICD-10-CM

## 2025-07-24 DIAGNOSIS — D50.9 IRON DEFICIENCY ANEMIA, UNSPECIFIED IRON DEFICIENCY ANEMIA TYPE: ICD-10-CM

## 2025-07-24 DIAGNOSIS — N95.9 MENOPAUSAL PROBLEM: ICD-10-CM

## 2025-07-24 DIAGNOSIS — R30.0 DYSURIA: ICD-10-CM

## 2025-07-24 DIAGNOSIS — N95.1 MENOPAUSAL SYMPTOM: Primary | ICD-10-CM

## 2025-07-24 DIAGNOSIS — M10.9 GOUT, UNSPECIFIED CAUSE, UNSPECIFIED CHRONICITY, UNSPECIFIED SITE: ICD-10-CM

## 2025-07-24 DIAGNOSIS — E78.5 HYPERLIPIDEMIA, UNSPECIFIED HYPERLIPIDEMIA TYPE: ICD-10-CM

## 2025-07-24 DIAGNOSIS — R73.01 IFG (IMPAIRED FASTING GLUCOSE): ICD-10-CM

## 2025-07-24 PROCEDURE — 99999 PR PBB SHADOW E&M-EST. PATIENT-LVL IV: CPT | Mod: PBBFAC,,, | Performed by: INTERNAL MEDICINE

## 2025-07-24 RX ORDER — ROSUVASTATIN CALCIUM 20 MG/1
20 TABLET, COATED ORAL DAILY
Qty: 90 TABLET | Refills: 3 | Status: SHIPPED | OUTPATIENT
Start: 2025-07-24

## 2025-07-24 RX ORDER — ALLOPURINOL 300 MG/1
300 TABLET ORAL DAILY
Qty: 90 TABLET | Refills: 3 | Status: SHIPPED | OUTPATIENT
Start: 2025-07-24

## 2025-07-24 NOTE — PROGRESS NOTES
"Subjective:   Patient ID: Lashae Colvin is a 55 y.o. female  Chief complaint:   Chief Complaint   Patient presents with    Hypertension       HPI  Here for HTN, hot flashes:     Hot flashes, menopause and uncomfortable and affectinig sleep  Taking gpn 600mg at night with sciatica   - has 100mg to take during the day if needed   - discussed med options and leery of ssri/snri options   - to est with new gyn to revisit other tx options     Nocturia:  Consuming caffeine - Tea - can switch to decaf   Wine - weekends or social during week   No stress incontinence or leakage but feels weak pelvic floor concerns   2 utis in past year   Inc frequency - can wax and wane, sensitive skin at times   Dry skin     Htn:   Not checking at home often   126/77  C/w acei    Review of Systems    Objective:  Vitals:    07/24/25 1452   BP: 130/85   Pulse: 75   SpO2: 98%   Weight: 79.5 kg (175 lb 4.3 oz)   Height: 5' 6" (1.676 m)     Body mass index is 28.29 kg/m².    Physical Exam  Vitals reviewed.   Constitutional:       Appearance: Normal appearance. She is well-developed.   HENT:      Head: Normocephalic and atraumatic.   Eyes:      Extraocular Movements: Extraocular movements intact.      Conjunctiva/sclera: Conjunctivae normal.   Cardiovascular:      Rate and Rhythm: Normal rate and regular rhythm.      Pulses: Normal pulses.      Heart sounds: Normal heart sounds.   Pulmonary:      Effort: Pulmonary effort is normal.      Breath sounds: Normal breath sounds.   Abdominal:      General: Bowel sounds are normal.      Palpations: Abdomen is soft.      Tenderness: There is no abdominal tenderness. There is no right CVA tenderness or left CVA tenderness.   Musculoskeletal:         General: No swelling or tenderness.      Cervical back: Normal range of motion and neck supple.   Skin:     General: Skin is warm and dry.      Nails: There is no clubbing.   Neurological:      Mental Status: She is alert and oriented to person, place, " and time.      Gait: Gait normal.   Psychiatric:         Speech: Speech normal.         Behavior: Behavior normal.         Assessment:  1. Menopausal symptom    2. Dysuria    3. Hyperlipidemia, unspecified hyperlipidemia type    4. Gout, unspecified cause, unspecified chronicity, unspecified site    5. Menopausal problem    6. Annual physical exam    7. Vitamin D deficiency    8. IFG (impaired fasting glucose)    9. Iron deficiency anemia, unspecified iron deficiency anemia type        Plan:  1. Menopausal symptom  -     Ambulatory referral/consult to Obstetrics / Gynecology; Future; Expected date: 07/31/2025    2. Dysuria  -     Urinalysis; Future; Expected date: 07/24/2025  -     Urine Culture High Risk; Future; Expected date: 07/24/2025  -     Urinalysis  -     Urine Culture High Risk    3. Hyperlipidemia, unspecified hyperlipidemia type  -     rosuvastatin (CRESTOR) 20 MG tablet; Take 1 tablet (20 mg total) by mouth once daily.  Dispense: 90 tablet; Refill: 3    4. Gout, unspecified cause, unspecified chronicity, unspecified site  -     allopurinoL (ZYLOPRIM) 300 MG tablet; Take 1 tablet (300 mg total) by mouth once daily.  Dispense: 90 tablet; Refill: 3  -     Uric Acid; Future; Expected date: 09/24/2025    5. Menopausal problem    6. Annual physical exam  -     Vitamin D; Future; Expected date: 09/24/2025  -     Hemoglobin A1C; Future; Expected date: 09/24/2025  -     TSH; Future; Expected date: 09/24/2025  -     Lipid Panel; Future; Expected date: 09/24/2025  -     CBC Auto Differential; Future; Expected date: 09/24/2025  -     Comprehensive Metabolic Panel; Future; Expected date: 09/24/2025    7. Vitamin D deficiency  -     Vitamin D; Future; Expected date: 09/24/2025    8. IFG (impaired fasting glucose)  -     Hemoglobin A1C; Future; Expected date: 09/24/2025    9. Iron deficiency anemia, unspecified iron deficiency anemia type  -     CBC Auto Differential        Refer gyn - discuss pot veozah for hot  flashes   Consider pelvic floor PT after gyn exam if felt to be pot helfpul/indicated   Discussed pot transvag us to eval further and estrogen vs oral or patch - will discuss with gyn  Consider mybetriq or gemtessa if no uti and if intestered after further research   Limit caffeine     Htn:   Cont acie - controlled     Gout: stable   Cont med     Health Maintenance   Topic Date Due    Colorectal Cancer Screening  09/16/2025    Influenza Vaccine (1) 09/01/2025    Hemoglobin A1c (Prediabetes)  09/17/2025    Mammogram  12/12/2025    DEXA Scan  04/04/2026    Lipid Panel  09/17/2029    TETANUS VACCINE  11/13/2029    Cervical Cancer Screening  12/18/2029    RSV Vaccine (Age 60+ and Pregnant patients) (1 - 1-dose 75+ series) 03/04/2047    Hepatitis C Screening  Completed    Shingles Vaccine  Completed    HIV Screening  Completed    COVID-19 Vaccine  Completed    Pneumococcal Vaccines (Age 50+)  Completed     Visit today included increased complexity associated with the care of the episodic problem nocturia, menopausal symptom addressed and managing the longitudinal care of the patient due to the serious and/or complex managed problem(s) gout, htn.

## 2025-07-25 LAB
BILIRUB UR QL STRIP.AUTO: NEGATIVE
CLARITY UR: CLEAR
COLOR UR AUTO: YELLOW
GLUCOSE UR QL STRIP: NEGATIVE
HGB UR QL STRIP: NEGATIVE
KETONES UR QL STRIP: ABNORMAL
LEUKOCYTE ESTERASE UR QL STRIP: NEGATIVE
NITRITE UR QL STRIP: NEGATIVE
PH UR STRIP: 5 [PH]
PROT UR QL STRIP: NEGATIVE
SP GR UR STRIP: 1.01
UROBILINOGEN UR STRIP-ACNC: NEGATIVE EU/DL

## 2025-08-20 ENCOUNTER — TELEPHONE (OUTPATIENT)
Dept: OPHTHALMOLOGY | Facility: CLINIC | Age: 55
End: 2025-08-20
Payer: COMMERCIAL